# Patient Record
Sex: FEMALE | Race: WHITE | Employment: OTHER | URBAN - METROPOLITAN AREA
[De-identification: names, ages, dates, MRNs, and addresses within clinical notes are randomized per-mention and may not be internally consistent; named-entity substitution may affect disease eponyms.]

---

## 2017-12-27 PROBLEM — E11.9 TYPE 2 DIABETES MELLITUS WITHOUT COMPLICATION, WITHOUT LONG-TERM CURRENT USE OF INSULIN (HCC): Status: ACTIVE | Noted: 2017-12-27

## 2017-12-27 PROBLEM — E66.01 OBESITY, MORBID (HCC): Status: ACTIVE | Noted: 2017-12-27

## 2017-12-27 PROBLEM — G43.909 MIGRAINE WITHOUT STATUS MIGRAINOSUS, NOT INTRACTABLE: Status: ACTIVE | Noted: 2017-12-27

## 2017-12-27 PROBLEM — I10 ESSENTIAL HYPERTENSION: Status: ACTIVE | Noted: 2017-12-27

## 2017-12-27 PROBLEM — F41.9 ANXIETY: Status: ACTIVE | Noted: 2017-12-27

## 2017-12-27 PROBLEM — G25.81 RESTLESS LEGS SYNDROME: Status: ACTIVE | Noted: 2017-12-27

## 2017-12-27 PROBLEM — E03.9 ACQUIRED HYPOTHYROIDISM: Status: ACTIVE | Noted: 2017-12-27

## 2018-09-24 PROBLEM — E11.40 TYPE 2 DIABETES MELLITUS WITH DIABETIC NEUROPATHY (HCC): Status: ACTIVE | Noted: 2018-09-24

## 2018-10-11 PROBLEM — Z78.9 POOR TOLERANCE FOR AMBULATION: Status: ACTIVE | Noted: 2018-10-11

## 2018-12-12 PROBLEM — E66.01 MORBID OBESITY WITH BMI OF 70 AND OVER, ADULT (HCC): Status: ACTIVE | Noted: 2018-12-12

## 2019-09-24 ENCOUNTER — HOSPITAL ENCOUNTER (OUTPATIENT)
Dept: PHYSICAL THERAPY | Age: 59
Discharge: HOME | End: 2019-09-24
Attending: FAMILY MEDICINE
Payer: MEDICARE

## 2019-09-24 DIAGNOSIS — R29.898 WEAKNESS OF BOTH LEGS: ICD-10-CM

## 2019-09-24 PROCEDURE — 97165 OT EVAL LOW COMPLEX 30 MIN: CPT

## 2019-09-24 PROCEDURE — 97542 WHEELCHAIR MNGMENT TRAINING: CPT

## 2019-09-24 NOTE — THERAPY EVALUATION
Marcella Coy  : 1960  [place]  [place] [address] [facility] at [facility] [address], [address], [place], [address] [facility]  [phone]   [phone]           OCCUPATIONAL THERAPY WHEELCHAIR SEATING AND POSITIONING NOTE: Initial Assessment, Discharge and Treatment Day: 2019   ICD-10: Treatment Diagnosis:   Difficulty in walking, not elsewhere classified (R26.2)  History of falling (Z91.81)  Low back pain (M54.5)  Precautions/Allergies:   Codeine   MD Orders: OT evaluate and treat; Comments: Power wheelchair, patient is disabled, unable to ambulate easily, and is morbidly obese. MEDICAL/REFERRING DIAGNOSIS:   Weakness of both legs [R29.898]   DATE OF ONSET: Insidious over the last few years. REFERRING PHYSICIAN: Ryan Xie MD  RETURN PHYSICIAN APPOINTMENT: Pending  INTERDISCIPLINARY COLLABORATION: OT and ATP (with [employer]). INITIAL ASSESSMENT:  Ms. RIVAS presents with a history of morbid obesity, arthritis, asthma, diabetes, and chronic low back pain/hips. She reports increasing difficulty walking/repeated falls (about once a month) especially over the last 2 years and states she can no longer walk out to her car or stand for more than 2 minutes without severe pain in her back/legs. She has to scoot around her house in a computer chair to complete homemaking tasks. She would like a power wheelchair to be able to get around her home to complete her own cooking, cleaning, and other mobility related activities of daily living. Today she completed the timed up and go in 18 seconds utilizing a straight cane with contact guard assistance (The test measures, in seconds, the time taken by an individual to stand up from a standard arm chair (seat height 46 cm [18 in], arm height 65 cm [25.6 in]), walk a distance of 3 meters (118 in, approx 10 ft), turn, walk back to the chair and sit down.   If the individual takes longer than 14 seconds to complete TUG, this indicates risk for falls. ). Ms. Eulalio Cesar also completed the six minute walk test with ability to walk 120 feet in 75 seconds prior to requiring a sitting rest break (Normal range varies but is approximately 6160-1634 Feet) due to back/leg pain, decreased activity tolerance, and shortness of breath upon exertion with an oxygen saturation of 88% on room air following. No devices including canes, walkers, or rollators would improve her safety or ability to complete mobility related activities of daily living. Also, no manual wheelchair (i.e. Standard, lightweight, high strength lightweight, or ultra lightweight) would improve her independence with mobility related activities of daily living. Lastly, a power scooter would not improve her independence with mobility related activities of daily living due to difficulty transferring onto the platform and operating it in her home environment effectively. A heavy duty power wheelchair is recommended  for her to be able to effectively complete mobility related activities of daily living such as cooking and cleaning. PLAN OF CARE:   PROBLEM LIST:  1. Decreased Strength  2. Decreased ADL/Functional Activities  3. Decreased Balance  4. Increased Pain  5. Decreased Activity Tolerance  6. Decreased Pacing Skills  7. Increased Shortness of Breath  8. Decreased Flexibility/Joint Mobility  9. Edema/Girth  10. Difficulty Walking and Frequent falls INTERVENTIONS PLANNED:  1. Wheelchair management   TREATMENT PLAN:  Effective Dates: 9/24/2019 TO 9/24/2019. Frequency/Duration: Today's treatment/assessment. DISCHARGE GOALS: (Goals have been discussed and agreed upon with patient.): Time Frame: 1 visit  1.  Vamsi Song will trial a power wheelchair demonstrating good visual attention, visual-perception, command following, problem solving, reaction time, and activity tolerance as needed to operate a power wheelchair in an indoor and outdoor setting. Met. Rehabilitation Potential For Stated Goals: Goal met. EQUIPMENT RECOMMENDATIONS:   1. Heavy duty group 2 power wheelchair secondary to difficulty walking with any device significant and difficulty propelling any manual wheelchair to complete mobility related activities of daily living. She would not be able to utilize a power scooter difficulty transferring to chair/platform and need for increased support due to being overweight (365 lbs). She would benefit from a heavy duty group 2 power wheelchair to enable greater independence with mobility related activities of daily living. 2.  Retractable R joystick with hardware to enable safer functional transfers and to enable access to home environment such as the kitchen table. 3. Height adjustable swing away arm rests to enable safer functional transfers. 4. General use cushion. 5. General use back. 6. Batteries to power wheelchair  Regarding Ms. Carlton's therapy, I certify that the treatment plan above will be carried out by a therapist or under their direction. Thank you for this referral,  Krissy Craig OTR/L     Referring Physician Signature: Donny Rivera MD_________________________  Date _________            The information in this section was collected on 9/24/19 (except where otherwise noted). OCCUPATIONAL PROFILE & HISTORY:   History of Present Injury/Illness (Reason for Referral): Lord Ngo is a pleasant obese lady with a history of fibromyalgia, osteoarthritis, asthma (wear's a CPAP), sciatica and diabetes. Patient states she can't walk and hasn't been able to walk for a couple of years. States walking on unlevel ground is very difficult. States she bought a \"hurry cane\" and sates she fell with it. Typically falls about once a month. She states if she gets her foot hung on something she \"goes down\" and it is very difficult to stand up from the ground with her history of knee replacements. States she broke her great toe from a fall in 2009. Has to use a computer chair to mop the floor/do housework. States she has arthritis in her back and has significant pain. She states her R shoulder has significant pain in it. Had a nerve conduction test and was told it was coming from her neck. Patient states she uses a rolling walker because she gets better support from it. Typically uses it outside. States her friend loaned her a manual w/c that she has difficulty pushing. Goes over to her nephews about once a month. Patient states her doctor is trying to get a power w/c so she can get out and do things such as housework in the home (kitchen, den and living room), going grocery shopping, and get around outside in the yard. Past Medical History/Comorbidities:   Ms. Eulalio Cesar  has a past medical history of Acquired hypothyroidism (12/27/2017), Anxiety, Arthritis, Depression, Fibromyalgia, Hypertension (1977), Obesity, morbid ([address] Psychiatric disorder (2004), Psychiatric disorder (2004), Sleep apnea, Type 2 diabetes mellitus without complication, without long-term current use of insulin ([address] (12/27/2017), and Unspecified adverse effect of anesthesia. She also has no past medical history of Arrhythmia, Asthma, Autoimmune disease ([address] CAD (coronary artery disease), Cancer ([address] Chronic kidney disease, Chronic pain, Coagulation defects, COPD, Difficult intubation, GERD (gastroesophageal reflux disease), Heart failure ([address] Liver disease, Malignant hyperthermia due to anesthesia, Nausea & vomiting, Other ill-defined conditions(799.89), Pseudocholinesterase deficiency, PUD (peptic ulcer disease), Seizures ([address] or Thromboembolus ([address] Ms. Eulalio Cesar  has a past surgical history that includes pr uns oral surg proc by report (2000); hx appendectomy (1973); hx cholecystectomy (1990); hx gastric bypass (2002); hx orthopaedic (1975); hx orthopaedic (July 2009); hx dilation and curettage; and hx gyn.   Social History/Living Environment:     Lives with sister-in-law in a 2 story home (11 steps between the first and second floor - washer/dryer on the bottom floor). Enters into the house from second floor with 2 steps to enter. Drives a car ([employer]). Has hardwood gely except in her bedroom. DME: Rollator, rolling walker, and cane. 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 Became disabled after having knee replacement surgery. Caregiver: Sister-in-law and sister helps with washing clothes (difficulty getting to downstairs). Activity Level: States she is out of the bed about 5 hours a day sitting in the kitchen watching TV or doing housework. Typically sits in the bed. Has sleep problems. Dominant Side:         RIGHT  Previous Treatment Approaches:          Outpatient PT at [place].   Current Medications:    Current Outpatient Medications:     diclofenac (VOLTAREN) 1 % gel, Apply  to affected area four (4) times daily. , Disp: , Rfl:     clonazePAM (KLONOPIN) 1 mg tablet, TAKE 1 TABLET BY MOUTH TWICE A DAY, Disp: 60 Tab, Rfl: 2    budesonide-formoterol (SYMBICORT) 160-4.5 mcg/actuation HFAA, Take 2 Puffs by inhalation two (2) times a day. Recommended med, Disp: 1 Inhaler, Rfl: 5    omeprazole (PRILOSEC) 40 mg capsule, Take 1 Cap by mouth daily. , Disp: 90 Cap, Rfl: 1    rOPINIRole (REQUIP) 2 mg tablet, Take 1 Tab by mouth nightly., Disp: 90 Tab, Rfl: 1    MYRBETRIQ 50 mg ER tablet, Take 1 Tab by mouth daily. , Disp: 30 Tab, Rfl: 11    DULoxetine (CYMBALTA) 30 mg capsule, Take 1 Cap by mouth daily. Pt is to add 30mg dose to the 60mg dose =90 mg dose total (two scripts), Disp: 90 Cap, Rfl: 3    pioglitazone-metFORMIN (ACTOPLUS MET)  mg per tablet, Take 1 Tab by mouth two (2) times daily (with meals). , Disp: 180 Tab, Rfl: 1    DULoxetine (CYMBALTA) 60 mg capsule, Take 1 Cap by mouth daily. , Disp: 90 Cap, Rfl: 3   levothyroxine (SYNTHROID) 75 mcg tablet, Take 1 Tab by mouth Daily (before breakfast). Indications: hypothyroidism, Disp: 90 Tab, Rfl: 1    amLODIPine (NORVASC) 10 mg tablet, Take 1 Tab by mouth daily. , Disp: 90 Tab, Rfl: 1    hydroCHLOROthiazide (HYDRODIURIL) 25 mg tablet, Take 1 Tab by mouth daily. , Disp: 90 Tab, Rfl: 1    cloNIDine HCl (CATAPRES) 0.2 mg tablet, Take when systolic BP is > than 824. UP to 3 times in 24 hours, Disp: 30 Tab, Rfl: 3    tiZANidine (ZANAFLEX) 2 mg tablet, Take 1 Tab by mouth two (2) times a day., Disp: 180 Tab, Rfl: 1    meclizine (ANTIVERT) 25 mg tablet, Take 1 Tab by mouth three (3) times daily as needed (for dizziness). , Disp: 90 Tab, Rfl: 3    levothyroxine (SYNTHROID) 50 mcg tablet, Take 1 Tab by mouth Daily (before breakfast). , Disp: 90 Tab, Rfl: 1    miscellaneous medical supply misc, BP cuff, use for checking BP, pt has hypertension and should check at least once daily. , Disp: 1 Each, Rfl: 0    miscellaneous medical supply misc, Power Scooter (one) Poor ambulation. , Disp: 1 Each, Rfl: 0    sucralfate (CARAFATE) 100 mg/mL suspension, Take 10 mL by mouth four (4) times daily. , Disp: 1200 mL, Rfl: 2    miscellaneous medical supply misc, Handicap Placard - Use as directed for poor ambulation. , Disp: 1 Each, Rfl: 0    morphine CR (MS CONTIN) 15 mg CR tablet, , Disp: , Rfl:     oxyCODONE-acetaminophen (PERCOCET 10)  mg per tablet, , Disp: , Rfl:     fluticasone (FLONASE) 50 mcg/actuation nasal spray, 2 Sprays by Both Nostrils route daily. , Disp: 3 Bottle, Rfl: 3    raNITIdine (ZANTAC) 150 mg tablet, Take 1 Tab by mouth two (2) times a day., Disp: 180 Tab, Rfl: 5    budesonide-formoterol (SYMBICORT) 160-4.5 mcg/actuation HFAA, Take 2 Puffs by inhalation two (2) times a day., Disp: 1 Inhaler, Rfl: 5    naloxone (NARCAN) 4 mg/actuation nasal spray, Use 1 spray intranasally into 1 nostril.  Use a new Narcan nasal spray for subsequent doses and administer into alternating nostrils. May repeat every 2 to 3 minutes as needed. , Disp: 1 Each, Rfl: 0    tiZANidine (ZANAFLEX) 4 mg tablet, Take 4 mg by mouth three (3) times daily as needed. , Disp: , Rfl:     zolpidem (AMBIEN) 5 mg tablet, Take  by mouth nightly as needed for Sleep., Disp: , Rfl:     rizatriptan (MAXALT) 10 mg tablet, Take 10 mg by mouth once as needed for Migraine. May repeat in 2 hours if needed, Disp: , Rfl:     magnesium oxide 500 mg tab, Take  by mouth., Disp: , Rfl:     calcium-cholecalciferol, d3, (CALCIUM 600 + D) 600-125 mg-unit tab, Take  by mouth., Disp: , Rfl:     lisinopril (PRINIVIL, ZESTRIL) 10 mg tablet, Take 1 Tab by mouth daily. , Disp: 90 Tab, Rfl: 1    betamethasone dipropionate (DIPROSONE) 0.05 % cream, Apply  to affected area two (2) times a day. Apply to right lower leg , Disp: , Rfl:     gabapentin (NEURONTIN) 600 mg tablet, take 2 Tabs by mouth nightly. Medication not on pharmacy formulary - Patient to bring day of surgery. , Disp: , Rfl:     MULTIVITAMINS (MULTIVITAMIN PO), take  by mouth every evening., Disp: , Rfl:             Date Last Reviewed: 9/24/2019    Complexity Level : Brief history (0):  LOW COMPLEXITY   ASSESSMENT OF OCCUPATIONAL PERFORMANCE:   HEIGHT: 5'2\" Weight: 386 lbs (states she was at 389 lbs last year when she lost her ) - Weight addended per most recent MD note (on 9/6/19) and verified by patient. No other sections of this note have been addended. GROSS PRESENTATION/POSTURE:   Seated: Forward head/rounded shoulders. Standing: Wide base of support. MENTAL STATUS:   Neurologic State: Alert   Orientation Level: Oriented to person, place, and situation. Cognition: Follows 2-3 step commands. Perception: Appears grossly intact. Safety/Judgement: Intact. Understands/expresses information appropriately   Memory: Immediate Short Term Memory: Grossly Intact. Long term: Grossly Intact.   VISION: Identifies obstacles in visual field and appropriately navigates around objects in wheelchair. Corrective Lenses: Bifocals. BOWEL/BLADDER: States she has occasional bladder accidents. ADLs from General Assessment:       [address] [place] [address] AM-PACTM \"6 Clicks\"           Daily Activity Inpatient Short Form  How much help from another person does the patient currently need. .. Total A Lot A Little None   1. Putting on and taking off regular lower body clothing? [] 1   [] 2   [x] 3   [] 4   2. Bathing (including washing, rinsing, drying)? [] 1   [] 2   [x] 3   [] 4   3. Toileting, which includes using toilet, bedpan or urinal?   [] 1   [] 2   [x] 3   [] 4   4. Putting on and taking off regular upper body clothing? [] 1   [] 2   [x] 3   [] 4   5. Taking care of personal grooming such as brushing teeth? [] 1   [] 2   [] 3   [x] 4   6. Eating meals? [] 1   [] 2   [] 3   [x] 4   © 2007, Trustees of [address] [place] [address], under license to [employer]. All rights reserved     Score:  Initial: 20 Most Recent: X (Date: -- )   Interpretation of Tool:  Represents clinically-significant functional categories (i.e.Activities of daily living). MAT EVALUATION:   Measurements in sitting (in collaboration with  - see  note for details). Hip width: 25\"  Top of head: 30\"  Top of shoulder: 20\"  Seat to Elbow: 9\"  Upper leg length: 16\"  Lower leg length: 22\"  Sitting Posture:  Pelvis No overt deformities. Spine No overt deformities. Upper Extremities   Shoulder symmetry: Shoulders protracted/forward head. Lower Extremities No overt deformities. Head/neck: No overt deformities. SKIN INTEGRITY: Denies any history of wounds. Small wound on dorsum of R index finger (states she got cut and didn't realize it). EDEMA/GIRTH:  No pitting edema noted. SENSATION: Reports tingling in her feet and numbness in her R hand mainly (just starting in the L hand)   STRENGTH:   LUE Strength/ROM (General):    Shoulder Elevation: Yes  Shoulder IR/ER: Able to reach lumbar spine/back of head. Elbow Flex/Extension: WFL  : 4 RUE Strength/ROM (General): Shoulder Elevation: WFL AROM, but not against resistance. Shoulder IR/ER: Able to reach lumbar spine with difficulty. Elbow Flex/Extension:  WFL  : 4   LLE Strength/ROM (General): Hip Flexion against gravity:  Yes  Hip Abd/Adduction against resistance: Yes  Knee Flex/Extension against gravity: Yes  Ankle Dorsi/Plantar Flexion against gravity: Yes RLE Strength/ROM (General): Hip Flexion against gravity:  Yes  Hip Abd/Adduction against resistance: Yes  Knee Flex/Extension against gravity: Yes  Ankle Dorsi/Plantar Flexion against gravity: Yes   BALANCE:   Seated (Static): Good. Seated (Dynamic): Good. Standing (Static): Constant support. Standing (Dynamic): Requires constant support. FUNCTIONAL MOBILITY:   Tool Used: Timed Up and Go (TUG)  Score:  Initial: 18 seconds with straight cane. Most Recent: X seconds (Date: -- )   Tool Used: 6-MINUTE WALK TEST  Score:  Initial: 120 feet in 1 minute 15 seconds Most Recent: X feet (Date: -- )   Interpretation of Score: Normal range varies but is approximately 4920-4432 Feet      Distance walked: 120 feet               Baseline End of Test   Heart Rate 76 100   SpO2 92 88   Tool Used: Lower Extremity Functional Scale (LEFS)  Score:  Initial: 27/80 Most Recent: X/80 (Date: -- )   Interpretation of Score: 20 questions each scored on a 5 point scale with 0 representing \"extreme difficulty or unable to perform\" and 4 representing \"no difficulty\". The lower the score, the greater the functional disability. 80/80 represents no disability. Minimal detectable change is 9 points. Physical Skills Involved:  1. Range of Motion  2. Balance  3. Strength  4. Activity Tolerance  5. Sensation  6. Gross Motor Control  7. Pain (Chronic) Cognitive Skills Affected (resulting in the inability to perform in a timely and safe manner):  1.  None noted Psychosocial Skills Affected:  1. Habits/Routines  2. Environmental Adaptation  3. Social Interaction  4. Social Roles   Number of elements that affect the Plan of Care: 1-3:  LOW COMPLEXITY   CLINICAL DECISION MAKING:     Medical Necessity:   · Today's assessment/treatment only. Reason for Services/Other Comments:  · N/A. Assessment Modification and Need for Assistance:  1. No modification or assistance (Low)   Assessment process, impact of co-morbidities, assessment modification\need for assistance, and selection of interventions: Analytical Complexity:LOW COMPLEXITY   TREATMENT:   (In addition to Assessment/Re-Assessment sessions the following treatments were rendered)    Pre-treatment Symptoms/Complaints:    Pain: Initial:     5/10 RightBack, Shoulder  Post Session:    same/10   Assessment: 70 minutes  In addition to assessment the below treatment deemed medically necessary:  Wheelchair Management and Training: ( 8 minutes): Procedure(s) utilized to improve and/or restore functioning as related to power wheelchair mobility. Required minimal visual and verbal cueing to facilitate ability to navigate trial power and/or manual wheelchair through multiple environments as would be expected in a home or community environment.    Equipment Trial (use \".outdoor\" to document outdoor training):   INDOOR TRAINING:  [x] YES [] NO Cause and effect concepts while in the wheelchair (i.e. Activating a switch causes the wheelchair to move)   [x] YES [] NO Stop and go concepts while in the wheelchair (i.e. \"stop and go\" verbal instructions, or consistently stopping for objects)   [x] YES [] NO Directional concepts while in the wheelchair (i.e. moving the joystick in different directions to move the wheelchair in different directions)   [x] YES [] NO Ability to follow directions: (i.e. Following varying verbal commands in a safe and timely manner)   [x] YES [] NO Adequate visual functioning to safely drive indoors (i.e. Visual attention to environment and ability to avoid obstacles)   [x] YES [] NO Adequate problem solving ability (i.e. Maneuver power wheelchair to designated destination without verbal cues)   [x] YES [] NO Ability to use access method with adequate activation, sustained contact and release (i.e. Able to access switch, control contact, and release when ready to stop wheelchair)   [x] YES [] NO Small space maneuverability (room)   [x] YES [] NO Large space maneuverability (hallway)   [x] YES [] NO Accessing Doorways   COMMENTS:      Treatment/Session Assessment:  See assessment section at top of page. · Response to Treatment:  Tolerated well without complications. · Compliance with Program/Exercises: compliant today. .  · Recommendations/Intent for next treatment session: \"Next visit will focus on N/A\".   Total Treatment Duration:  OT Patient Time In/Time Out  Time In: 1440  Time Out: 2 SYLVIA Eubanks, OTR/L

## 2019-11-25 PROBLEM — R06.02 SHORTNESS OF BREATH ON EXERTION: Status: ACTIVE | Noted: 2019-11-25

## 2020-02-27 PROBLEM — E11.21 TYPE 2 DIABETES WITH NEPHROPATHY (HCC): Status: ACTIVE | Noted: 2020-02-27

## 2020-02-27 PROBLEM — F33.0 DEPRESSION, MAJOR, RECURRENT, MILD (HCC): Status: ACTIVE | Noted: 2020-02-27

## 2020-06-27 ENCOUNTER — HOSPITAL ENCOUNTER (INPATIENT)
Age: 60
LOS: 6 days | Discharge: HOME | DRG: 394 | End: 2020-07-03
Attending: INTERNAL MEDICINE | Admitting: INTERNAL MEDICINE
Payer: MEDICARE

## 2020-06-27 PROBLEM — K56.609 SMALL BOWEL OBSTRUCTION (HCC): Status: ACTIVE | Noted: 2020-06-27

## 2020-06-27 PROBLEM — G47.33 OSA (OBSTRUCTIVE SLEEP APNEA): Status: ACTIVE | Noted: 2020-06-27

## 2020-06-27 PROBLEM — M54.50 CHRONIC BILATERAL LOW BACK PAIN: Status: ACTIVE | Noted: 2020-06-27

## 2020-06-27 PROBLEM — G89.29 CHRONIC BILATERAL LOW BACK PAIN: Status: ACTIVE | Noted: 2020-06-27

## 2020-06-27 PROCEDURE — 65270000029 HC RM PRIVATE

## 2020-06-27 PROCEDURE — 0D9670Z DRAINAGE OF STOMACH WITH DRAINAGE DEVICE, VIA NATURAL OR ARTIFICIAL OPENING: ICD-10-PCS | Performed by: INTERNAL MEDICINE

## 2020-06-27 RX ORDER — ONDANSETRON 2 MG/ML
4 INJECTION INTRAMUSCULAR; INTRAVENOUS
Status: DISCONTINUED | OUTPATIENT
Start: 2020-06-27 | End: 2020-07-03 | Stop reason: HOSPADM

## 2020-06-27 RX ORDER — BUDESONIDE AND FORMOTEROL FUMARATE DIHYDRATE 160; 4.5 UG/1; UG/1
2 AEROSOL RESPIRATORY (INHALATION) 2 TIMES DAILY
Status: DISCONTINUED | OUTPATIENT
Start: 2020-06-28 | End: 2020-06-28

## 2020-06-27 RX ORDER — HEPARIN SODIUM 5000 [USP'U]/ML
5000 INJECTION, SOLUTION INTRAVENOUS; SUBCUTANEOUS EVERY 8 HOURS
Status: DISCONTINUED | OUTPATIENT
Start: 2020-06-28 | End: 2020-07-03 | Stop reason: HOSPADM

## 2020-06-27 RX ORDER — AMLODIPINE BESYLATE 10 MG/1
10 TABLET ORAL DAILY
COMMUNITY
End: 2020-08-31

## 2020-06-27 RX ORDER — MORPHINE SULFATE 10 MG/ML
5 INJECTION, SOLUTION INTRAMUSCULAR; INTRAVENOUS
Status: DISCONTINUED | OUTPATIENT
Start: 2020-06-27 | End: 2020-07-03 | Stop reason: HOSPADM

## 2020-06-27 RX ORDER — NALOXONE HYDROCHLORIDE 0.4 MG/ML
0.4 INJECTION, SOLUTION INTRAMUSCULAR; INTRAVENOUS; SUBCUTANEOUS AS NEEDED
Status: DISCONTINUED | OUTPATIENT
Start: 2020-06-27 | End: 2020-07-03 | Stop reason: HOSPADM

## 2020-06-27 RX ORDER — HYDRALAZINE HYDROCHLORIDE 20 MG/ML
20 INJECTION INTRAMUSCULAR; INTRAVENOUS
Status: DISCONTINUED | OUTPATIENT
Start: 2020-06-27 | End: 2020-07-03 | Stop reason: HOSPADM

## 2020-06-27 RX ORDER — FLUTICASONE PROPIONATE 50 MCG
2 SPRAY, SUSPENSION (ML) NASAL DAILY
Status: DISCONTINUED | OUTPATIENT
Start: 2020-06-28 | End: 2020-07-03 | Stop reason: HOSPADM

## 2020-06-27 RX ORDER — ADHESIVE BANDAGE
30 BANDAGE TOPICAL DAILY PRN
Status: DISCONTINUED | OUTPATIENT
Start: 2020-06-27 | End: 2020-07-03 | Stop reason: HOSPADM

## 2020-06-27 RX ORDER — SODIUM CHLORIDE 0.9 % (FLUSH) 0.9 %
5-40 SYRINGE (ML) INJECTION EVERY 8 HOURS
Status: DISCONTINUED | OUTPATIENT
Start: 2020-06-27 | End: 2020-07-03 | Stop reason: HOSPADM

## 2020-06-27 RX ORDER — SODIUM CHLORIDE 0.9 % (FLUSH) 0.9 %
5-40 SYRINGE (ML) INJECTION AS NEEDED
Status: DISCONTINUED | OUTPATIENT
Start: 2020-06-27 | End: 2020-07-03 | Stop reason: HOSPADM

## 2020-06-28 ENCOUNTER — APPOINTMENT (OUTPATIENT)
Dept: GENERAL RADIOLOGY | Age: 60
DRG: 394 | End: 2020-06-28
Attending: INTERNAL MEDICINE
Payer: MEDICARE

## 2020-06-28 PROBLEM — E87.6 HYPOKALEMIA: Status: ACTIVE | Noted: 2020-06-28

## 2020-06-28 LAB
ALBUMIN SERPL-MCNC: 3.2 G/DL (ref 3.2–4.6)
ALBUMIN/GLOB SERPL: 0.9 {RATIO} (ref 1.2–3.5)
ALP SERPL-CCNC: 119 U/L (ref 50–136)
ALT SERPL-CCNC: 24 U/L (ref 12–65)
ANION GAP SERPL CALC-SCNC: 8 MMOL/L (ref 7–16)
AST SERPL-CCNC: 17 U/L (ref 15–37)
BILIRUB SERPL-MCNC: 0.5 MG/DL (ref 0.2–1.1)
BUN SERPL-MCNC: 9 MG/DL (ref 8–23)
CALCIUM SERPL-MCNC: 8.7 MG/DL (ref 8.3–10.4)
CHLORIDE SERPL-SCNC: 98 MMOL/L (ref 98–107)
CO2 SERPL-SCNC: 31 MMOL/L (ref 21–32)
CREAT SERPL-MCNC: 0.73 MG/DL (ref 0.6–1)
ERYTHROCYTE [DISTWIDTH] IN BLOOD BY AUTOMATED COUNT: 12.8 % (ref 11.9–14.6)
GLOBULIN SER CALC-MCNC: 3.5 G/DL (ref 2.3–3.5)
GLUCOSE SERPL-MCNC: 103 MG/DL (ref 65–100)
HCT VFR BLD AUTO: 37.7 % (ref 35.8–46.3)
HGB BLD-MCNC: 12.6 G/DL (ref 11.7–15.4)
MCH RBC QN AUTO: 31.8 PG (ref 26.1–32.9)
MCHC RBC AUTO-ENTMCNC: 33.4 G/DL (ref 31.4–35)
MCV RBC AUTO: 95.2 FL (ref 79.6–97.8)
NRBC # BLD: 0 K/UL (ref 0–0.2)
PLATELET # BLD AUTO: 300 K/UL (ref 150–450)
PMV BLD AUTO: 10.4 FL (ref 9.4–12.3)
POTASSIUM SERPL-SCNC: 3.2 MMOL/L (ref 3.5–5.1)
PROT SERPL-MCNC: 6.7 G/DL (ref 6.3–8.2)
RBC # BLD AUTO: 3.96 M/UL (ref 4.05–5.2)
SODIUM SERPL-SCNC: 137 MMOL/L (ref 136–145)
WBC # BLD AUTO: 7.3 K/UL (ref 4.3–11.1)

## 2020-06-28 PROCEDURE — 94760 N-INVAS EAR/PLS OXIMETRY 1: CPT

## 2020-06-28 PROCEDURE — 77010033678 HC OXYGEN DAILY

## 2020-06-28 PROCEDURE — 65270000029 HC RM PRIVATE

## 2020-06-28 PROCEDURE — 94640 AIRWAY INHALATION TREATMENT: CPT

## 2020-06-28 PROCEDURE — 74011250637 HC RX REV CODE- 250/637: Performed by: INTERNAL MEDICINE

## 2020-06-28 PROCEDURE — 77030008771 HC TU NG SALEM SUMP -A

## 2020-06-28 PROCEDURE — 74011250636 HC RX REV CODE- 250/636: Performed by: INTERNAL MEDICINE

## 2020-06-28 PROCEDURE — 77030012341 HC CHMB SPCR OPTC MDI VYRM -A

## 2020-06-28 PROCEDURE — 74018 RADEX ABDOMEN 1 VIEW: CPT

## 2020-06-28 PROCEDURE — 36415 COLL VENOUS BLD VENIPUNCTURE: CPT

## 2020-06-28 PROCEDURE — 80053 COMPREHEN METABOLIC PANEL: CPT

## 2020-06-28 PROCEDURE — 85027 COMPLETE CBC AUTOMATED: CPT

## 2020-06-28 PROCEDURE — 77030038269 HC DRN EXT URIN PURWCK BARD -A

## 2020-06-28 RX ORDER — POTASSIUM CHLORIDE 14.9 MG/ML
20 INJECTION INTRAVENOUS
Status: COMPLETED | OUTPATIENT
Start: 2020-06-28 | End: 2020-06-28

## 2020-06-28 RX ORDER — BUDESONIDE AND FORMOTEROL FUMARATE DIHYDRATE 160; 4.5 UG/1; UG/1
2 AEROSOL RESPIRATORY (INHALATION)
Status: DISCONTINUED | OUTPATIENT
Start: 2020-06-28 | End: 2020-07-03 | Stop reason: HOSPADM

## 2020-06-28 RX ADMIN — HEPARIN SODIUM 5000 UNITS: 5000 INJECTION INTRAVENOUS; SUBCUTANEOUS at 05:26

## 2020-06-28 RX ADMIN — POTASSIUM CHLORIDE 20 MEQ: 200 INJECTION, SOLUTION INTRAVENOUS at 13:51

## 2020-06-28 RX ADMIN — HEPARIN SODIUM 5000 UNITS: 5000 INJECTION INTRAVENOUS; SUBCUTANEOUS at 21:14

## 2020-06-28 RX ADMIN — Medication 10 ML: at 21:15

## 2020-06-28 RX ADMIN — Medication 10 ML: at 00:26

## 2020-06-28 RX ADMIN — Medication 10 ML: at 13:51

## 2020-06-28 RX ADMIN — MORPHINE SULFATE 5 MG: 10 INJECTION, SOLUTION INTRAMUSCULAR; INTRAVENOUS at 08:19

## 2020-06-28 RX ADMIN — BUDESONIDE AND FORMOTEROL FUMARATE DIHYDRATE 2 PUFF: 160; 4.5 AEROSOL RESPIRATORY (INHALATION) at 21:24

## 2020-06-28 RX ADMIN — Medication 10 ML: at 05:26

## 2020-06-28 RX ADMIN — ONDANSETRON 4 MG: 2 INJECTION INTRAMUSCULAR; INTRAVENOUS at 01:18

## 2020-06-28 RX ADMIN — MORPHINE SULFATE 5 MG: 10 INJECTION, SOLUTION INTRAMUSCULAR; INTRAVENOUS at 14:12

## 2020-06-28 RX ADMIN — MORPHINE SULFATE 5 MG: 10 INJECTION, SOLUTION INTRAMUSCULAR; INTRAVENOUS at 21:16

## 2020-06-28 RX ADMIN — HEPARIN SODIUM 5000 UNITS: 5000 INJECTION INTRAVENOUS; SUBCUTANEOUS at 13:51

## 2020-06-28 RX ADMIN — MORPHINE SULFATE 5 MG: 10 INJECTION, SOLUTION INTRAMUSCULAR; INTRAVENOUS at 01:22

## 2020-06-28 RX ADMIN — POTASSIUM CHLORIDE 20 MEQ: 200 INJECTION, SOLUTION INTRAVENOUS at 15:17

## 2020-06-28 RX ADMIN — BUDESONIDE AND FORMOTEROL FUMARATE DIHYDRATE 2 PUFF: 160; 4.5 AEROSOL RESPIRATORY (INHALATION) at 09:03

## 2020-06-28 NOTE — PROGRESS NOTES
TRANSFER - IN REPORT:    Verbal report received from MATEO Red(name) on Gorge Oyster  being received from [facility](unit) for routine progression of care      Report consisted of patients Situation, Background, Assessment and   Recommendations(SBAR). Information from the following report(s) SBAR was reviewed with the receiving nurse. Opportunity for questions and clarification was provided. Assessment completed upon patients arrival to unit and care assumed.

## 2020-06-28 NOTE — CONSULTS
H&P/Consult Note/Progress Note/Office Note:   Grace Alarcon  MRN: [id number]  :1960  Age:59 y.o.    HPI: Grace Alarcon is a 62 y.o. female who is seen for SBO. She developed nausea/vomiting with abdominal pain symptoms 12 days ago, she has been in and out of [facility], was just discharged two days ago, but her symptoms returned today with nausea, vomiting. Her last BM and flatus was about 2 days ago. Her outside CT reports SBO with possible incarcerated hernia. She is extremely obese with BMI 63. She had numerous abdominal surgeries including open gastric bypass, open rashid. Her other medical issues are reviewed as below. Past Medical History:   Diagnosis Date    Acquired hypothyroidism 2017    Anxiety     Arthritis     Depression      & ANXIETY    Fibromyalgia     Hypertension     Obesity, morbid ([address]     Psychiatric disorder     Anxiety disorder    Psychiatric disorder     depression    Sleep apnea     uses c-pap, non compliant    Type 2 diabetes mellitus without complication, without long-term current use of insulin ([address] 2017    Unspecified adverse effect of anesthesia     after gastric bypass, woke up on vent     Past Surgical History:   Procedure Laterality Date    HX APPENDECTOMY  1973    HX CHOLECYSTECTOMY      HX DILATION AND CURETTAGE      X 2    HX GASTRIC BYPASS      Dr. Elayne Duff    rt knee has screw in knee tendons streched    HX ORTHOPAEDIC  2009    Right knee replacement    GA UNS ORAL SURG PROC BY REPORT       No current facility-administered medications for this encounter.       Codeine and Sulfa (sulfonamide antibiotics)  Social History     Socioeconomic History    Marital status:      Spouse name: Not on file    Number of children: Not on file    Years of education: Not on file    Highest education level: Not on file   Tobacco Use    Smoking status: Never Smoker    Smokeless tobacco: Never Used   Substance and Sexual Activity    Alcohol use: No    Drug use: No     Social History     Tobacco Use   Smoking Status Never Smoker   Smokeless Tobacco Never Used     Family History   Problem Relation Age of Onset    Post-op Nausea/Vomiting Mother    [facility] COPD Mother     Heart Disease Mother     Heart Attack Mother    [facility] Arthritis-rheumatoid Mother     Diabetes Mother     Hypertension Mother     Heart Disease Father     Cancer Father     Heart Attack Father     Hypertension Father     Elevated Lipids Father     Hypertension Sister     Heart Attack Brother     Diabetes Maternal Grandfather     Ovarian Cancer Paternal Grandmother      ROS: The patient has no difficulty with chest pain or shortness of breath. No fever or chills. Comprehensive review of systems was otherwise unremarkable except as noted above. Physical Exam:   Visit Vitals  BP (!) 168/92 (BP 1 Location: Left arm, BP Patient Position: At rest) Comment: rn NOTIFIED   Pulse 70   Temp 98.1 °F (36.7 °C)   Resp 18   SpO2 93%     Vitals:    06/27/20 2100   BP: (!) 168/92   Pulse: 70   Resp: 18   Temp: 98.1 °F (36.7 °C)   SpO2: 93%     No intake/output data recorded. No intake/output data recorded. Constitutional: Alert, oriented, cooperative patient in no acute distress; appears stated age    Eyes:Sclera are clear. EOMs intact  ENMT: no external lesions gross hearing normal; no obvious neck masses, no ear or lip lesions, nares normal  CV: RRR. Normal perfusion  Resp: No JVD. Breathing is  non-labored; no audible wheezing. GI: soft, obese. A large left abdominal wall hernia, which is softer per her. Mild tenderness at low abdomen. No peritoneal signs. Musculoskeletal: unremarkable with normal function. No embolic signs or cyanosis.    Neuro:  Oriented; moves all 4; no focal deficits  Psychiatric: normal affect and mood, no memory impairment    Recent vitals (if inpt):  Patient Vitals for the past 24 hrs:   BP Temp Pulse Resp SpO2   06/27/20 2100 (!) 168/92 98.1 °F (36.7 °C) 70 18 93 %       Labs:  No results for input(s): WBC, HGB, PLT, NA, K, CL, CO2, BUN, CREA, GLU, PTP, INR, APTT, TBIL, TBILI, CBIL, ALT, AP, AML, AML, LPSE, LCAD, NH4, TROPT, TROIQ, PCO2, PO2, HCO3, HGBEXT, PLTEXT, INREXT in the last 72 hours. No lab exists for component: SGOT, GPT,  PH    Lab Results   Component Value Date/Time    WBC 12.6 (H) 02/19/2020 08:27 AM    HGB 14.8 02/19/2020 08:27 AM    PLATELET 757 08/82/6359 08:27 AM    Sodium 140 02/19/2020 08:27 AM    Potassium 3.6 02/19/2020 08:27 AM    Chloride 94 (L) 02/19/2020 08:27 AM    CO2 28 02/19/2020 08:27 AM    BUN 12 02/19/2020 08:27 AM    Creatinine 0.82 02/19/2020 08:27 AM    Glucose 144 (H) 02/19/2020 08:27 AM    INR 1.2 10/29/2009 05:05 AM    aPTT 26.4 10/21/2009 10:00 AM    Bilirubin, total 0.2 02/19/2020 08:27 AM    ALT (SGPT) 26 02/19/2020 08:27 AM    Alk. phosphatase 103 02/19/2020 08:27 AM       CT Results  (Last 48 hours)    None        chest X-ray      I reviewed recent labs, recent radiologic studies, and pertinent records including other doctor notes if needed. I independently reviewed radiology images for studies I described above or studies I have ordered.    Admission date (for inpatients): 6/27/2020   * No surgery found *  * No surgery found *    ASSESSMENT/PLAN:  Problem List  Date Reviewed: 2/27/2020          Codes Class Noted    Type 2 diabetes with nephropathy [facility]) ICD-10-CM: E11.21  ICD-9-CM: 250.40, 583.81  2/27/2020        Depression, major, recurrent, mild ([facility] ICD-10-CM: F33.0  ICD-9-CM: 296.31  2/27/2020        Shortness of breath on exertion ICD-10-CM: R06.02  ICD-9-CM: 786.05  11/25/2019        Morbid obesity with BMI of 70 and over, adult [facility]) ICD-10-CM: E66.01, Z68.45  ICD-9-CM: 278.01, V85.45  12/12/2018        Poor tolerance for ambulation ICD-10-CM: Z78.9  ICD-9-CM: V49.89  10/11/2018        Type 2 diabetes mellitus with diabetic neuropathy ([facility] ICD-10-CM: E11.40  ICD-9-CM: 250.60, 357.2  9/24/2018        Obesity, morbid ([facility] ICD-10-CM: E66.01  ICD-9-CM: 278.01  12/27/2017        Restless legs syndrome ICD-10-CM: G25.81  ICD-9-CM: 333.94  12/27/2017        Anxiety ICD-10-CM: F41.9  ICD-9-CM: 300.00  12/27/2017        Essential hypertension ICD-10-CM: I10  ICD-9-CM: 401.9  12/27/2017        Migraine without status migrainosus, not intractable ICD-10-CM: M48.561  ICD-9-CM: 346.90  12/27/2017        Type 2 diabetes mellitus without complication, without long-term current use of insulin ([facility] ICD-10-CM: E11.9  ICD-9-CM: 250.00  12/27/2017        Acquired hypothyroidism ICD-10-CM: E03.9  ICD-9-CM: 244.9  12/27/2017        Osteoarthritis of left knee ICD-10-CM: M17.12  ICD-9-CM: 715.96  10/26/2009        S/P total knee replacement using cement ICD-10-CM: Z96.659  ICD-9-CM: V43.65  10/26/2009        Acute blood loss anemia ICD-10-CM: D62  ICD-9-CM: 285.1  7/28/2009        Osteoarthritis of right knee ICD-10-CM: M17.11  ICD-9-CM: 715.96  7/27/2009        S/P total knee replacement using cement ICD-10-CM: Z96.659  ICD-9-CM: V43.65  7/27/2009            Active Problems:    * No active hospital problems. *     Partial SBO, probably from incarcerated hernia. Continue conservative management. However, given her timeframe and failed previous attempt, she might need surgical intervention, which would be very high risks due to he extreme body habitus. Will follow. I have personally performed a face-to-face diagnostic evaluation and management  service on this patient. I have independently seen the patient. I have independently obtained the above history from the patient/family. I have independently examined the patient with above findings. I have independently reviewed data/labs for this patient and developed the above plan of care (MDM).   Signed: Cece Garcia MD, FACS

## 2020-06-28 NOTE — PROGRESS NOTES
Name: Otf Lopez MRN: [id number]  : 1960  Age:59 y.o.  female  Admit Date:  2020 LOS: 1      Hospitalist Progress Note    Otf Lopez is a 62 y.o. female with medical history significant for morbid obesity, hypertension, anxiety, sleep apnea, hypothyroidism presents as a direct transfer from urgent care center after being found with a small bowel obstruction. Patient reports symptoms for started 12 days ago. Imaging done at Urgent care on  showed small bowel obstruction. Patient was admitted to another facility overnight for management of small bowel obstruction. She did not have NG tube placed at that time and she had a large bowel movement in the morning and was able to tolerate liquid diet. She was discharged following day and went home. However, after discharge she noted that her stomach was becoming more uncomfortable. Repeat imaging at Urgent care on  showed showed a small bowel obstruction and she was then transferred to [facility]. Surgery was consulted and recommended conservative management. Subjective (20) :  Patient is seen and examined at bedside. No acute events reported overnight by nursing staff. Having some flatus but no BM. Reports improvement in abdominal discomfort. No longer having any nausea. Patient denies fever, chills, chest pains, shortness of breath. ROS: 10 point review of systems is otherwise negative with the exception of the elements mentioned above.     Objective:    Patient Vitals for the past 24 hrs:   Temp Pulse Resp BP SpO2   20 1133 97.9 °F (36.6 °C) 70 18 144/78 95 %   20 0904     92 %   20 0903     (!) 82 %   20 0825 98 °F (36.7 °C) 65 18 167/77 98 %   20 0516 98.1 °F (36.7 °C) 80 18 145/81 90 %   20 2350 98.1 °F (36.7 °C) 69 18 177/80 96 %   20 2100 98.1 °F (36.7 °C) 70 18 (!) 168/92 93 %     Oxygen Therapy  O2 Sat (%): 95 % (20 1133)  Pulse via Oximetry: 82 beats per minute (06/28/20 0904)  O2 Device: Nasal cannula (06/28/20 0904)  O2 Flow Rate (L/min): 2 l/min (06/28/20 0904)    Estimated body mass index is 9,823.31 kg/m² as calculated from the following:    Height as of this encounter: 5.2\" (0.132 m). Weight as of this encounter: 171.4 kg (377 lb 12.8 oz). Intake/Output Summary (Last 24 hours) at 6/28/2020 1243  Last data filed at 6/27/2020 2100  Gross per 24 hour   Intake    Output 550 ml   Net -550 ml       *Note that automatically entered I/Os may not be accurate; dependent on patient compliance with collection and accurate  by techs. Physical Exam:   General:     alert, awake, no acute distress. Well nourished. Obese  Head:   normocephalic, atraumatic  Eyes, Ears, nose: PERRL, EOMI. Normal conjunctiva  Neck:    supple, non-tender. Trachea midline. Lungs:   CTAB, no wheezing, rhonchi, rales  Cardiac:   RRR, Normal S1 and S2. Abdomen:   Soft, non distended, nontender, +BS  Extremities:   Warm, dry. No edema   Skin:   No rashes, no jaundice  Neuro:  AAOx3. No gross focal neurological deficit  Psychiatric:  No anxiety, calm, cooperative    Data Review:  I have reviewed all labs, meds, and studies from the last 24 hours:    Recent Results (from the past 24 hour(s))   METABOLIC PANEL, COMPREHENSIVE    Collection Time: 06/28/20  5:35 AM   Result Value Ref Range    Sodium 137 136 - 145 mmol/L    Potassium 3.2 (L) 3.5 - 5.1 mmol/L    Chloride 98 98 - 107 mmol/L    CO2 31 21 - 32 mmol/L    Anion gap 8 7 - 16 mmol/L    Glucose 103 (H) 65 - 100 mg/dL    BUN 9 8 - 23 MG/DL    Creatinine 0.73 0.6 - 1.0 MG/DL    GFR est AA >60 >60 ml/min/1.73m2    GFR est non-AA >60 >60 ml/min/1.73m2    Calcium 8.7 8.3 - 10.4 MG/DL    Bilirubin, total 0.5 0.2 - 1.1 MG/DL    ALT (SGPT) 24 12 - 65 U/L    AST (SGOT) 17 15 - 37 U/L    Alk.  phosphatase 119 50 - 136 U/L    Protein, total 6.7 6.3 - 8.2 g/dL    Albumin 3.2 3.2 - 4.6 g/dL    Globulin 3.5 2.3 - 3.5 g/dL    A-G Ratio 0.9 (L) 1.2 - 3.5     CBC W/O DIFF    Collection Time: 06/28/20  5:35 AM   Result Value Ref Range    WBC 7.3 4.3 - 11.1 K/uL    RBC 3.96 (L) 4.05 - 5.2 M/uL    HGB 12.6 11.7 - 15.4 g/dL    HCT 37.7 35.8 - 46.3 %    MCV 95.2 79.6 - 97.8 FL    MCH 31.8 26.1 - 32.9 PG    MCHC 33.4 31.4 - 35.0 g/dL    RDW 12.8 11.9 - 14.6 %    PLATELET 499 145 - 835 K/uL    MPV 10.4 9.4 - 12.3 FL    ABSOLUTE NRBC 0.00 0.0 - 0.2 K/uL        All Micro Results     None          Current Meds:  Current Facility-Administered Medications   Medication Dose Route Frequency    budesonide-formoteroL (SYMBICORT) 160-4.5 mcg/actuation HFA inhaler 2 Puff  2 Puff Inhalation BID RT    potassium chloride 20 mEq in 100 ml IVPB  20 mEq IntraVENous Q2H    fluticasone propionate (FLONASE) 50 mcg/actuation nasal spray 2 Spray  2 Spray Both Nostrils DAILY    sodium chloride (NS) flush 5-40 mL  5-40 mL IntraVENous Q8H    sodium chloride (NS) flush 5-40 mL  5-40 mL IntraVENous PRN    naloxone (NARCAN) injection 0.4 mg  0.4 mg IntraVENous PRN    morphine 10 mg/ml injection 5 mg  5 mg IntraVENous Q4H PRN    ondansetron (ZOFRAN) injection 4 mg  4 mg IntraVENous Q4H PRN    magnesium hydroxide (MILK OF MAGNESIA) 400 mg/5 mL oral suspension 30 mL  30 mL Oral DAILY PRN    heparin (porcine) injection 5,000 Units  5,000 Units SubCUTAneous Q8H    hydrALAZINE (APRESOLINE) 20 mg/mL injection 20 mg  20 mg IntraVENous Q6H PRN    [START ON 7/5/2020] levothyroxine (SYNTHROID) injection 56 mcg  56 mcg IntraVENous DAILY         Other Studies:  Xr Abd (kub)    Result Date: 6/28/2020  Clinical history: NG tube placement TECHNIQUE: AP supine upper abdominal x-ray. Findings/impression: Enteric tube courses below the diaphragm with the tip in the expected region of the mid stomach. Assessment and Plan:     Active Hospital Problems    Diagnosis Date Noted    Hypokalemia 06/28/2020    Small bowel obstruction ([address] 06/27/2020    FORREST (obstructive sleep apnea) 06/27/2020    Chronic bilateral low back pain 06/27/2020    Type 2 diabetes mellitus with diabetic neuropathy ([facility] 09/24/2018    Obesity, morbid ([facility] 12/27/2017    Essential hypertension 12/27/2017     Plan:    Small bowel obstruction likely due to adhesions and large ventral hernia  Imaging confirms small bowel obstruction. Surgery recommending conservative management.  NG tube to LIS   Keep n.p.o. for now   Follow-up surgical recommendations   If symptoms worsen, stat imaging and serial abdominal exams     FORREST  Continue with bilevel at bedtime as per home recommendations     Type 2 diabetes with neuropathy  Hold oral hypoglycemics  - ISS for now    Essential hypertension  Given n.p.o. status, will hold off on p.o. meds. - Hydralazine IV push PRN     Hypothyroidism  - Dosing switch to IV while n.p.o.     Chronic pain syndrome  Fibromyalgia  Chronic back pain  Patient on heavy narcotics as per home meds. Will start with morphine as needed for pain control IV    Hypokalemia: replete and monitor    Diet:  DIET NPO  DVT PPx: heparin sq  Code: Full Code  Dispo: pending clinical course and PT/OT Eval  Estimated Discharge: TBD based on clinical course    Labs/Imaging Reviewed. Patient is high risk due to current condition and comorbid conditions as well as requiring frequent monitoring. Plan discussed with staff, patient/family and are in agreement. Time Spent: Greater than 45 minutes was spent in reviewing charts, physical exam, discussion with patient, and answered any questions.     Signed By: Nikko Aguilar MD     June 28, 2020

## 2020-06-28 NOTE — PROGRESS NOTES
Chart screened by  for potential discharge needs or concerns. None identified at this time. Please notify/consult  if any discharge needs arise.     Care Management Interventions  PCP Verified by CM: Yes(VIRTUAL VISIT)  Last Visit to PCP: 05/27/20  Discharge Location  Discharge Placement: Unable to determine at this time

## 2020-06-28 NOTE — PROGRESS NOTES
Problem: Falls - Risk of  Goal: *Absence of Falls  Description: Document Keven Luis Fall Risk and appropriate interventions in the flowsheet.   Outcome: Progressing Towards Goal  Note: Fall Risk Interventions:  Mobility Interventions: Bed/chair exit alarm, Patient to call before getting OOB    Mentation Interventions: Bed/chair exit alarm    Medication Interventions: Bed/chair exit alarm, Evaluate medications/consider consulting pharmacy                   Problem: Patient Education: Go to Patient Education Activity  Goal: Patient/Family Education  Outcome: Progressing Towards Goal     Problem: General Medical Care Plan  Goal: *Vital signs within specified parameters  Outcome: Progressing Towards Goal  Goal: *Labs within defined limits  Outcome: Progressing Towards Goal  Goal: *Absence of infection signs and symptoms  Outcome: Progressing Towards Goal  Goal: *Optimal pain control at patient's stated goal  Outcome: Progressing Towards Goal  Goal: *Skin integrity maintained  Outcome: Progressing Towards Goal  Goal: *Fluid volume balance  Outcome: Progressing Towards Goal  Goal: *Optimize nutritional status  Outcome: Progressing Towards Goal  Goal: *Anxiety reduced or absent  Outcome: Progressing Towards Goal  Goal: *Progressive mobility and function (eg: ADL's)  Outcome: Progressing Towards Goal     Problem: Patient Education: Go to Patient Education Activity  Goal: Patient/Family Education  Outcome: Progressing Towards Goal

## 2020-06-28 NOTE — PROGRESS NOTES
06/27/20 2100   Dual Skin Pressure Injury Assessment   Dual Skin Pressure Injury Assessment WDL   Second Care Provider (Based on [address] [place] [address]) Hossein Rahman RN   Skin Integumentary   Skin Integumentary (WDL) X    Pressure  Injury Documentation No Pressure Injury Noted-Pressure Ulcer Prevention Initiated   Skin Color Appropriate for ethnicity   Skin Condition/Temp Dry;Warm;Other (comment)  (dog marks on skin, old scar on right knee)   Turgor Non-tenting   Hair Growth Present   Varicosities Absent   Wound Prevention and Protection Methods   Orientation of Wound Prevention Posterior   Location of Wound Prevention Sacrum/Coccyx   Dressing Present  No   Wound Offloading (Prevention Methods) Bed, pressure redistribution/air;Bed, pressure reduction mattress;Pillows;Repositioning

## 2020-06-28 NOTE — PROGRESS NOTES
Pass a little flatus, no abdominal pain this morning  AFVSS  AAOx3  Lung clear  abd obese, soft    Encourage activity. Continue conservative management.

## 2020-06-29 LAB
ALBUMIN SERPL-MCNC: 3 G/DL (ref 3.2–4.6)
ALBUMIN/GLOB SERPL: 0.9 {RATIO} (ref 1.2–3.5)
ALP SERPL-CCNC: 114 U/L (ref 50–136)
ALT SERPL-CCNC: 24 U/L (ref 12–65)
ANION GAP SERPL CALC-SCNC: 8 MMOL/L (ref 7–16)
AST SERPL-CCNC: 17 U/L (ref 15–37)
BILIRUB SERPL-MCNC: 0.5 MG/DL (ref 0.2–1.1)
BUN SERPL-MCNC: 11 MG/DL (ref 8–23)
CALCIUM SERPL-MCNC: 8.4 MG/DL (ref 8.3–10.4)
CHLORIDE SERPL-SCNC: 99 MMOL/L (ref 98–107)
CO2 SERPL-SCNC: 32 MMOL/L (ref 21–32)
CREAT SERPL-MCNC: 0.58 MG/DL (ref 0.6–1)
ERYTHROCYTE [DISTWIDTH] IN BLOOD BY AUTOMATED COUNT: 12.8 % (ref 11.9–14.6)
GLOBULIN SER CALC-MCNC: 3.5 G/DL (ref 2.3–3.5)
GLUCOSE SERPL-MCNC: 95 MG/DL (ref 65–100)
HCT VFR BLD AUTO: 38.6 % (ref 35.8–46.3)
HGB BLD-MCNC: 12.7 G/DL (ref 11.7–15.4)
MCH RBC QN AUTO: 31.9 PG (ref 26.1–32.9)
MCHC RBC AUTO-ENTMCNC: 32.9 G/DL (ref 31.4–35)
MCV RBC AUTO: 97 FL (ref 79.6–97.8)
NRBC # BLD: 0 K/UL (ref 0–0.2)
PLATELET # BLD AUTO: 285 K/UL (ref 150–450)
PMV BLD AUTO: 10.3 FL (ref 9.4–12.3)
POTASSIUM SERPL-SCNC: 3.6 MMOL/L (ref 3.5–5.1)
PROT SERPL-MCNC: 6.5 G/DL (ref 6.3–8.2)
RBC # BLD AUTO: 3.98 M/UL (ref 4.05–5.2)
SODIUM SERPL-SCNC: 139 MMOL/L (ref 136–145)
WBC # BLD AUTO: 7.5 K/UL (ref 4.3–11.1)

## 2020-06-29 PROCEDURE — 97161 PT EVAL LOW COMPLEX 20 MIN: CPT

## 2020-06-29 PROCEDURE — 74011250637 HC RX REV CODE- 250/637: Performed by: HOSPITALIST

## 2020-06-29 PROCEDURE — 94760 N-INVAS EAR/PLS OXIMETRY 1: CPT

## 2020-06-29 PROCEDURE — 65270000029 HC RM PRIVATE

## 2020-06-29 PROCEDURE — 74011250636 HC RX REV CODE- 250/636: Performed by: INTERNAL MEDICINE

## 2020-06-29 PROCEDURE — 76937 US GUIDE VASCULAR ACCESS: CPT

## 2020-06-29 PROCEDURE — 97530 THERAPEUTIC ACTIVITIES: CPT

## 2020-06-29 PROCEDURE — 94640 AIRWAY INHALATION TREATMENT: CPT

## 2020-06-29 PROCEDURE — 74011250637 HC RX REV CODE- 250/637: Performed by: INTERNAL MEDICINE

## 2020-06-29 PROCEDURE — 77030037759

## 2020-06-29 PROCEDURE — 80053 COMPREHEN METABOLIC PANEL: CPT

## 2020-06-29 PROCEDURE — 85027 COMPLETE CBC AUTOMATED: CPT

## 2020-06-29 PROCEDURE — 77010033678 HC OXYGEN DAILY

## 2020-06-29 PROCEDURE — 36415 COLL VENOUS BLD VENIPUNCTURE: CPT

## 2020-06-29 RX ADMIN — MORPHINE SULFATE 5 MG: 10 INJECTION, SOLUTION INTRAMUSCULAR; INTRAVENOUS at 02:44

## 2020-06-29 RX ADMIN — BUDESONIDE AND FORMOTEROL FUMARATE DIHYDRATE 2 PUFF: 160; 4.5 AEROSOL RESPIRATORY (INHALATION) at 08:10

## 2020-06-29 RX ADMIN — BUDESONIDE AND FORMOTEROL FUMARATE DIHYDRATE 2 PUFF: 160; 4.5 AEROSOL RESPIRATORY (INHALATION) at 20:02

## 2020-06-29 RX ADMIN — MORPHINE SULFATE 5 MG: 10 INJECTION, SOLUTION INTRAMUSCULAR; INTRAVENOUS at 08:46

## 2020-06-29 RX ADMIN — Medication 5 ML: at 05:23

## 2020-06-29 RX ADMIN — Medication 1 SPRAY: at 12:17

## 2020-06-29 RX ADMIN — MORPHINE SULFATE 5 MG: 10 INJECTION, SOLUTION INTRAMUSCULAR; INTRAVENOUS at 20:47

## 2020-06-29 RX ADMIN — Medication 5 ML: at 16:22

## 2020-06-29 RX ADMIN — HEPARIN SODIUM 5000 UNITS: 5000 INJECTION INTRAVENOUS; SUBCUTANEOUS at 05:23

## 2020-06-29 RX ADMIN — HEPARIN SODIUM 5000 UNITS: 5000 INJECTION INTRAVENOUS; SUBCUTANEOUS at 16:22

## 2020-06-29 RX ADMIN — MORPHINE SULFATE 5 MG: 10 INJECTION, SOLUTION INTRAMUSCULAR; INTRAVENOUS at 13:04

## 2020-06-29 RX ADMIN — HEPARIN SODIUM 5000 UNITS: 5000 INJECTION INTRAVENOUS; SUBCUTANEOUS at 20:48

## 2020-06-29 RX ADMIN — Medication 10 ML: at 20:48

## 2020-06-29 NOTE — PROGRESS NOTES
Name: Mimi Faust MRN: [id number]  : 1960  Age:59 y.o.  female  Admit Date:  2020 LOS: 2      Hospitalist Progress Note    Mimi Faust is a 62 y.o. female with medical history significant for morbid obesity, hypertension, anxiety, sleep apnea, hypothyroidism presents as a direct transfer from urgent care center after being found with a small bowel obstruction. Patient reports symptoms for started 12 days ago. Imaging done at Urgent care on  showed small bowel obstruction. Patient was admitted to another facility overnight for management of small bowel obstruction. She did not have NG tube placed at that time and she had a large bowel movement in the morning and was able to tolerate liquid diet. She was discharged following day and went home. However, after discharge she noted that her stomach was becoming more uncomfortable. Repeat imaging at Urgent care on  showed showed a small bowel obstruction and she was then transferred to [address] [place] [address]. Surgery was consulted and recommended conservative management. Subjective (20) :  Patient is seen and examined at bedside. No acute events reported overnight by nursing staff. No BM so far but having flatus. No abdominal pain or nausea. Patient is still on NGT to [facility]. Patient denies fever, chills, chest pains, shortness of breath, n/v, abdominal pain. ROS: 10 point review of systems is otherwise negative with the exception of the elements mentioned above.     Objective:    Patient Vitals for the past 24 hrs:   Temp Pulse Resp BP SpO2   20 0815 97.9 °F (36.6 °C) 64 20 178/84 93 %   20 0811     96 %   20 0400 97.7 °F (36.5 °C) 71 18 165/75 94 %   20 0000 98.2 °F (36.8 °C) 71 18 145/80 96 %   20 2127     96 %   20 2000 98.2 °F (36.8 °C) 76 18 134/74 98 %   20 1534 97.9 °F (36.6 °C) 66 18 161/73 93 %   20 1133 97.9 °F (36.6 °C) 70 18 144/78 95 %     Oxygen Therapy  O2 Sat (%): 93 % (06/29/20 0815)  Pulse via Oximetry: 74 beats per minute (06/29/20 0811)  O2 Device: Nasal cannula (06/29/20 0811)  O2 Flow Rate (L/min): 2 l/min (06/29/20 0811)    Estimated body mass index is 9,823.31 kg/m² as calculated from the following:    Height as of this encounter: 5.2\" (0.132 m). Weight as of this encounter: 171.4 kg (377 lb 12.8 oz). No intake or output data in the 24 hours ending 06/29/20 1048    *Note that automatically entered I/Os may not be accurate; dependent on patient compliance with collection and accurate  by techs. Physical Exam:   General:     alert, awake, no acute distress. Well nourished. Obese  Head:   normocephalic, atraumatic  Eyes, Ears, nose: PERRL, EOMI. Normal conjunctiva  Neck:    supple, non-tender. Trachea midline. Lungs:   CTAB, no wheezing, rhonchi, rales  Cardiac:   RRR, Normal S1 and S2. Abdomen:   Soft, non distended, nontender, +BS  Extremities:   Warm, dry. No edema   Skin:   No rashes, no jaundice  Neuro:  AAOx3. No gross focal neurological deficit  Psychiatric:  No anxiety, calm, cooperative    Data Review:  I have reviewed all labs, meds, and studies from the last 24 hours:    Recent Results (from the past 24 hour(s))   METABOLIC PANEL, COMPREHENSIVE    Collection Time: 06/29/20  5:43 AM   Result Value Ref Range    Sodium 139 136 - 145 mmol/L    Potassium 3.6 3.5 - 5.1 mmol/L    Chloride 99 98 - 107 mmol/L    CO2 32 21 - 32 mmol/L    Anion gap 8 7 - 16 mmol/L    Glucose 95 65 - 100 mg/dL    BUN 11 8 - 23 MG/DL    Creatinine 0.58 (L) 0.6 - 1.0 MG/DL    GFR est AA >60 >60 ml/min/1.73m2    GFR est non-AA >60 >60 ml/min/1.73m2    Calcium 8.4 8.3 - 10.4 MG/DL    Bilirubin, total 0.5 0.2 - 1.1 MG/DL    ALT (SGPT) 24 12 - 65 U/L    AST (SGOT) 17 15 - 37 U/L    Alk.  phosphatase 114 50 - 136 U/L    Protein, total 6.5 6.3 - 8.2 g/dL    Albumin 3.0 (L) 3.2 - 4.6 g/dL    Globulin 3.5 2.3 - 3.5 g/dL    A-G Ratio 0.9 (L) 1.2 - 3.5 CBC W/O DIFF    Collection Time: 06/29/20  5:43 AM   Result Value Ref Range    WBC 7.5 4.3 - 11.1 K/uL    RBC 3.98 (L) 4.05 - 5.2 M/uL    HGB 12.7 11.7 - 15.4 g/dL    HCT 38.6 35.8 - 46.3 %    MCV 97.0 79.6 - 97.8 FL    MCH 31.9 26.1 - 32.9 PG    MCHC 32.9 31.4 - 35.0 g/dL    RDW 12.8 11.9 - 14.6 %    PLATELET 247 394 - 196 K/uL    MPV 10.3 9.4 - 12.3 FL    ABSOLUTE NRBC 0.00 0.0 - 0.2 K/uL        All Micro Results     None          Current Meds:  Current Facility-Administered Medications   Medication Dose Route Frequency    phenol throat spray (CHLORASEPTIC) 1 Spray  1 [place]ezi Oral PRN    budesonide-formoteroL (SYMBICORT) 160-4.5 mcg/actuation HFA inhaler 2 Puff  2 Puff Inhalation BID RT    fluticasone propionate (FLONASE) 50 mcg/actuation nasal spray 2 Spray  2 Spray Both Nostrils DAILY    sodium chloride (NS) flush 5-40 mL  5-40 mL IntraVENous Q8H    sodium chloride (NS) flush 5-40 mL  5-40 mL IntraVENous PRN    naloxone (NARCAN) injection 0.4 mg  0.4 mg IntraVENous PRN    morphine 10 mg/ml injection 5 mg  5 mg IntraVENous Q4H PRN    ondansetron (ZOFRAN) injection 4 mg  4 mg IntraVENous Q4H PRN    magnesium hydroxide (MILK OF MAGNESIA) 400 mg/5 mL oral suspension 30 mL  30 mL Oral DAILY PRN    heparin (porcine) injection 5,000 Units  5,000 Units SubCUTAneous Q8H    hydrALAZINE (APRESOLINE) 20 mg/mL injection 20 mg  20 mg IntraVENous Q6H PRN    [START ON 7/5/2020] levothyroxine (SYNTHROID) injection 56 mcg  56 mcg IntraVENous DAILY         Other Studies:  Xr Abd (kub)    Result Date: 6/28/2020  Clinical history: NG tube placement TECHNIQUE: AP supine upper abdominal x-ray. Findings/impression: Enteric tube courses below the diaphragm with the tip in the expected region of the mid stomach. Assessment and Plan:     Active Hospital Problems    Diagnosis Date Noted    Hypokalemia 06/28/2020    Small bowel obstruction ([address] 06/27/2020    FORREST (obstructive sleep apnea) 06/27/2020    Chronic bilateral low back pain 06/27/2020    Type 2 diabetes mellitus with diabetic neuropathy ([facility] 09/24/2018    Obesity, morbid ([facility] 12/27/2017    Essential hypertension 12/27/2017     Plan:    Small bowel obstruction likely due to adhesions and large ventral hernia  Imaging confirms small bowel obstruction. Surgery recommending conservative management.  NG tube to LIS   Keep n.p.o. for now   Follow-up surgical recommendations   If symptoms worsen, stat imaging and serial abdominal exams     FORREST  Continue with bilevel at bedtime as per home recommendations     Type 2 diabetes with neuropathy  Hold oral hypoglycemics  - ISS for now    Essential hypertension  Given n.p.o. status, will hold off on p.o. meds. - Hydralazine IV push PRN     Hypothyroidism  - Dosing switch to IV while n.p.o.     Chronic pain syndrome  Fibromyalgia  Chronic back pain  Patient on heavy narcotics as per home meds. - IV pain meds PRN     Hypokalemia: replete and monitor    Diet:  DIET NPO  DVT PPx: heparin sq  Code: Full Code  Dispo: pending clinical course and PT/OT Eval  Estimated Discharge: TBD based on clinical course    Labs/Imaging Reviewed. Patient is moderate risk due to current condition and comorbid conditions as well as requiring frequent monitoring. Plan discussed with staff, patient/family and are in agreement. Time Spent: Greater than 45 minutes was spent in reviewing charts, physical exam, discussion with patient, and answered any questions.     Signed By: Juma Ward MD     June 29, 2020

## 2020-06-29 NOTE — PROGRESS NOTES
Chart review completed, pt remains with NG to LIS at this time, CM will remain available for dc needs.

## 2020-06-29 NOTE — PROGRESS NOTES
Problem: Mobility Impaired (Adult and Pediatric)  Goal: *Acute Goals and Plan of Care (Insert Text)  Description: STG:  (1.)Ms. Luis F Palomares will move from supine to sit and sit to supine , scoot up and down and roll side to side with MODIFIED INDEPENDENCE within 3 treatment day(s). (2.)Ms. Luis F Palomares will transfer from bed to chair and chair to bed with SUPERVISION using the least restrictive device within 3 treatment day(s). (3.)Ms. Luis F Palomares will ambulate with STAND BY ASSIST for 75 feet with the least restrictive device within 3 treatment day(s). (4.)Ms. Luis F Palomares will perform standing static and dynamic balance activities x 15 minutes with STAND BY ASSIST to improve safety within 3 day(s). (5.)Ms. Luis F Palomares will maintain stable vital signs throughout all functional mobility within 3 days. LTG:  (1.)Ms. Luis F Palomares will move from supine to sit and sit to supine , scoot up and down and roll side to side in bed with INDEPENDENT within 7 treatment day(s). (2.)Ms. Luis F Palomares will transfer from bed to chair and chair to bed with MODIFIED INDEPENDENCE using the least restrictive device within 7 treatment day(s). (3.)Ms. Luis F Palomares will ambulate with MODIFIED INDEPENDENCE for 125 feet with the least restrictive device within 7 treatment day(s). (4.)Ms. Luis F Palomares will perform standing static and dynamic balance activities x 15 minutes with MODIFIED INDEPENDENCE to improve safety within 7 day(s).   ________________________________________________________________________________________________     Outcome: Progressing Towards Goal     PHYSICAL THERAPY: Initial Assessment, Daily Note, and PM 6/29/2020  INPATIENT: PT Visit Days : 1  [place] / [place] / Product Type: Medicare /       NAME/AGE/GENDER: Johnathan Perez is a 62 y.o. female   PRIMARY DIAGNOSIS: Small bowel obstruction ([facility] [K56.609] Small bowel obstruction ([facility] Small bowel obstruction ([facility]        ICD-10: Treatment Diagnosis: Generalized Muscle Weakness (M62.81)  Difficulty in walking, Not elsewhere classified (R26.2)  Repeated Falls (R29.6)   Precaution/Allergies:  Codeine and Sulfa (sulfonamide antibiotics)      ASSESSMENT:     Ms. Galina Allen is a 62 y.o. female admitted for small bowel obstruction. She lives with sister in law in a 2 story home with all needs on the main level. She typically ambulates with a straight cane, admits to 3 falls in the past 6 months. She reports lately she has been scooting around in an office chair and that she is working towards getting an electric wheelchair, which she would significantly benefit from due to poor activity tolerance. She is supine on contact, on 2 L/min O2 with sister at bedside and agreeable to physical therapy evaluation and treatment. RN ok'ed patient off NG suction for therapy evaluation/treatment. O2 removed for mobility as patient is not typically on O2 at home and SpO2 98% at rest. She transferred to sitting with supervision, stood with CGA and ambulated 7' to bedside commode with bariatric rolling walker. Intact sitting balance appreciated. Patient able to perform sergio care without assistance. SpO2 92% on room air post toileting. Treatment initiated to include sit to stand transfers, standing balance activities, ambulation 50' with rolling walker and CGA/SBA with cues for safety and pacing. SpO2 post ambulation was 89%, educated on pursed lip breathing which patient performed with visual and verbal cues, improved SpO2 to 98% on room air. Replaced 2 L/min O2 and patient returned to supine with supervision. Jason Montemayor is currently functioning below her baseline and would benefit from skilled PT during acute care stay to maximize safety and independence with functional mobility.     This section established at most recent assessment   PROBLEM LIST (Impairments causing functional limitations):  Decreased Strength  Decreased ADL/Functional Activities  Decreased Transfer Abilities  Decreased Ambulation Ability/Technique  Decreased Balance  Decreased Activity Tolerance  Decreased Pacing Skills  Increased Shortness of Breath  Decreased Knowledge of Precautions  Decreased [place] with Home Exercise Program   INTERVENTIONS PLANNED: (Benefits and precautions of physical therapy have been discussed with the patient.)  Balance Exercise  Bed Mobility  Family Education  Gait Training  Home Exercise Program (HEP)  Neuromuscular Re-education/Strengthening  Therapeutic Activites  Therapeutic Exercise/Strengthening  Transfer Training     TREATMENT PLAN: Frequency/Duration: 3 times a week for duration of hospital stay  Rehabilitation Potential For Stated Goals: Good     REHAB RECOMMENDATIONS (at time of discharge pending progress):    Placement: It is my opinion, based on this patient's performance to date, that Ms. Debi Snyder may benefit from OUTPATIENT THERAPY after discharge due to the functional deficits listed above that are likely to improve with skilled rehabilitation because because he/she will benefit from the individualized approach tailored to his/her deficits. Equipment:   None at this time              HISTORY:   History of Present Injury/Illness (Reason for Referral): Maria Elena Nava is a 62 y.o. female with past medical history significant for morbid obesity, hypertension, anxiety, sleep apnea, hypothyroidism presents as a direct transfer from urgent care center after being found with a small bowel obstruction. Patient reports symptoms for started 12 days ago. At that time, patient states symptoms are mild. He did not improve so she went to an urgent care center on Tuesday. Imaging done there showed a small bowel obstruction. Patient was admitted to another facility overnight for management of small bowel obstruction. She did not have NG tube placed at that time and she had a large bowel movement in the morning and was able to tolerate liquid diet.   She was discharged following day and went home. However, after discharge she noted that her stomach was becoming more uncomfortable. Symptoms continue to worsen and she decided to go to urgent care this morning. She had reimaging done by CT scan which showed a small bowel obstruction. Patient was then transferred to [facility] for further evaluation. Patient denies any fevers, chills, shortness of breath, chest pain. She has had 10 episodes of vomiting since this morning. She reports no worsening leg swelling or bloody bowel movements. 10 systems reviewed and negative except as noted in HPI. Past Medical History/Comorbidities:   Ms. Kwaku Meza  has a past medical history of Acquired hypothyroidism (12/27/2017), Anxiety, Arthritis, Depression, Fibromyalgia, Hypertension (1977), Obesity, morbid ([address] Psychiatric disorder (2004), Psychiatric disorder (2004), Sleep apnea, Type 2 diabetes mellitus without complication, without long-term current use of insulin ([address] (12/27/2017), and Unspecified adverse effect of anesthesia. She also has no past medical history of Arrhythmia, Asthma, Autoimmune disease ([address] CAD (coronary artery disease), Cancer ([address] Chronic kidney disease, Chronic pain, Coagulation defects, COPD, Difficult intubation, GERD (gastroesophageal reflux disease), Heart failure ([address] Liver disease, Malignant hyperthermia due to anesthesia, Nausea & vomiting, Other ill-defined conditions(799.89), Pseudocholinesterase deficiency, PUD (peptic ulcer disease), Seizures ([address] or Thromboembolus ([address] Ms. Kwaku Meza  has a past surgical history that includes pr uns oral surg proc by report (2000); hx appendectomy (1973); hx cholecystectomy (1990); hx gastric bypass (2002); hx orthopaedic (1975); hx orthopaedic (July 2009); hx dilation and curettage; and hx gyn.   Social History/Living Environment:   Home Environment: Private residence  Wheelchair Ramp: Yes  One/Two Story Residence: Two story, live on 1st floor  Living Alone: No  Support Systems: Family member(s)  Patient Expects to be Discharged to[de-identified] Private residence  Current DME Used/Available at Home: Qing Cohen, rollator, Cane, straight, Shower chair  Tub or Shower Type: Shower  Prior Level of Function/Work/Activity:  She lives with sister in law in a 2 story home with all needs on the main level. She typically ambulates with a straight cane, admits to 3 falls in the past 6 months. She reports lately she has been scooting around in an office chair and that she is working towards getting an electric wheelchair, which she would significantly benefit from due to poor activity tolerance. Number of Personal Factors/Comorbidities that affect the Plan of Care: 3+: HIGH COMPLEXITY   EXAMINATION:   Most Recent Physical Functioning:   Gross Assessment:  AROM: Within functional limits  PROM: Within functional limits  Strength: Generally decreased, functional  Coordination: Within functional limits               Posture:  Posture (WDL): Exceptions to WDL  Posture Assessment: Forward head  Balance:  Sitting: Intact  Standing: Impaired  Standing - Static: Good  Standing - Dynamic : Fair Bed Mobility:  Rolling: Supervision  Supine to Sit: Supervision  Sit to Supine: Supervision  Scooting: Supervision  Wheelchair Mobility:     Transfers:  Sit to Stand: Contact guard assistance  Stand to Sit: Contact guard assistance  Gait:     Base of Support: Center of gravity altered; Widened  Speed/Bree: Shuffled; Slow  Step Length: Right shortened;Left shortened  Gait Abnormalities: Decreased step clearance; Path deviations; Shuffling gait;Trunk sway increased  Distance (ft): 50 Feet (ft)  Assistive Device: Walker, rolling  Ambulation - Level of Assistance: Contact guard assistance;Stand-by assistance  Interventions: Safety awareness training;Verbal cues; Visual/Demos      Body Structures Involved:  Nerves  Heart  Lungs  Digestive Structures  Muscles Body Functions Affected:  Sensory/Pain  Cardio  Neuromusculoskeletal  Movement Related  Digestive Activities and Participation Affected: Mobility  Self Care  Domestic Life  Interpersonal Interactions and Relationships  [facility], [facility] Christiana Camacho    Number of elements that affect the Plan of Care: 4+: HIGH COMPLEXITY   CLINICAL PRESENTATION:   Presentation: Stable and uncomplicated: LOW COMPLEXITY   CLINICAL DECISION MAKIN [facility] Mobility Inpatient Short Form  How much difficulty does the patient currently have. .. Unable A Lot A Little None   1. Turning over in bed (including adjusting bedclothes, sheets and blankets)? [] 1   [] 2   [] 3   [x] 4   2. Sitting down on and standing up from a chair with arms ( e.g., wheelchair, bedside commode, etc.)   [] 1   [] 2   [x] 3   [] 4   3. Moving from lying on back to sitting on the side of the bed? [] 1   [] 2   [] 3   [x] 4   How much help from another person does the patient currently need. .. Total A Lot A Little None   4. Moving to and from a bed to a chair (including a wheelchair)? [] 1   [] 2   [x] 3   [] 4   5. Need to walk in hospital room? [] 1   [] 2   [x] 3   [] 4   6. Climbing 3-5 steps with a railing? [] 1   [x] 2   [] 3   [] 4   © , Trustees of [facility] [employer] [employer], under license to [employer]. All rights reserved      Score:  Initial: 19 Most Recent: X (Date: -- )    Interpretation of Tool:  Represents activities that are increasingly more difficult (i.e. Bed mobility, Transfers, Gait). Medical Necessity:     Patient demonstrates   good   rehab potential due to higher previous functional level. Reason for Services/Other Comments:  Patient   continues to require modification of therapeutic interventions to increase complexity of exercises  .    Use of outcome tool(s) and clinical judgement create a POC that gives a: Clear prediction of patient's progress: LOW COMPLEXITY            TREATMENT:   (In addition to Assessment/Re-Assessment sessions the following treatments were rendered)   Pre-treatment Symptoms/Complaints:  none  Pain: Initial:   Pain Intensity 1: 0  Post Session:  0     Therapeutic Activity: (    23 minutes): Therapeutic activities including Chair transfers and Ambulation on level ground to improve mobility, strength, and balance. Required minimal Safety awareness training;Verbal cues; Visual/Demos to promote static and dynamic balance in standing. Braces/Orthotics/Lines/Etc:   nasogastric tube  O2 Device: Nasal cannula  Treatment/Session Assessment:    Response to Treatment:  Patient dropped O2 to 89% on room air, improved with pursed lip breathing. Interdisciplinary Collaboration:   Physical Therapist  Registered Nurse  After treatment position/precautions:   Supine in bed  Bed/Chair-wheels locked  Bed in low position  Call light within reach  RN notified  Family at bedside   Compliance with Program/Exercises: Will assess as treatment progresses  Recommendations/Intent for next treatment session: \"Next visit will focus on advancements to more challenging activities and reduction in assistance provided\".   Total Treatment Duration:  PT Patient Time In/Time Out  Time In: 1313  Time Out: Millicent 73, PT, DPT

## 2020-06-29 NOTE — PROGRESS NOTES
Problem: Falls - Risk of  Goal: *Absence of Falls  Description: Document Maria Guadalupe Stauffer Fall Risk and appropriate interventions in the flowsheet.   Outcome: Progressing Towards Goal  Note: Fall Risk Interventions:  Mobility Interventions: Bed/chair exit alarm, Communicate number of staff needed for ambulation/transfer, Patient to call before getting OOB    Mentation Interventions: Bed/chair exit alarm, Door open when patient unattended, Increase mobility    Medication Interventions: Bed/chair exit alarm, Evaluate medications/consider consulting pharmacy, Patient to call before getting OOB, Teach patient to arise slowly    Elimination Interventions: Bed/chair exit alarm, Call light in reach, Stay With Me (per policy), Patient to call for help with toileting needs, Toilet paper/wipes in reach, Toileting schedule/hourly rounds              Problem: Patient Education: Go to Patient Education Activity  Goal: Patient/Family Education  Outcome: Progressing Towards Goal     Problem: General Medical Care Plan  Goal: *Vital signs within specified parameters  Outcome: Progressing Towards Goal  Goal: *Labs within defined limits  Outcome: Progressing Towards Goal  Goal: *Absence of infection signs and symptoms  Outcome: Progressing Towards Goal  Goal: *Optimal pain control at patient's stated goal  Outcome: Progressing Towards Goal  Goal: *Skin integrity maintained  Outcome: Progressing Towards Goal  Goal: *Fluid volume balance  Outcome: Progressing Towards Goal  Goal: *Optimize nutritional status  Outcome: Progressing Towards Goal  Goal: *Anxiety reduced or absent  Outcome: Progressing Towards Goal  Goal: *Progressive mobility and function (eg: ADL's)  Outcome: Progressing Towards Goal     Problem: Patient Education: Go to Patient Education Activity  Goal: Patient/Family Education  Outcome: Progressing Towards Goal     Problem: Gas Exchange - Impaired  Goal: *Absence of hypoxia  Outcome: Progressing Towards Goal     Problem: Pressure Injury - Risk of  Goal: *Prevention of pressure injury  Description: Document Randal Scale and appropriate interventions in the flowsheet.   Outcome: Progressing Towards Goal  Note: Pressure Injury Interventions:  Sensory Interventions: Assess changes in LOC, Avoid rigorous massage over bony prominences    Moisture Interventions: Absorbent underpads, Check for incontinence Q2 hours and as needed    Activity Interventions: Increase time out of bed, Pressure redistribution bed/mattress(bed type), PT/OT evaluation    Mobility Interventions: HOB 30 degrees or less, Pressure redistribution bed/mattress (bed type), PT/OT evaluation    Nutrition Interventions: Document food/fluid/supplement intake, Offer support with meals,snacks and hydration    Friction and Shear Interventions: HOB 30 degrees or less                Problem: Patient Education: Go to Patient Education Activity  Goal: Patient/Family Education  Outcome: Progressing Towards Goal

## 2020-06-29 NOTE — PROGRESS NOTES
#*20g 1in PIV inserted into R shoulder using aseptic technique. Pt tolerated well.      Unsuccessful iv attempt x2, with accucath lot [id number] under US guidance      Kenneth Agustin RN VAT

## 2020-06-29 NOTE — PROGRESS NOTES
H&P/Consult Note/Progress Note/Office Note:   Johnathan Perez  MRN: [id number]  :1960  Age:59 y.o.    HPI: Johnathan Perez is a 62 y.o. female who is seen for SBO. She developed nausea/vomiting with abdominal pain symptoms 12 days ago, she has been in and out of [facility] and [facility], was just discharged two days ago, but her symptoms returned today with nausea, vomiting. Her last BM and flatus was about 2 days ago. Her outside CT reports SBO with possible incarcerated hernia. She is extremely obese with BMI 63. She had numerous abdominal surgeries including open gastric bypass, open rashid. Her other medical issues are reviewed as below. 2020: Awake in bed, no complaints. Abd obese but soft. AF, VSS. +flatus. No BM. NG output clear with brown tinge--550mL out last 24h.        Past Medical History:   Diagnosis Date    Acquired hypothyroidism 2017    Anxiety     Arthritis     Depression      & ANXIETY    Fibromyalgia     Hypertension     Obesity, morbid ([address]     Psychiatric disorder     Anxiety disorder    Psychiatric disorder     depression    Sleep apnea     uses c-pap, non compliant    Type 2 diabetes mellitus without complication, without long-term current use of insulin ([facility] 2017    Unspecified adverse effect of anesthesia     after gastric bypass, woke up on vent     Past Surgical History:   Procedure Laterality Date    HX APPENDECTOMY      HX CHOLECYSTECTOMY      HX DILATION AND CURETTAGE      X 2    HX GASTRIC BYPASS      Dr. Monet Bennett    rt knee has screw in knee tendons streched    HX ORTHOPAEDIC  2009    Right knee replacement    [place] [facility] ORAL SURG PROC BY REPORT       Current Facility-Administered Medications   Medication Dose Route Frequency    phenol throat spray (CHLORASEPTIC) 1 Spray  1 [place] Oral PRN    budesonide-formoteroL (SYMBICORT) 160-4.5 mcg/actuation HFA inhaler 2 Puff  2 Puff Inhalation BID RT    fluticasone propionate (FLONASE) 50 mcg/actuation nasal spray 2 Spray  2 Spray Both Nostrils DAILY    sodium chloride (NS) flush 5-40 mL  5-40 mL IntraVENous Q8H    sodium chloride (NS) flush 5-40 mL  5-40 mL IntraVENous PRN    naloxone (NARCAN) injection 0.4 mg  0.4 mg IntraVENous PRN    morphine 10 mg/ml injection 5 mg  5 mg IntraVENous Q4H PRN    ondansetron (ZOFRAN) injection 4 mg  4 mg IntraVENous Q4H PRN    magnesium hydroxide (MILK OF MAGNESIA) 400 mg/5 mL oral suspension 30 mL  30 mL Oral DAILY PRN    heparin (porcine) injection 5,000 Units  5,000 Units SubCUTAneous Q8H    hydrALAZINE (APRESOLINE) 20 mg/mL injection 20 mg  20 mg IntraVENous Q6H PRN    [START ON 7/5/2020] levothyroxine (SYNTHROID) injection 56 mcg  56 mcg IntraVENous DAILY     Codeine and Sulfa (sulfonamide antibiotics)  Social History     Socioeconomic History    Marital status:      Spouse name: Not on file    Number of children: Not on file    Years of education: Not on file    Highest education level: Not on file   Tobacco Use    Smoking status: Never Smoker    Smokeless tobacco: Never Used   Substance and Sexual Activity    Alcohol use: No    Drug use: No     Social History     Tobacco Use   Smoking Status Never Smoker   Smokeless Tobacco Never Used     Family History   Problem Relation Age of Onset    Post-op Nausea/Vomiting Mother     COPD Mother     Heart Disease Mother     Heart Attack Mother     Arthritis-rheumatoid Mother     Diabetes Mother     Hypertension Mother     Heart Disease Father     Cancer Father     Heart Attack Father     Hypertension Father     Elevated Lipids Father     Hypertension Sister     Heart Attack Brother     Diabetes Maternal Grandfather     Ovarian Cancer Paternal Grandmother      ROS: The patient has no difficulty with chest pain or shortness of breath. No fever or chills.   Comprehensive review of systems was otherwise unremarkable except as noted above. Physical Exam:   Visit Vitals  /84 (BP 1 Location: Left arm, BP Patient Position: At rest)   Pulse 64   Temp 97.9 °F (36.6 °C)   Resp 20   Ht (!) 5.2\" (0.132 m)   Wt (!) 377 lb 12.8 oz (171.4 kg)   SpO2 93%   BMI 9823.31 kg/m²     Vitals:    06/29/20 0000 06/29/20 0400 06/29/20 0811 06/29/20 0815   BP: 145/80 165/75  178/84   Pulse: 71 71  64   Resp: 18 18  20   Temp: 98.2 °F (36.8 °C) 97.7 °F (36.5 °C)  97.9 °F (36.6 °C)   SpO2: 96% 94% 96% 93%   Weight:       Height:         No intake/output data recorded. 06/27 1901 - 06/29 0700  In: -   Out: 550     Constitutional: Alert, oriented, cooperative patient in no acute distress; appears stated age    Eyes:Sclera are clear. EOMs intact  ENMT: no external lesions gross hearing normal; no obvious neck masses, no ear or lip lesions, nares normal  CV: RRR. Normal perfusion  Resp: No JVD. Breathing is  non-labored; no audible wheezing. GI: soft, obese. A large left abdominal wall hernia, which is softer per her. Mild tenderness at low abdomen. No peritoneal signs. Musculoskeletal: unremarkable with normal function. No embolic signs or cyanosis.    Neuro:  Oriented; moves all 4; no focal deficits  Psychiatric: normal affect and mood, no memory impairment    Recent vitals (if inpt):  Patient Vitals for the past 24 hrs:   BP Temp Pulse Resp SpO2   06/29/20 0815 178/84 97.9 °F (36.6 °C) 64 20 93 %   06/29/20 0811     96 %   06/29/20 0400 165/75 97.7 °F (36.5 °C) 71 18 94 %   06/29/20 0000 145/80 98.2 °F (36.8 °C) 71 18 96 %   06/28/20 2127     96 %   06/28/20 2000 134/74 98.2 °F (36.8 °C) 76 18 98 %   06/28/20 1534 161/73 97.9 °F (36.6 °C) 66 18 93 %   06/28/20 1133 144/78 97.9 °F (36.6 °C) 70 18 95 %       Labs:  Recent Labs     06/29/20  0543   WBC 7.5   HGB 12.7         K 3.6   CL 99   CO2 32   BUN 11   CREA 0.58*   GLU 95   TBILI 0.5   ALT 24          Lab Results   Component Value Date/Time    WBC 7.5 06/29/2020 05:43 AM    HGB 12.7 06/29/2020 05:43 AM    PLATELET 838 75/18/7571 05:43 AM    Sodium 139 06/29/2020 05:43 AM    Potassium 3.6 06/29/2020 05:43 AM    Chloride 99 06/29/2020 05:43 AM    CO2 32 06/29/2020 05:43 AM    BUN 11 06/29/2020 05:43 AM    Creatinine 0.58 (L) 06/29/2020 05:43 AM    Glucose 95 06/29/2020 05:43 AM    INR 1.2 10/29/2009 05:05 AM    aPTT 26.4 10/21/2009 10:00 AM    Bilirubin, total 0.5 06/29/2020 05:43 AM    ALT (SGPT) 24 06/29/2020 05:43 AM    Alk. phosphatase 114 06/29/2020 05:43 AM       CT Results  (Last 48 hours)    None        chest X-ray      I reviewed recent labs, recent radiologic studies, and pertinent records including other doctor notes if needed. I independently reviewed radiology images for studies I described above or studies I have ordered.    Admission date (for inpatients): 6/27/2020   * No surgery found *  * No surgery found *    ASSESSMENT/PLAN:  Problem List  Date Reviewed: 2/27/2020          Codes Class Noted    Hypokalemia ICD-10-CM: E87.6  ICD-9-CM: 276.8  6/28/2020        * (Principal) Small bowel obstruction ([facility] ICD-10-CM: K56.609  ICD-9-CM: 560.9  6/27/2020        FORREST (obstructive sleep apnea) ICD-10-CM: G47.33  ICD-9-CM: 327.23  6/27/2020        Chronic bilateral low back pain ICD-10-CM: M54.5, G89.29  ICD-9-CM: 724.2, 338.29  6/27/2020        Type 2 diabetes with nephropathy ([facility] ICD-10-CM: E11.21  ICD-9-CM: 250.40, 583.81  2/27/2020        Depression, major, recurrent, mild (HCC) ICD-10-CM: F33.0  ICD-9-CM: 296.31  2/27/2020        Shortness of breath on exertion ICD-10-CM: R06.02  ICD-9-CM: 786.05  11/25/2019        Morbid obesity with BMI of 70 and over, adult [facility]) ICD-10-CM: E66.01, Z68.45  ICD-9-CM: 278.01, V85.45  12/12/2018        Poor tolerance for ambulation ICD-10-CM: Z78.9  ICD-9-CM: V49.89  10/11/2018        Type 2 diabetes mellitus with diabetic neuropathy (HCC) ICD-10-CM: E11.40  ICD-9-CM: 250.60, 357.2 9/24/2018        Obesity, morbid ([facility] ICD-10-CM: E66.01  ICD-9-CM: 278.01  12/27/2017        Restless legs syndrome ICD-10-CM: G25.81  ICD-9-CM: 333.94  12/27/2017        Anxiety ICD-10-CM: F41.9  ICD-9-CM: 300.00  12/27/2017        Essential hypertension ICD-10-CM: I10  ICD-9-CM: 401.9  12/27/2017        Migraine without status migrainosus, not intractable ICD-10-CM: H36.054  ICD-9-CM: 346.90  12/27/2017        Type 2 diabetes mellitus without complication, without long-term current use of insulin ([facility] ICD-10-CM: E11.9  ICD-9-CM: 250.00  12/27/2017        Acquired hypothyroidism ICD-10-CM: E03.9  ICD-9-CM: 244.9  12/27/2017        Osteoarthritis of left knee ICD-10-CM: M17.12  ICD-9-CM: 715.96  10/26/2009        S/P total knee replacement using cement ICD-10-CM: Z96.659  ICD-9-CM: V43.65  10/26/2009        Acute blood loss anemia ICD-10-CM: D62  ICD-9-CM: 285.1  7/28/2009        Osteoarthritis of right knee ICD-10-CM: M17.11  ICD-9-CM: 715.96  7/27/2009        S/P total knee replacement using cement ICD-10-CM: Z96.659  ICD-9-CM: V43.65  7/27/2009            Principal Problem:    Small bowel obstruction ([facility] (6/27/2020)    Active Problems:    Obesity, morbid ([facility] (12/27/2017)      Essential hypertension (12/27/2017)      Type 2 diabetes mellitus with diabetic neuropathy ([facility] (9/24/2018)      FORREST (obstructive sleep apnea) (6/27/2020)      Chronic bilateral low back pain (6/27/2020)      Hypokalemia (6/28/2020)       Care management per primary team  NPO  IVFs  NGT to LIWS  Monitor labs, replace lytes PRN  Serial exams  SBFT in AM  Hopeful for conservative management  Further input per attending surgeon  Will continue to monitor     Nadir Murray NP

## 2020-06-29 NOTE — PROGRESS NOTES
Problem: Falls - Risk of  Goal: *Absence of Falls  Description: Document Addie Sherwood Fall Risk and appropriate interventions in the flowsheet. Outcome: Progressing Towards Goal  Note: Fall Risk Interventions:  Mobility Interventions: Bed/chair exit alarm, Communicate number of staff needed for ambulation/transfer    Mentation Interventions: Bed/chair exit alarm    Medication Interventions: Bed/chair exit alarm    Elimination Interventions: Call light in reach, Bed/chair exit alarm              Problem: Patient Education: Go to Patient Education Activity  Goal: Patient/Family Education  Outcome: Progressing Towards Goal     Problem: General Medical Care Plan  Goal: *Vital signs within specified parameters  Outcome: Progressing Towards Goal  Goal: *Labs within defined limits  Outcome: Progressing Towards Goal  Goal: *Absence of infection signs and symptoms  Outcome: Progressing Towards Goal  Goal: *Optimal pain control at patient's stated goal  Outcome: Progressing Towards Goal  Goal: *Skin integrity maintained  Outcome: Progressing Towards Goal  Goal: *Fluid volume balance  Outcome: Progressing Towards Goal  Goal: *Optimize nutritional status  Outcome: Progressing Towards Goal  Goal: *Anxiety reduced or absent  Outcome: Progressing Towards Goal  Goal: *Progressive mobility and function (eg: ADL's)  Outcome: Progressing Towards Goal     Problem: Patient Education: Go to Patient Education Activity  Goal: Patient/Family Education  Outcome: Progressing Towards Goal     Problem: Gas Exchange - Impaired  Goal: *Absence of hypoxia  Outcome: Progressing Towards Goal     Problem: Pressure Injury - Risk of  Goal: *Prevention of pressure injury  Description: Document Randal Scale and appropriate interventions in the flowsheet.   Outcome: Progressing Towards Goal  Note: Pressure Injury Interventions:  Sensory Interventions: Assess changes in LOC, Avoid rigorous massage over bony prominences    Moisture Interventions: Absorbent underpads    Activity Interventions: Pressure redistribution bed/mattress(bed type)    Mobility Interventions: Pressure redistribution bed/mattress (bed type)    Nutrition Interventions: Document food/fluid/supplement intake    Friction and Shear Interventions: HOB 30 degrees or less                Problem: Patient Education: Go to Patient Education Activity  Goal: Patient/Family Education  Outcome: Progressing Towards Goal

## 2020-06-30 ENCOUNTER — APPOINTMENT (OUTPATIENT)
Dept: GENERAL RADIOLOGY | Age: 60
DRG: 394 | End: 2020-06-30
Attending: NURSE PRACTITIONER
Payer: MEDICARE

## 2020-06-30 LAB
ALBUMIN SERPL-MCNC: 3.1 G/DL (ref 3.2–4.6)
ALBUMIN/GLOB SERPL: 0.8 {RATIO} (ref 1.2–3.5)
ALP SERPL-CCNC: 120 U/L (ref 50–136)
ALT SERPL-CCNC: 22 U/L (ref 12–65)
ANION GAP SERPL CALC-SCNC: 12 MMOL/L (ref 7–16)
ANION GAP SERPL CALC-SCNC: 15 MMOL/L (ref 7–16)
AST SERPL-CCNC: 15 U/L (ref 15–37)
BASOPHILS # BLD: 0 K/UL (ref 0–0.2)
BASOPHILS NFR BLD: 0 % (ref 0–2)
BILIRUB SERPL-MCNC: 0.6 MG/DL (ref 0.2–1.1)
BUN SERPL-MCNC: 12 MG/DL (ref 8–23)
BUN SERPL-MCNC: 12 MG/DL (ref 8–23)
CALCIUM SERPL-MCNC: 8.9 MG/DL (ref 8.3–10.4)
CALCIUM SERPL-MCNC: 9.5 MG/DL (ref 8.3–10.4)
CHLORIDE SERPL-SCNC: 100 MMOL/L (ref 98–107)
CHLORIDE SERPL-SCNC: 101 MMOL/L (ref 98–107)
CO2 SERPL-SCNC: 24 MMOL/L (ref 21–32)
CO2 SERPL-SCNC: 28 MMOL/L (ref 21–32)
CREAT SERPL-MCNC: 0.66 MG/DL (ref 0.6–1)
CREAT SERPL-MCNC: 0.69 MG/DL (ref 0.6–1)
DIFFERENTIAL METHOD BLD: ABNORMAL
EOSINOPHIL # BLD: 0 K/UL (ref 0–0.8)
EOSINOPHIL NFR BLD: 0 % (ref 0.5–7.8)
ERYTHROCYTE [DISTWIDTH] IN BLOOD BY AUTOMATED COUNT: 12.8 % (ref 11.9–14.6)
ERYTHROCYTE [DISTWIDTH] IN BLOOD BY AUTOMATED COUNT: 12.9 % (ref 11.9–14.6)
GLOBULIN SER CALC-MCNC: 4 G/DL (ref 2.3–3.5)
GLUCOSE SERPL-MCNC: 126 MG/DL (ref 65–100)
GLUCOSE SERPL-MCNC: 92 MG/DL (ref 65–100)
HCT VFR BLD AUTO: 41.5 % (ref 35.8–46.3)
HCT VFR BLD AUTO: 46.3 % (ref 35.8–46.3)
HGB BLD-MCNC: 13.7 G/DL (ref 11.7–15.4)
HGB BLD-MCNC: 15.2 G/DL (ref 11.7–15.4)
IMM GRANULOCYTES # BLD AUTO: 0.1 K/UL (ref 0–0.5)
IMM GRANULOCYTES NFR BLD AUTO: 1 % (ref 0–5)
LYMPHOCYTES # BLD: 1.1 K/UL (ref 0.5–4.6)
LYMPHOCYTES NFR BLD: 9 % (ref 13–44)
MCH RBC QN AUTO: 31.6 PG (ref 26.1–32.9)
MCH RBC QN AUTO: 31.7 PG (ref 26.1–32.9)
MCHC RBC AUTO-ENTMCNC: 32.8 G/DL (ref 31.4–35)
MCHC RBC AUTO-ENTMCNC: 33 G/DL (ref 31.4–35)
MCV RBC AUTO: 95.6 FL (ref 79.6–97.8)
MCV RBC AUTO: 96.7 FL (ref 79.6–97.8)
MONOCYTES # BLD: 1 K/UL (ref 0.1–1.3)
MONOCYTES NFR BLD: 8 % (ref 4–12)
NEUTS SEG # BLD: 10.7 K/UL (ref 1.7–8.2)
NEUTS SEG NFR BLD: 83 % (ref 43–78)
NRBC # BLD: 0 K/UL (ref 0–0.2)
NRBC # BLD: 0 K/UL (ref 0–0.2)
PLATELET # BLD AUTO: 311 K/UL (ref 150–450)
PLATELET # BLD AUTO: 314 K/UL (ref 150–450)
PMV BLD AUTO: 10.3 FL (ref 9.4–12.3)
PMV BLD AUTO: 10.4 FL (ref 9.4–12.3)
POTASSIUM SERPL-SCNC: 3.6 MMOL/L (ref 3.5–5.1)
POTASSIUM SERPL-SCNC: 3.9 MMOL/L (ref 3.5–5.1)
PROT SERPL-MCNC: 7.1 G/DL (ref 6.3–8.2)
RBC # BLD AUTO: 4.34 M/UL (ref 4.05–5.2)
RBC # BLD AUTO: 4.79 M/UL (ref 4.05–5.2)
SODIUM SERPL-SCNC: 140 MMOL/L (ref 136–145)
SODIUM SERPL-SCNC: 140 MMOL/L (ref 136–145)
WBC # BLD AUTO: 12.9 K/UL (ref 4.3–11.1)
WBC # BLD AUTO: 8.3 K/UL (ref 4.3–11.1)

## 2020-06-30 PROCEDURE — 74011250636 HC RX REV CODE- 250/636: Performed by: INTERNAL MEDICINE

## 2020-06-30 PROCEDURE — 74250 X-RAY XM SM INT 1CNTRST STD: CPT

## 2020-06-30 PROCEDURE — 74011250637 HC RX REV CODE- 250/637: Performed by: INTERNAL MEDICINE

## 2020-06-30 PROCEDURE — 65270000029 HC RM PRIVATE

## 2020-06-30 PROCEDURE — 74011636320 HC RX REV CODE- 636/320: Performed by: INTERNAL MEDICINE

## 2020-06-30 PROCEDURE — 94640 AIRWAY INHALATION TREATMENT: CPT

## 2020-06-30 PROCEDURE — 36415 COLL VENOUS BLD VENIPUNCTURE: CPT

## 2020-06-30 PROCEDURE — 85025 COMPLETE CBC W/AUTO DIFF WBC: CPT

## 2020-06-30 PROCEDURE — 85027 COMPLETE CBC AUTOMATED: CPT

## 2020-06-30 PROCEDURE — 80053 COMPREHEN METABOLIC PANEL: CPT

## 2020-06-30 RX ORDER — HYDROMORPHONE HYDROCHLORIDE 1 MG/ML
0.5 INJECTION, SOLUTION INTRAMUSCULAR; INTRAVENOUS; SUBCUTANEOUS ONCE
Status: COMPLETED | OUTPATIENT
Start: 2020-06-30 | End: 2020-06-30

## 2020-06-30 RX ADMIN — Medication 10 ML: at 05:08

## 2020-06-30 RX ADMIN — HYDRALAZINE HYDROCHLORIDE 20 MG: 20 INJECTION, SOLUTION INTRAMUSCULAR; INTRAVENOUS at 21:47

## 2020-06-30 RX ADMIN — MORPHINE SULFATE 5 MG: 10 INJECTION, SOLUTION INTRAMUSCULAR; INTRAVENOUS at 18:09

## 2020-06-30 RX ADMIN — DIATRIZOATE MEGLUMINE AND DIATRIZOATE SODIUM 120 ML: 660; 100 LIQUID ORAL; RECTAL at 15:59

## 2020-06-30 RX ADMIN — Medication 5 ML: at 21:48

## 2020-06-30 RX ADMIN — MORPHINE SULFATE 5 MG: 10 INJECTION, SOLUTION INTRAMUSCULAR; INTRAVENOUS at 01:29

## 2020-06-30 RX ADMIN — HEPARIN SODIUM 5000 UNITS: 5000 INJECTION INTRAVENOUS; SUBCUTANEOUS at 05:08

## 2020-06-30 RX ADMIN — HYDROMORPHONE HYDROCHLORIDE 0.5 MG: 1 INJECTION, SOLUTION INTRAMUSCULAR; INTRAVENOUS; SUBCUTANEOUS at 10:41

## 2020-06-30 RX ADMIN — BUDESONIDE AND FORMOTEROL FUMARATE DIHYDRATE 2 PUFF: 160; 4.5 AEROSOL RESPIRATORY (INHALATION) at 07:41

## 2020-06-30 RX ADMIN — MORPHINE SULFATE 5 MG: 10 INJECTION, SOLUTION INTRAMUSCULAR; INTRAVENOUS at 22:00

## 2020-06-30 RX ADMIN — HEPARIN SODIUM 5000 UNITS: 5000 INJECTION INTRAVENOUS; SUBCUTANEOUS at 21:47

## 2020-06-30 RX ADMIN — BUDESONIDE AND FORMOTEROL FUMARATE DIHYDRATE 2 PUFF: 160; 4.5 AEROSOL RESPIRATORY (INHALATION) at 19:35

## 2020-06-30 RX ADMIN — MORPHINE SULFATE 5 MG: 10 INJECTION, SOLUTION INTRAMUSCULAR; INTRAVENOUS at 05:32

## 2020-06-30 NOTE — PROGRESS NOTES
Progress Note    Patient: Pelon Jernigan MRN: [id number]  SSN: [id number]    YOB: 1960  Age: 62 y.o. Sex: female      Admit Date: 6/27/2020    LOS: 3 days     Subjective:     Seen and examined, no BM yet, scheduled for gastrografin studies, as per surgery    Objective:     Vitals:    06/30/20 0342 06/30/20 0734 06/30/20 0741 06/30/20 1716   BP: 155/76 175/84  (!) 173/96   Pulse: 71 67  99   Resp: 19 20  15   Temp: 98.1 °F (36.7 °C) 97.8 °F (36.6 °C)  97.4 °F (36.3 °C)   SpO2: 94% 95% 94% 93%   Weight:       Height:            Intake and Output:  Current Shift: No intake/output data recorded. Last three shifts: 06/28 1901 - 06/30 0700  In: -   Out: 1000     Physical Exam:   GENERAL: alert, cooperative, mild distress, appears stated age  LUNG: clear to auscultation bilaterally  HEART: regular rate and rhythm, S1, S2 normal, no murmur, click, rub or gallop  ABDOMEN: distended, mild tenderness  EXTREMITIES:  extremities normal, atraumatic, no cyanosis or edema    Lab/Data Review: All lab results for the last 24 hours reviewed.        Assessment:     Principal Problem:    Small bowel obstruction ([address] (6/27/2020)    Active Problems:    Obesity, morbid ([address] (12/27/2017)      Essential hypertension (12/27/2017)      Type 2 diabetes mellitus with diabetic neuropathy ([address] (9/24/2018)      FORREST (obstructive sleep apnea) (6/27/2020)      Chronic bilateral low back pain (6/27/2020)      Hypokalemia (6/28/2020)        Plan:     SBO  Partial  Continue NPO  Pain medication, surgery following  Repeat KUB in AM    Diabetes mellitus type  Continue sliding scale    Obstructive sleep apnea  Resume CPAP as per home    Hypokalemia  Resolved continue to monitor BMP      Signed By: Neville Olvera MD     June 30, 2020

## 2020-06-30 NOTE — PROGRESS NOTES
Problem: Falls - Risk of  Goal: *Absence of Falls  Description: Document Harish Vinicio Fall Risk and appropriate interventions in the flowsheet.   Outcome: Progressing Towards Goal  Note: Fall Risk Interventions:  Mobility Interventions: Bed/chair exit alarm, Communicate number of staff needed for ambulation/transfer, Patient to call before getting OOB    Mentation Interventions: Bed/chair exit alarm, Door open when patient unattended, Increase mobility    Medication Interventions: Bed/chair exit alarm, Patient to call before getting OOB, Teach patient to arise slowly    Elimination Interventions: Bed/chair exit alarm, Call light in reach, Patient to call for help with toileting needs, Toilet paper/wipes in reach, Toileting schedule/hourly rounds              Problem: Patient Education: Go to Patient Education Activity  Goal: Patient/Family Education  Outcome: Progressing Towards Goal     Problem: General Medical Care Plan  Goal: *Vital signs within specified parameters  Outcome: Progressing Towards Goal  Goal: *Labs within defined limits  Outcome: Progressing Towards Goal  Goal: *Absence of infection signs and symptoms  Outcome: Progressing Towards Goal  Goal: *Optimal pain control at patient's stated goal  Outcome: Progressing Towards Goal  Goal: *Skin integrity maintained  Outcome: Progressing Towards Goal  Goal: *Fluid volume balance  Outcome: Progressing Towards Goal  Goal: *Optimize nutritional status  Outcome: Progressing Towards Goal  Goal: *Anxiety reduced or absent  Outcome: Progressing Towards Goal  Goal: *Progressive mobility and function (eg: ADL's)  Outcome: Progressing Towards Goal     Problem: Patient Education: Go to Patient Education Activity  Goal: Patient/Family Education  Outcome: Progressing Towards Goal     Problem: Gas Exchange - Impaired  Goal: *Absence of hypoxia  Outcome: Progressing Towards Goal     Problem: Pressure Injury - Risk of  Goal: *Prevention of pressure injury  Description: Document Randal Scale and appropriate interventions in the flowsheet. Outcome: Progressing Towards Goal  Note: Pressure Injury Interventions:  Sensory Interventions: Assess changes in LOC, Avoid rigorous massage over bony prominences    Moisture Interventions: Absorbent underpads, Apply protective barrier, creams and emollients, Check for incontinence Q2 hours and as needed, Limit adult briefs, Maintain skin hydration (lotion/cream), Minimize layers, Moisture barrier, Offer toileting Q_hr    Activity Interventions: Increase time out of bed, Pressure redistribution bed/mattress(bed type), PT/OT evaluation    Mobility Interventions: HOB 30 degrees or less, Pressure redistribution bed/mattress (bed type), PT/OT evaluation, Turn and reposition approx.  every two hours(pillow and wedges)    Nutrition Interventions: Document food/fluid/supplement intake    Friction and Shear Interventions: Apply protective barrier, creams and emollients, Foam dressings/transparent film/skin sealants, Lift sheet, Minimize layers                Problem: Patient Education: Go to Patient Education Activity  Goal: Patient/Family Education  Outcome: Progressing Towards Goal

## 2020-06-30 NOTE — PROGRESS NOTES
Called and asked to come to XRAY to look at a patient. When I arrived, was told that patient had been A&OX4, but was now not responding. Pt appeared obtunded. As I was attempting to assess/ arouse patient, a Rapid Response was called. Pt was turned/tilted to her right side and appeared apneic due to her size. It took about 20 seconds of aggressive sternal rubbing, but pt awoke with a confused startle. Pt was able to tell me her, name,  and where she was. Pt stated that she did not sleep at all last night and was sleeping soundly. A set of VS obtained: PE=611/87, RR=18, P=94, and O2 sat=95% on RA. Pt now awake and interacting with everyone. Rapid Response canceled.

## 2020-06-30 NOTE — PROGRESS NOTES
Problem: Falls - Risk of  Goal: *Absence of Falls  Description: Document Lillian Clarke Fall Risk and appropriate interventions in the flowsheet.   Outcome: Progressing Towards Goal  Note: Fall Risk Interventions:  Mobility Interventions: Bed/chair exit alarm, Communicate number of staff needed for ambulation/transfer, Patient to call before getting OOB    Mentation Interventions: Bed/chair exit alarm, Door open when patient unattended, Increase mobility    Medication Interventions: Bed/chair exit alarm, Patient to call before getting OOB, Teach patient to arise slowly    Elimination Interventions: Bed/chair exit alarm, Call light in reach, Patient to call for help with toileting needs, Stay With Me (per policy), Toilet paper/wipes in reach, Toileting schedule/hourly rounds              Problem: Patient Education: Go to Patient Education Activity  Goal: Patient/Family Education  Outcome: Progressing Towards Goal     Problem: General Medical Care Plan  Goal: *Vital signs within specified parameters  Outcome: Progressing Towards Goal  Goal: *Labs within defined limits  Outcome: Progressing Towards Goal  Goal: *Absence of infection signs and symptoms  Outcome: Progressing Towards Goal  Goal: *Optimal pain control at patient's stated goal  Outcome: Progressing Towards Goal  Goal: *Skin integrity maintained  Outcome: Progressing Towards Goal  Goal: *Fluid volume balance  Outcome: Progressing Towards Goal  Goal: *Optimize nutritional status  Outcome: Progressing Towards Goal  Goal: *Anxiety reduced or absent  Outcome: Progressing Towards Goal  Goal: *Progressive mobility and function (eg: ADL's)  Outcome: Progressing Towards Goal     Problem: Patient Education: Go to Patient Education Activity  Goal: Patient/Family Education  Outcome: Progressing Towards Goal     Problem: Gas Exchange - Impaired  Goal: *Absence of hypoxia  Outcome: Progressing Towards Goal     Problem: Pressure Injury - Risk of  Goal: *Prevention of pressure injury  Description: Document Randal Scale and appropriate interventions in the flowsheet.   Outcome: Progressing Towards Goal  Note: Pressure Injury Interventions:  Sensory Interventions: Assess changes in LOC, Avoid rigorous massage over bony prominences    Moisture Interventions: Absorbent underpads, Check for incontinence Q2 hours and as needed    Activity Interventions: Increase time out of bed, Pressure redistribution bed/mattress(bed type), PT/OT evaluation    Mobility Interventions: HOB 30 degrees or less, Pressure redistribution bed/mattress (bed type), PT/OT evaluation    Nutrition Interventions: Offer support with meals,snacks and hydration, Document food/fluid/supplement intake    Friction and Shear Interventions: HOB 30 degrees or less                Problem: Patient Education: Go to Patient Education Activity  Goal: Patient/Family Education  Outcome: Progressing Towards Goal     Problem: Patient Education: Go to Patient Education Activity  Goal: Patient/Family Education  Outcome: Progressing Towards Goal

## 2020-07-01 ENCOUNTER — APPOINTMENT (OUTPATIENT)
Dept: GENERAL RADIOLOGY | Age: 60
DRG: 394 | End: 2020-07-01
Attending: INTERNAL MEDICINE
Payer: MEDICARE

## 2020-07-01 LAB
ANION GAP SERPL CALC-SCNC: 15 MMOL/L (ref 7–16)
BASOPHILS # BLD: 0.1 K/UL (ref 0–0.2)
BASOPHILS NFR BLD: 0 % (ref 0–2)
BUN SERPL-MCNC: 14 MG/DL (ref 8–23)
CALCIUM SERPL-MCNC: 9.4 MG/DL (ref 8.3–10.4)
CHLORIDE SERPL-SCNC: 103 MMOL/L (ref 98–107)
CO2 SERPL-SCNC: 21 MMOL/L (ref 21–32)
CREAT SERPL-MCNC: 0.69 MG/DL (ref 0.6–1)
DIFFERENTIAL METHOD BLD: ABNORMAL
EOSINOPHIL # BLD: 0 K/UL (ref 0–0.8)
EOSINOPHIL NFR BLD: 0 % (ref 0.5–7.8)
ERYTHROCYTE [DISTWIDTH] IN BLOOD BY AUTOMATED COUNT: 13 % (ref 11.9–14.6)
GLUCOSE SERPL-MCNC: 105 MG/DL (ref 65–100)
HCT VFR BLD AUTO: 48.4 % (ref 35.8–46.3)
HGB BLD-MCNC: 14.8 G/DL (ref 11.7–15.4)
IMM GRANULOCYTES # BLD AUTO: 0.1 K/UL (ref 0–0.5)
IMM GRANULOCYTES NFR BLD AUTO: 1 % (ref 0–5)
LYMPHOCYTES # BLD: 1.8 K/UL (ref 0.5–4.6)
LYMPHOCYTES NFR BLD: 16 % (ref 13–44)
MCH RBC QN AUTO: 30.7 PG (ref 26.1–32.9)
MCHC RBC AUTO-ENTMCNC: 30.6 G/DL (ref 31.4–35)
MCV RBC AUTO: 100.4 FL (ref 79.6–97.8)
MONOCYTES # BLD: 1.1 K/UL (ref 0.1–1.3)
MONOCYTES NFR BLD: 10 % (ref 4–12)
NEUTS SEG # BLD: 8.3 K/UL (ref 1.7–8.2)
NEUTS SEG NFR BLD: 73 % (ref 43–78)
NRBC # BLD: 0 K/UL (ref 0–0.2)
PLATELET # BLD AUTO: 254 K/UL (ref 150–450)
PMV BLD AUTO: 11.1 FL (ref 9.4–12.3)
POTASSIUM SERPL-SCNC: 4.5 MMOL/L (ref 3.5–5.1)
RBC # BLD AUTO: 4.82 M/UL (ref 4.05–5.2)
SODIUM SERPL-SCNC: 139 MMOL/L (ref 136–145)
WBC # BLD AUTO: 11.3 K/UL (ref 4.3–11.1)

## 2020-07-01 PROCEDURE — 74018 RADEX ABDOMEN 1 VIEW: CPT

## 2020-07-01 PROCEDURE — 94640 AIRWAY INHALATION TREATMENT: CPT

## 2020-07-01 PROCEDURE — 65270000029 HC RM PRIVATE

## 2020-07-01 PROCEDURE — 85025 COMPLETE CBC W/AUTO DIFF WBC: CPT

## 2020-07-01 PROCEDURE — 97530 THERAPEUTIC ACTIVITIES: CPT

## 2020-07-01 PROCEDURE — 36415 COLL VENOUS BLD VENIPUNCTURE: CPT

## 2020-07-01 PROCEDURE — 80048 BASIC METABOLIC PNL TOTAL CA: CPT

## 2020-07-01 PROCEDURE — 74011250636 HC RX REV CODE- 250/636: Performed by: INTERNAL MEDICINE

## 2020-07-01 PROCEDURE — 94760 N-INVAS EAR/PLS OXIMETRY 1: CPT

## 2020-07-01 PROCEDURE — 74011250637 HC RX REV CODE- 250/637: Performed by: INTERNAL MEDICINE

## 2020-07-01 RX ADMIN — Medication 5 ML: at 05:43

## 2020-07-01 RX ADMIN — MORPHINE SULFATE 5 MG: 10 INJECTION, SOLUTION INTRAMUSCULAR; INTRAVENOUS at 23:59

## 2020-07-01 RX ADMIN — MORPHINE SULFATE 5 MG: 10 INJECTION, SOLUTION INTRAMUSCULAR; INTRAVENOUS at 06:32

## 2020-07-01 RX ADMIN — HEPARIN SODIUM 5000 UNITS: 5000 INJECTION INTRAVENOUS; SUBCUTANEOUS at 21:26

## 2020-07-01 RX ADMIN — BUDESONIDE AND FORMOTEROL FUMARATE DIHYDRATE 2 PUFF: 160; 4.5 AEROSOL RESPIRATORY (INHALATION) at 07:37

## 2020-07-01 RX ADMIN — MORPHINE SULFATE 5 MG: 10 INJECTION, SOLUTION INTRAMUSCULAR; INTRAVENOUS at 10:55

## 2020-07-01 RX ADMIN — HEPARIN SODIUM 5000 UNITS: 5000 INJECTION INTRAVENOUS; SUBCUTANEOUS at 05:43

## 2020-07-01 RX ADMIN — MORPHINE SULFATE 5 MG: 10 INJECTION, SOLUTION INTRAMUSCULAR; INTRAVENOUS at 02:24

## 2020-07-01 RX ADMIN — Medication 10 ML: at 21:27

## 2020-07-01 RX ADMIN — HEPARIN SODIUM 5000 UNITS: 5000 INJECTION INTRAVENOUS; SUBCUTANEOUS at 14:00

## 2020-07-01 RX ADMIN — BUDESONIDE AND FORMOTEROL FUMARATE DIHYDRATE 2 PUFF: 160; 4.5 AEROSOL RESPIRATORY (INHALATION) at 19:43

## 2020-07-01 RX ADMIN — Medication 5 ML: at 13:59

## 2020-07-01 RX ADMIN — MORPHINE SULFATE 5 MG: 10 INJECTION, SOLUTION INTRAMUSCULAR; INTRAVENOUS at 17:54

## 2020-07-01 NOTE — PROGRESS NOTES
Problem: Mobility Impaired (Adult and Pediatric)  Goal: *Acute Goals and Plan of Care (Insert Text)  Description: STG:  (1.)Ms. Dixie Mooney will move from supine to sit and sit to supine , scoot up and down and roll side to side with MODIFIED INDEPENDENCE within 3 treatment day(s). (2.)Ms. Dixie Mooney will transfer from bed to chair and chair to bed with SUPERVISION using the least restrictive device within 3 treatment day(s). (3.)Ms. Dixie Mooney will ambulate with STAND BY ASSIST for 75 feet with the least restrictive device within 3 treatment day(s). (4.)Ms. Dixie Mooney will perform standing static and dynamic balance activities x 15 minutes with STAND BY ASSIST to improve safety within 3 day(s). (5.)Ms. Dixie Mooney will maintain stable vital signs throughout all functional mobility within 3 days. LTG:  (1.)Ms. Dixie Mooney will move from supine to sit and sit to supine , scoot up and down and roll side to side in bed with INDEPENDENT within 7 treatment day(s). (2.)Ms. Dixie Mooney will transfer from bed to chair and chair to bed with MODIFIED INDEPENDENCE using the least restrictive device within 7 treatment day(s). (3.)Ms. Dixie Mooney will ambulate with MODIFIED INDEPENDENCE for 125 feet with the least restrictive device within 7 treatment day(s). (4.)Ms. Dixie Mooney will perform standing static and dynamic balance activities x 15 minutes with MODIFIED INDEPENDENCE to improve safety within 7 day(s).   ________________________________________________________________________________________________     Outcome: Progressing Towards Goal     PHYSICAL THERAPY: Daily Note and PM 7/1/2020  INPATIENT: PT Visit Days : 2  [place] / [place] B / Product Type: Medicare /       NAME/AGE/GENDER: La Mcleod is a 62 y.o. female   PRIMARY DIAGNOSIS: Small bowel obstruction ([address] [K56.609] Small bowel obstruction ([facility] [address] Small bowel obstruction ([facility] [address]       ICD-10: Treatment Diagnosis:    · Generalized Muscle Weakness (M62.81)  · Difficulty in walking, Not elsewhere classified (R26.2)  · Repeated Falls (R29.6)   Precaution/Allergies:  Codeine and Sulfa (sulfonamide antibiotics)      ASSESSMENT:     Ms. Keith Villalta is a 62 y.o. female admitted for small bowel obstruction. She lives with sister in law in a 2 story home with all needs on the main level. She typically ambulates with a straight cane, admits to 3 falls in the past 6 months. She reports lately she has been scooting around in an office chair and that she is working towards getting an electric wheelchair, which she would significantly benefit from due to poor activity tolerance. Patient was supine upon contact and agreeable to PT. Patient performs supine to sit with mod I and transfers to standing with SBA-supervision. Once standing patient ambulates 10' x 2 (to and from bathroom) without use of assistive device. Patient performs toilet transfer with supervision and demonstrates good static standing balance during standing ADL activity. Patient needed a seated rest break in between activity. Patient then ambulates an additional 61' with rolling walker, CGA, and cues for sequencing with rolling walker. Patient was quite fatigued post ambulation needing a seated rest break. Overall steady progress towards physical therapy goals. Patient's goals listed above are still appropriate. Will continue skilled PT to address remaining deficits. This section established at most recent assessment   PROBLEM LIST (Impairments causing functional limitations):  1. Decreased Strength  2. Decreased ADL/Functional Activities  3. Decreased Transfer Abilities  4. Decreased Ambulation Ability/Technique  5. Decreased Balance  6. Decreased Activity Tolerance  7. Decreased Pacing Skills  8. Increased Shortness of Breath  9. Decreased Knowledge of Precautions  10.  Decreased [place] with Home Exercise Program   INTERVENTIONS PLANNED: (Benefits and precautions of physical therapy have been discussed with the patient.)  1. Balance Exercise  2. Bed Mobility  3. Family Education  4. Gait Training  5. Home Exercise Program (HEP)  6. Neuromuscular Re-education/Strengthening  7. Therapeutic Activites  8. Therapeutic Exercise/Strengthening  9. Transfer Training     TREATMENT PLAN: Frequency/Duration: 3 times a week for duration of hospital stay  Rehabilitation Potential For Stated Goals: Good     REHAB RECOMMENDATIONS (at time of discharge pending progress):    Placement: It is my opinion, based on this patient's performance to date, that Ms. Kwaku Meza may benefit from OUTPATIENT THERAPY after discharge due to the functional deficits listed above that are likely to improve with skilled rehabilitation because because he/she will benefit from the individualized approach tailored to his/her deficits. Equipment:    None at this time              HISTORY:   History of Present Injury/Illness (Reason for Referral): Crow Monroy is a 62 y.o. female with past medical history significant for morbid obesity, hypertension, anxiety, sleep apnea, hypothyroidism presents as a direct transfer from urgent care center after being found with a small bowel obstruction. Patient reports symptoms for started 12 days ago. At that time, patient states symptoms are mild. He did not improve so she went to an urgent care center on Tuesday. Imaging done there showed a small bowel obstruction. Patient was admitted to another facility overnight for management of small bowel obstruction. She did not have NG tube placed at that time and she had a large bowel movement in the morning and was able to tolerate liquid diet. She was discharged following day and went home. However, after discharge she noted that her stomach was becoming more uncomfortable. Symptoms continue to worsen and she decided to go to urgent care this morning. She had reimaging done by CT scan which showed a small bowel obstruction.   Patient was then transferred to [facility] for further evaluation. Patient denies any fevers, chills, shortness of breath, chest pain. She has had 10 episodes of vomiting since this morning. She reports no worsening leg swelling or bloody bowel movements. 10 systems reviewed and negative except as noted in HPI. Past Medical History/Comorbidities:   Ms. Justus Ruggiero  has a past medical history of Acquired hypothyroidism (12/27/2017), Anxiety, Arthritis, Depression, Fibromyalgia, Hypertension (1977), Obesity, morbid ([address] Psychiatric disorder (2004), Psychiatric disorder (2004), Sleep apnea, Type 2 diabetes mellitus without complication, without long-term current use of insulin ([address] (12/27/2017), and Unspecified adverse effect of anesthesia. She also has no past medical history of Arrhythmia, Asthma, Autoimmune disease ([address] CAD (coronary artery disease), Cancer ([address] Chronic kidney disease, Chronic pain, Coagulation defects, COPD, Difficult intubation, GERD (gastroesophageal reflux disease), Heart failure ([address] Liver disease, Malignant hyperthermia due to anesthesia, Nausea & vomiting, Other ill-defined conditions(799.89), Pseudocholinesterase deficiency, PUD (peptic ulcer disease), Seizures ([address] or Thromboembolus ([address] Ms. Justus Ruggiero  has a past surgical history that includes pr uns oral surg proc by report (2000); hx appendectomy (1973); hx cholecystectomy (1990); hx gastric bypass (2002); hx orthopaedic (1975); hx orthopaedic (July 2009); hx dilation and curettage; and hx gyn.   Social History/Living Environment:   Home Environment: Private residence  Wheelchair Ramp: Yes  One/Two Story Residence: Two story, live on 1st floor  Living Alone: No  Support Systems: Family member(s)  Patient Expects to be Discharged to[de-identified] Private residence  Current DME Used/Available at Home: [employer], [address] [employer] chair, Cane, straight  Tub or Shower Type: Shower  Prior Level of Function/Work/Activity:  She lives with sister in law in a 2 story home with all needs on the main level. She typically ambulates with a straight cane, admits to 3 falls in the past 6 months. She reports lately she has been scooting around in an office chair and that she is working towards getting an electric wheelchair, which she would significantly benefit from due to poor activity tolerance. Number of Personal Factors/Comorbidities that affect the Plan of Care: 3+: HIGH COMPLEXITY   EXAMINATION:   Most Recent Physical Functioning:   Gross Assessment:                  Posture:     Balance:  Sitting: Intact  Standing: Impaired  Standing - Static: Good  Standing - Dynamic : Fair Bed Mobility:  Supine to Sit: Supervision;Modified independent  Wheelchair Mobility:     Transfers:  Sit to Stand: Supervision;Stand-by assistance  Stand to Sit: Supervision;Stand-by assistance  Gait:     Base of Support: Center of gravity altered; Widened  Speed/Bree: Slow;Shuffled  Step Length: Left shortened;Right shortened  Gait Abnormalities: Decreased step clearance;Trunk sway increased; Shuffling gait; Path deviations  Distance (ft): (10' x 2, 60')  Assistive Device: Walker, rolling  Ambulation - Level of Assistance: Contact guard assistance;Stand-by assistance  Interventions: Safety awareness training; Tactile cues; Verbal cues      Body Structures Involved:  1. Nerves  2. Heart  3. Lungs  4. Digestive Structures  5. Muscles Body Functions Affected:  1. Sensory/Pain  2. Cardio  3. Neuromusculoskeletal  4. Movement Related  5. Digestive Activities and Participation Affected:  1. Mobility  2. Self Care  3. Domestic Life  4. Interpersonal Interactions and Relationships  5.  Community, Social and [place]   Number of elements that affect the Plan of Care: 4+: HIGH COMPLEXITY   CLINICAL PRESENTATION:   Presentation: Stable and uncomplicated: LOW COMPLEXITY   CLINICAL DECISION MAKIN [facility] Inpatient Short Form  How much difficulty does the patient currently have. .. Unable A Lot A Little None   1. Turning over in bed (including adjusting bedclothes, sheets and blankets)? [] 1   [] 2   [] 3   [x] 4   2. Sitting down on and standing up from a chair with arms ( e.g., wheelchair, bedside commode, etc.)   [] 1   [] 2   [x] 3   [] 4   3. Moving from lying on back to sitting on the side of the bed? [] 1   [] 2   [] 3   [x] 4   How much help from another person does the patient currently need. .. Total A Lot A Little None   4. Moving to and from a bed to a chair (including a wheelchair)? [] 1   [] 2   [x] 3   [] 4   5. Need to walk in hospital room? [] 1   [] 2   [x] 3   [] 4   6. Climbing 3-5 steps with a railing? [] 1   [x] 2   [] 3   [] 4   © 2007, Trustees of [address] [place] [address] [address] [address], under license to [employer]. All rights reserved      Score:  Initial: 19 Most Recent: X (Date: -- )    Interpretation of Tool:  Represents activities that are increasingly more difficult (i.e. Bed mobility, Transfers, Gait). Medical Necessity:     · Patient demonstrates   · good  ·  rehab potential due to higher previous functional level. Reason for Services/Other Comments:  · Patient   · continues to require modification of therapeutic interventions to increase complexity of exercises  · . Use of outcome tool(s) and clinical judgement create a POC that gives a: Clear prediction of patient's progress: LOW COMPLEXITY            TREATMENT:   (In addition to Assessment/Re-Assessment sessions the following treatments were rendered)   Pre-treatment Symptoms/Complaints:  none  Pain: Initial:   Pain Intensity 1: 0  Post Session:  0     Therapeutic Activity: (    23 minutes): Therapeutic activities including bed mobility training, transfer training from various surface heights, ambulation on level ground, static/dynamic standing balance, posture training, instruction in sequencing with rolling walker, and patient education to improve mobility, strength, and balance. Required minimal Safety awareness training; Tactile cues; Verbal cues to promote static and dynamic balance in standing and promote coordination of bilateral, lower extremity(s). Braces/Orthotics/Lines/Etc:   · nasogastric tube  Treatment/Session Assessment:    · Response to Treatment:  See above  · Interdisciplinary Collaboration:   o Physical Therapy Assistant  o Registered Nurse  · After treatment position/precautions:   o Up in chair  o Bed/Chair-wheels locked  o Call light within reach  o RN notified   · Compliance with Program/Exercises: Will assess as treatment progresses  · Recommendations/Intent for next treatment session: \"Next visit will focus on advancements to more challenging activities and reduction in assistance provided\".   Total Treatment Duration:  PT Patient Time In/Time Out  Time In: 1332  Time Out: [address], PTA

## 2020-07-01 NOTE — PROGRESS NOTES
Problem: Falls - Risk of  Goal: *Absence of Falls  Description: Document Esther Epps Fall Risk and appropriate interventions in the flowsheet.   7/1/2020 0502 by Lc Tucker RN  Outcome: Progressing Towards Goal  Note: Fall Risk Interventions:  Mobility Interventions: Bed/chair exit alarm, Communicate number of staff needed for ambulation/transfer, OT consult for ADLs, Patient to call before getting OOB, PT Consult for mobility concerns    Mentation Interventions: Bed/chair exit alarm, Door open when patient unattended, Increase mobility    Medication Interventions: Bed/chair exit alarm, Patient to call before getting OOB, Teach patient to arise slowly    Elimination Interventions: Call light in reach, Patient to call for help with toileting needs, Stay With Me (per policy), Toilet paper/wipes in reach, Toileting schedule/hourly rounds           7/1/2020 0500 by Lc Tucker RN  Outcome: Progressing Towards Goal  Note: Fall Risk Interventions:  Mobility Interventions: Bed/chair exit alarm, Communicate number of staff needed for ambulation/transfer, OT consult for ADLs, Patient to call before getting OOB, PT Consult for mobility concerns    Mentation Interventions: Bed/chair exit alarm, Door open when patient unattended, Increase mobility    Medication Interventions: Bed/chair exit alarm, Patient to call before getting OOB, Teach patient to arise slowly    Elimination Interventions: Call light in reach, Patient to call for help with toileting needs, Stay With Me (per policy), Toilet paper/wipes in reach, Toileting schedule/hourly rounds              Problem: Patient Education: Go to Patient Education Activity  Goal: Patient/Family Education  7/1/2020 0502 by Lc Tucker RN  Outcome: Progressing Towards Goal  7/1/2020 0500 by Lc Tucker RN  Outcome: Progressing Towards Goal     Problem: General Medical Care Plan  Goal: *Vital signs within specified parameters  7/1/2020 0502 by Ester Gill Riri Pal, RN  Outcome: Progressing Towards Goal  7/1/2020 0500 by Penny Fast, RN  Outcome: Progressing Towards Goal  Goal: *Labs within defined limits  7/1/2020 0502 by Penny Fast, RN  Outcome: Progressing Towards Goal  7/1/2020 0500 by Penny Fast, RN  Outcome: Progressing Towards Goal  Goal: *Absence of infection signs and symptoms  7/1/2020 0502 by Penny Fast, RN  Outcome: Progressing Towards Goal  7/1/2020 0500 by Penny Fast, RN  Outcome: Progressing Towards Goal  Goal: *Optimal pain control at patient's stated goal  7/1/2020 0502 by Penyn Fast, RN  Outcome: Progressing Towards Goal  7/1/2020 0500 by Penny Fast, RN  Outcome: Progressing Towards Goal  Goal: *Skin integrity maintained  7/1/2020 0502 by Penny Fast, RN  Outcome: Progressing Towards Goal  7/1/2020 0500 by Penny Fast, RN  Outcome: Progressing Towards Goal  Goal: *Fluid volume balance  7/1/2020 0502 by Penny Fast, RN  Outcome: Progressing Towards Goal  7/1/2020 0500 by Penny Fast, RN  Outcome: Progressing Towards Goal  Goal: *Optimize nutritional status  7/1/2020 0502 by Penny Fast, RN  Outcome: Progressing Towards Goal  7/1/2020 0500 by Penny Fast, RN  Outcome: Progressing Towards Goal  Goal: *Anxiety reduced or absent  7/1/2020 0502 by Penny Fast, RN  Outcome: Progressing Towards Goal  7/1/2020 0500 by Penny Fast, RN  Outcome: Progressing Towards Goal  Goal: *Progressive mobility and function (eg: ADL's)  7/1/2020 0502 by Penny Fast, RN  Outcome: Progressing Towards Goal  7/1/2020 0500 by Penny Fast, RN  Outcome: Progressing Towards Goal     Problem: Patient Education: Go to Patient Education Activity  Goal: Patient/Family Education  7/1/2020 0502 by Penny Fast, RN  Outcome: Progressing Towards Goal  7/1/2020 0500 by Penny Fast, RN  Outcome: Progressing Towards Goal     Problem: Gas Exchange - Impaired  Goal: *Absence of hypoxia  7/1/2020 0502 by Harriet Bacon RN  Outcome: Progressing Towards Goal  7/1/2020 0500 by Harriet Bacon RN  Outcome: Progressing Towards Goal     Problem: Pressure Injury - Risk of  Goal: *Prevention of pressure injury  Description: Document Randal Scale and appropriate interventions in the flowsheet.   7/1/2020 0502 by Harriet Baocn RN  Outcome: Progressing Towards Goal  Note: Pressure Injury Interventions:  Sensory Interventions: Assess changes in LOC, Avoid rigorous massage over bony prominences    Moisture Interventions: Absorbent underpads, Limit adult briefs, Minimize layers    Activity Interventions: Increase time out of bed, Pressure redistribution bed/mattress(bed type), PT/OT evaluation    Mobility Interventions: HOB 30 degrees or less, Pressure redistribution bed/mattress (bed type), PT/OT evaluation    Nutrition Interventions: Document food/fluid/supplement intake, Offer support with meals,snacks and hydration    Friction and Shear Interventions: Apply protective barrier, creams and emollients, Foam dressings/transparent film/skin sealants, HOB 30 degrees or less, Lift sheet, Minimize layers             7/1/2020 0500 by Harriet Bacon RN  Outcome: Progressing Towards Goal  Note: Pressure Injury Interventions:  Sensory Interventions: Assess changes in LOC, Avoid rigorous massage over bony prominences    Moisture Interventions: Absorbent underpads, Limit adult briefs, Minimize layers    Activity Interventions: Increase time out of bed, Pressure redistribution bed/mattress(bed type), PT/OT evaluation    Mobility Interventions: HOB 30 degrees or less, Pressure redistribution bed/mattress (bed type), PT/OT evaluation    Nutrition Interventions: Document food/fluid/supplement intake, Offer support with meals,snacks and hydration    Friction and Shear Interventions: Apply protective barrier, creams and emollients, Foam dressings/transparent film/skin sealants, HOB 30 degrees or less, Lift sheet, Minimize layers                Problem: Patient Education: Go to Patient Education Activity  Goal: Patient/Family Education  7/1/2020 0502 by Aleks Gutierrez RN  Outcome: Progressing Towards Goal  7/1/2020 0500 by Aleks Gutierrez RN  Outcome: Progressing Towards Goal     Problem: Patient Education: Go to Patient Education Activity  Goal: Patient/Family Education  7/1/2020 0502 by Aleks Gutierrez RN  Outcome: Progressing Towards Goal  7/1/2020 0500 by Aleks Gutierrez RN  Outcome: Progressing Towards Goal     Problem: Patient Education: Go to Patient Education Activity  Goal: Patient/Family Education  Outcome: Progressing Towards Goal     Problem: Small Bowel Obstruction: Day 3  Goal: Off Pathway (Use only if patient is Off Pathway)  Outcome: Progressing Towards Goal  Goal: Activity/Safety  Outcome: Progressing Towards Goal  Goal: Consults, if ordered  Outcome: Progressing Towards Goal  Goal: Diagnostic Test/Procedures  Outcome: Progressing Towards Goal  Goal: Nutrition/Diet  Outcome: Progressing Towards Goal  Goal: Discharge Planning  Outcome: Progressing Towards Goal  Goal: Medications  Outcome: Progressing Towards Goal  Goal: Respiratory  Outcome: Progressing Towards Goal  Goal: Treatments/Interventions/Procedures  Outcome: Progressing Towards Goal  Goal: Psychosocial  Outcome: Progressing Towards Goal  Goal: *Optimal pain control at patient's stated goal  Outcome: Progressing Towards Goal  Goal: *Adequate urinary output (equal to or greater than 30 milliliters/hour)  Outcome: Progressing Towards Goal  Goal: *Hemodynamically stable  Outcome: Progressing Towards Goal  Goal: *Adequate nutrition  Outcome: Progressing Towards Goal  Goal: *Demonstrates progressive activity  Outcome: Progressing Towards Goal  Goal: *Participates in discharge planning  Outcome: Progressing Towards Goal

## 2020-07-01 NOTE — PROGRESS NOTES
Problem: Falls - Risk of  Goal: *Absence of Falls  Description: Document Leonel Meza Fall Risk and appropriate interventions in the flowsheet.   Outcome: Progressing Towards Goal  Note: Fall Risk Interventions:  Mobility Interventions: Bed/chair exit alarm, Communicate number of staff needed for ambulation/transfer, OT consult for ADLs, Patient to call before getting OOB, PT Consult for mobility concerns    Mentation Interventions: Bed/chair exit alarm, Door open when patient unattended, Increase mobility    Medication Interventions: Bed/chair exit alarm, Patient to call before getting OOB, Teach patient to arise slowly    Elimination Interventions: Call light in reach, Patient to call for help with toileting needs, Stay With Me (per policy), Toilet paper/wipes in reach, Toileting schedule/hourly rounds              Problem: Patient Education: Go to Patient Education Activity  Goal: Patient/Family Education  Outcome: Progressing Towards Goal     Problem: General Medical Care Plan  Goal: *Vital signs within specified parameters  Outcome: Progressing Towards Goal  Goal: *Labs within defined limits  Outcome: Progressing Towards Goal  Goal: *Absence of infection signs and symptoms  Outcome: Progressing Towards Goal  Goal: *Optimal pain control at patient's stated goal  Outcome: Progressing Towards Goal  Goal: *Skin integrity maintained  Outcome: Progressing Towards Goal  Goal: *Fluid volume balance  Outcome: Progressing Towards Goal  Goal: *Optimize nutritional status  Outcome: Progressing Towards Goal  Goal: *Anxiety reduced or absent  Outcome: Progressing Towards Goal  Goal: *Progressive mobility and function (eg: ADL's)  Outcome: Progressing Towards Goal     Problem: Patient Education: Go to Patient Education Activity  Goal: Patient/Family Education  Outcome: Progressing Towards Goal     Problem: Gas Exchange - Impaired  Goal: *Absence of hypoxia  Outcome: Progressing Towards Goal     Problem: Pressure Injury - Risk of  Goal: *Prevention of pressure injury  Description: Document Randal Scale and appropriate interventions in the flowsheet.   Outcome: Progressing Towards Goal  Note: Pressure Injury Interventions:  Sensory Interventions: Assess changes in LOC, Avoid rigorous massage over bony prominences    Moisture Interventions: Absorbent underpads, Limit adult briefs, Minimize layers    Activity Interventions: Increase time out of bed, Pressure redistribution bed/mattress(bed type), PT/OT evaluation    Mobility Interventions: HOB 30 degrees or less, Pressure redistribution bed/mattress (bed type), PT/OT evaluation    Nutrition Interventions: Document food/fluid/supplement intake, Offer support with meals,snacks and hydration    Friction and Shear Interventions: Apply protective barrier, creams and emollients, Foam dressings/transparent film/skin sealants, HOB 30 degrees or less, Lift sheet, Minimize layers                Problem: Patient Education: Go to Patient Education Activity  Goal: Patient/Family Education  Outcome: Progressing Towards Goal     Problem: Patient Education: Go to Patient Education Activity  Goal: Patient/Family Education  Outcome: Progressing Towards Goal

## 2020-07-01 NOTE — PROGRESS NOTES
H&P/Consult Note/Progress Note/Office Note:   Latrice Stroud  MRN: [id number]  :1960  Age:59 y.o.    HPI: Latrice Stroud is a 62 y.o. female who is seen for SBO. She developed nausea/vomiting with abdominal pain symptoms 12 days ago, she has been in and out of [facility], was just discharged two days ago, but her symptoms returned today with nausea, vomiting. Her last BM and flatus was about 2 days ago. Her outside CT reports SBO with possible incarcerated hernia. She is extremely obese with BMI 63. She had numerous abdominal surgeries including open gastric bypass, open rashid. Her other medical issues are reviewed as below. 2020: Awake in bed, no complaints. Abd obese but soft. AF, VSS. +flatus. No BM. NG output clear with brown tinge--550mL out last 24h.   2020 Awake, no complaints. Abd soft. AF, VSS. Awaiting SBFT. 1L NGT output/24h  2020 Awake in bed, no complaints. Denies n/v. +flatus and large BM after SBFT. SBFT with contrast in colon.      Past Medical History:   Diagnosis Date    Acquired hypothyroidism 2017    Anxiety     Arthritis     Depression      & ANXIETY    Fibromyalgia     Hypertension 1977    Obesity, morbid ([address]     Psychiatric disorder 2004    Anxiety disorder    Psychiatric disorder     depression    Sleep apnea     uses c-pap, non compliant    Type 2 diabetes mellitus without complication, without long-term current use of insulin ([facility] 2017    Unspecified adverse effect of anesthesia     after gastric bypass, woke up on vent     Past Surgical History:   Procedure Laterality Date    HX APPENDECTOMY      HX CHOLECYSTECTOMY      HX DILATION AND CURETTAGE      X 2    HX GASTRIC BYPASS      Dr. Sg Hernandez    rt knee has screw in knee tendons streched    HX ORTHOPAEDIC  2009    Right knee replacement    [place] UNS ORAL SURG PROC BY REPORT   Current Facility-Administered Medications   Medication Dose Route Frequency    phenol throat spray (CHLORASEPTIC) 1 Spray  1 [place] Oral PRN    budesonide-formoteroL (SYMBICORT) 160-4.5 mcg/actuation HFA inhaler 2 Puff  2 Puff Inhalation BID RT    fluticasone propionate (FLONASE) 50 mcg/actuation nasal spray 2 Spray  2 Spray Both Nostrils DAILY    sodium chloride (NS) flush 5-40 mL  5-40 mL IntraVENous Q8H    sodium chloride (NS) flush 5-40 mL  5-40 mL IntraVENous PRN    naloxone (NARCAN) injection 0.4 mg  0.4 mg IntraVENous PRN    morphine 10 mg/ml injection 5 mg  5 mg IntraVENous Q4H PRN    ondansetron (ZOFRAN) injection 4 mg  4 mg IntraVENous Q4H PRN    magnesium hydroxide (MILK OF MAGNESIA) 400 mg/5 mL oral suspension 30 mL  30 mL Oral DAILY PRN    heparin (porcine) injection 5,000 Units  5,000 Units SubCUTAneous Q8H    hydrALAZINE (APRESOLINE) 20 mg/mL injection 20 mg  20 mg IntraVENous Q6H PRN    [START ON 7/5/2020] levothyroxine (SYNTHROID) injection 56 mcg  56 mcg IntraVENous DAILY     Codeine and Sulfa (sulfonamide antibiotics)  Social History     Socioeconomic History    Marital status:      Spouse name: Not on file    Number of children: Not on file    Years of education: Not on file    Highest education level: Not on file   Tobacco Use    Smoking status: Never Smoker    Smokeless tobacco: Never Used   Substance and Sexual Activity    Alcohol use: No    Drug use: No     Social History     Tobacco Use   Smoking Status Never Smoker   Smokeless Tobacco Never Used     Family History   Problem Relation Age of Onset    Post-op Nausea/Vomiting Mother     COPD Mother     Heart Disease Mother     Heart Attack Mother    [place] Arthritis-rheumatoid Mother     Diabetes Mother     Hypertension Mother     Heart Disease Father     Cancer Father     Heart Attack Father     Hypertension Father     Elevated Lipids Father     Hypertension Sister     Heart Attack Brother     Diabetes Maternal Grandfather     Ovarian Cancer Paternal Grandmother      ROS: The patient has no difficulty with chest pain or shortness of breath. No fever or chills. Comprehensive review of systems was otherwise unremarkable except as noted above. Physical Exam:   Visit Vitals  /84 (BP 1 Location: Right arm, BP Patient Position: At rest)   Pulse 88   Temp 98 °F (36.7 °C)   Resp 16   Ht 5' 2\" (1.575 m)   Wt (!) 377 lb 12.8 oz (171.4 kg)   SpO2 95%   BMI 69.10 kg/m²     Vitals:    06/30/20 2333 07/01/20 0322 07/01/20 0738 07/01/20 0802   BP: 148/72 123/68  177/84   Pulse: 100 99  88   Resp: 15 15  16   Temp: 98.4 °F (36.9 °C) 97.8 °F (36.6 °C)  98 °F (36.7 °C)   SpO2: 95% 90% 94% 95%   Weight:       Height:         No intake/output data recorded. 06/29 1901 - 07/01 0700  In: -   Out: 2100     Constitutional: Alert, oriented, cooperative patient in no acute distress; appears stated age    Eyes:Sclera are clear. EOMs intact  ENMT: no external lesions gross hearing normal; no obvious neck masses, no ear or lip lesions, nares normal  CV: RRR. Normal perfusion  Resp: No JVD. Breathing is  non-labored; no audible wheezing. GI: soft, obese. A large left abdominal wall hernia, which is softer per her. Mild tenderness at low abdomen. No peritoneal signs. Musculoskeletal: unremarkable with normal function. No embolic signs or cyanosis.    Neuro:  Oriented; moves all 4; no focal deficits  Psychiatric: normal affect and mood, no memory impairment    Recent vitals (if inpt):  Patient Vitals for the past 24 hrs:   BP Temp Pulse Resp SpO2   07/01/20 0802 177/84 98 °F (36.7 °C) 88 16 95 %   07/01/20 0738     94 %   07/01/20 0322 123/68 97.8 °F (36.6 °C) 99 15 90 %   06/30/20 2333 148/72 98.4 °F (36.9 °C) 100 15 95 %   06/30/20 1954 (!) 181/98 98.2 °F (36.8 °C) 85 15 92 %   06/30/20 1935     94 %   06/30/20 1716 (!) 173/96 97.4 °F (36.3 °C) 99 15 93 %       Labs:  Recent Labs     07/01/20  0431 06/30/20  1794 06/30/20  0515   WBC  --  12.9* 8.3   HGB  --  15.2 13.7   PLT  --  314 311    140 140   K 4.5 3.9 3.6    101 100   CO2 21 24 28   BUN 14 12 12   CREA 0.69 0.69 0.66   * 126* 92   TBILI  --   --  0.6   ALT  --   --  22   AP  --   --  120       Lab Results   Component Value Date/Time    WBC 12.9 (H) 06/30/2020 06:59 PM    HGB 15.2 06/30/2020 06:59 PM    PLATELET 134 45/27/0823 06:59 PM    Sodium 139 07/01/2020 04:31 AM    Potassium 4.5 07/01/2020 04:31 AM    Chloride 103 07/01/2020 04:31 AM    CO2 21 07/01/2020 04:31 AM    BUN 14 07/01/2020 04:31 AM    Creatinine 0.69 07/01/2020 04:31 AM    Glucose 105 (H) 07/01/2020 04:31 AM    INR 1.2 10/29/2009 05:05 AM    aPTT 26.4 10/21/2009 10:00 AM    Bilirubin, total 0.6 06/30/2020 05:15 AM    ALT (SGPT) 22 06/30/2020 05:15 AM    Alk. phosphatase 120 06/30/2020 05:15 AM       CT Results  (Last 48 hours)    None        chest X-ray  XR Results (most recent):  Results from [place] encounter on 06/27/20   XR GASTROGRAFFIN SMALL BOWEL    Narrative SMALL BOWEL FOLLOW-THROUGH. HISTORY: Partial small bowel obstruction. COMPARISON: None. TECHNIQUE: 200 cc full strength Gastrografin was instilled through an is a  stable NG tube. FINDINGS:  film demonstrates contrast in the colon from recent CT scan. Proximal small bowel loops remain dilated. At the end of the study at 4-1/2  hours I believe there is more contrast in the right colon although the terminal  ileum was hard to identify. Fluoroscopy time: Less than 2 min. Static images#: 22      Impression IMPRESSION: Terminal ileum is difficult to evaluate due to body habitus, however  I believe there is more contrast in the colon than at the beginning of the  study. Some dilated small bowel loops persist, probably ileus versus partial  small bowel obstruction. Repeat KUB in the morning would be helpful.              I reviewed recent labs, recent radiologic studies, and pertinent records including other doctor notes if needed. I independently reviewed radiology images for studies I described above or studies I have ordered.    Admission date (for inpatients): 6/27/2020   * No surgery found *  * No surgery found *    ASSESSMENT/PLAN:  Problem List  Date Reviewed: 2/27/2020          Codes Class Noted    Hypokalemia ICD-10-CM: E87.6  ICD-9-CM: 276.8  6/28/2020        * (Principal) Small bowel obstruction ([facility] ICD-10-CM: K56.609  ICD-9-CM: 560.9  6/27/2020        FORREST (obstructive sleep apnea) ICD-10-CM: G47.33  ICD-9-CM: 327.23  6/27/2020        Chronic bilateral low back pain ICD-10-CM: M54.5, G89.29  ICD-9-CM: 724.2, 338.29  6/27/2020        Type 2 diabetes with nephropathy ([facility] ICD-10-CM: E11.21  ICD-9-CM: 250.40, 583.81  2/27/2020        Depression, major, recurrent, mild (HCC) ICD-10-CM: F33.0  ICD-9-CM: 296.31  2/27/2020        Shortness of breath on exertion ICD-10-CM: R06.02  ICD-9-CM: 786.05  11/25/2019        Morbid obesity with BMI of 70 and over, adult [facility]) ICD-10-CM: E66.01, Z68.45  ICD-9-CM: 278.01, V85.45  12/12/2018        Poor tolerance for ambulation ICD-10-CM: Z78.9  ICD-9-CM: V49.89  10/11/2018        Type 2 diabetes mellitus with diabetic neuropathy ([facility] ICD-10-CM: E11.40  ICD-9-CM: 250.60, 357.2  9/24/2018        Obesity, morbid ([facility] ICD-10-CM: E66.01  ICD-9-CM: 278.01  12/27/2017        Restless legs syndrome ICD-10-CM: G25.81  ICD-9-CM: 333.94  12/27/2017        Anxiety ICD-10-CM: F41.9  ICD-9-CM: 300.00  12/27/2017        Essential hypertension ICD-10-CM: I10  ICD-9-CM: 401.9  12/27/2017        Migraine without status migrainosus, not intractable ICD-10-CM: A41.585  ICD-9-CM: 346.90  12/27/2017        Type 2 diabetes mellitus without complication, without long-term current use of insulin (HCC) ICD-10-CM: E11.9  ICD-9-CM: 250.00  12/27/2017        Acquired hypothyroidism ICD-10-CM: E03.9  ICD-9-CM: 244.9  12/27/2017        Osteoarthritis of left knee ICD-10-CM: M17.12  ICD-9-CM: 715.96  10/26/2009        S/P total knee replacement using cement ICD-10-CM: Z96.659  ICD-9-CM: V43.65  10/26/2009        Acute blood loss anemia ICD-10-CM: D62  ICD-9-CM: 285.1  7/28/2009        Osteoarthritis of right knee ICD-10-CM: M17.11  ICD-9-CM: 715.96  7/27/2009        S/P total knee replacement using cement ICD-10-CM: Z96.659  ICD-9-CM: V43.65  7/27/2009            Principal Problem:    Small bowel obstruction ([facility] (6/27/2020)    Active Problems:    Obesity, morbid ([facility] (12/27/2017)      Essential hypertension (12/27/2017)      Type 2 diabetes mellitus with diabetic neuropathy ([facility] (9/24/2018)      FORREST (obstructive sleep apnea) (6/27/2020)      Chronic bilateral low back pain (6/27/2020)      Hypokalemia (6/28/2020)       Care management per primary team  IVF  Clamp NGT-make npo and return to suction if starts to vomit  Clear liquids  DC NGT this afternoon if tolerates clear liquids     Lillima Giron NP

## 2020-07-01 NOTE — PROGRESS NOTES
Progress Note    Patient: Cecilio Viera MRN: [id number]  SSN: [id number]    YOB: 1960  Age: 62 y.o. Sex: female      Admit Date: 6/27/2020    LOS: 4 days     Subjective:     Seen and examined, had BMs, improving. Objective:     Vitals:    07/01/20 0738 07/01/20 0802 07/01/20 1204 07/01/20 1617   BP:  177/84 186/74 168/53   Pulse:  88 76 79   Resp:  16 15 16   Temp:  98 °F (36.7 °C) 97.8 °F (36.6 °C) 97.8 °F (36.6 °C)   SpO2: 94% 95% 94% 95%   Weight:       Height:            Intake and Output:  Current Shift: No intake/output data recorded. Last three shifts: 06/29 1901 - 07/01 0700  In: -   Out: 2100     Physical Exam:   GENERAL: alert, cooperative, no distress, appears stated age  LUNG: clear to auscultation bilaterally  HEART: regular rate and rhythm, S1, S2 normal, no murmur, click, rub or gallop  ABDOMEN: large ventral hernia, but not painful  EXTREMITIES:  extremities normal, atraumatic, no cyanosis or edema    Lab/Data Review: All lab results for the last 24 hours reviewed.        Assessment:     Principal Problem:    Small bowel obstruction ([address] (6/27/2020)    Active Problems:    Obesity, morbid ([address] (12/27/2017)      Essential hypertension (12/27/2017)      Type 2 diabetes mellitus with diabetic neuropathy ([address] (9/24/2018)      FORREST (obstructive sleep apnea) (6/27/2020)      Chronic bilateral low back pain (6/27/2020)      Hypokalemia (6/28/2020)        Plan:     SBO  Resolving  NG to be clamped, surgery following  Partial  Continue NPO  Pain medication, surgery following  Repeat KUB in AM     Diabetes mellitus type  Continue sliding scale     Obstructive sleep apnea  continue CPAP as per home     Hypokalemia  Resolved continue to monitor BMP      Signed By: Naheed Chiu MD     July 1, 2020

## 2020-07-02 ENCOUNTER — APPOINTMENT (OUTPATIENT)
Dept: GENERAL RADIOLOGY | Age: 60
DRG: 394 | End: 2020-07-02
Attending: INTERNAL MEDICINE
Payer: MEDICARE

## 2020-07-02 LAB
ANION GAP SERPL CALC-SCNC: 19 MMOL/L (ref 7–16)
BASOPHILS # BLD: 0.1 K/UL (ref 0–0.2)
BASOPHILS NFR BLD: 1 % (ref 0–2)
BUN SERPL-MCNC: 14 MG/DL (ref 8–23)
CALCIUM SERPL-MCNC: 9.5 MG/DL (ref 8.3–10.4)
CHLORIDE SERPL-SCNC: 101 MMOL/L (ref 98–107)
CO2 SERPL-SCNC: 17 MMOL/L (ref 21–32)
CREAT SERPL-MCNC: 0.68 MG/DL (ref 0.6–1)
DIFFERENTIAL METHOD BLD: ABNORMAL
EOSINOPHIL # BLD: 0.1 K/UL (ref 0–0.8)
EOSINOPHIL NFR BLD: 1 % (ref 0.5–7.8)
ERYTHROCYTE [DISTWIDTH] IN BLOOD BY AUTOMATED COUNT: 13 % (ref 11.9–14.6)
GLUCOSE SERPL-MCNC: 116 MG/DL (ref 65–100)
HCT VFR BLD AUTO: 48.6 % (ref 35.8–46.3)
HGB BLD-MCNC: 14.5 G/DL (ref 11.7–15.4)
IMM GRANULOCYTES # BLD AUTO: 0.1 K/UL (ref 0–0.5)
IMM GRANULOCYTES NFR BLD AUTO: 1 % (ref 0–5)
LYMPHOCYTES # BLD: 1.8 K/UL (ref 0.5–4.6)
LYMPHOCYTES NFR BLD: 20 % (ref 13–44)
MCH RBC QN AUTO: 30.4 PG (ref 26.1–32.9)
MCHC RBC AUTO-ENTMCNC: 29.8 G/DL (ref 31.4–35)
MCV RBC AUTO: 101.9 FL (ref 79.6–97.8)
MONOCYTES # BLD: 0.8 K/UL (ref 0.1–1.3)
MONOCYTES NFR BLD: 9 % (ref 4–12)
NEUTS SEG # BLD: 6.5 K/UL (ref 1.7–8.2)
NEUTS SEG NFR BLD: 70 % (ref 43–78)
NRBC # BLD: 0 K/UL (ref 0–0.2)
PLATELET # BLD AUTO: 263 K/UL (ref 150–450)
PMV BLD AUTO: 10.7 FL (ref 9.4–12.3)
POTASSIUM SERPL-SCNC: 3.9 MMOL/L (ref 3.5–5.1)
RBC # BLD AUTO: 4.77 M/UL (ref 4.05–5.2)
SODIUM SERPL-SCNC: 137 MMOL/L (ref 136–145)
WBC # BLD AUTO: 9.3 K/UL (ref 4.3–11.1)

## 2020-07-02 PROCEDURE — 74011250637 HC RX REV CODE- 250/637: Performed by: INTERNAL MEDICINE

## 2020-07-02 PROCEDURE — 97530 THERAPEUTIC ACTIVITIES: CPT

## 2020-07-02 PROCEDURE — 65270000029 HC RM PRIVATE

## 2020-07-02 PROCEDURE — 74011250636 HC RX REV CODE- 250/636: Performed by: INTERNAL MEDICINE

## 2020-07-02 PROCEDURE — 94760 N-INVAS EAR/PLS OXIMETRY 1: CPT

## 2020-07-02 PROCEDURE — 94640 AIRWAY INHALATION TREATMENT: CPT

## 2020-07-02 PROCEDURE — 74018 RADEX ABDOMEN 1 VIEW: CPT

## 2020-07-02 PROCEDURE — 36415 COLL VENOUS BLD VENIPUNCTURE: CPT

## 2020-07-02 PROCEDURE — 85025 COMPLETE CBC W/AUTO DIFF WBC: CPT

## 2020-07-02 PROCEDURE — 80048 BASIC METABOLIC PNL TOTAL CA: CPT

## 2020-07-02 RX ORDER — GABAPENTIN 400 MG/1
400 CAPSULE ORAL 3 TIMES DAILY
Status: DISCONTINUED | OUTPATIENT
Start: 2020-07-02 | End: 2020-07-03 | Stop reason: HOSPADM

## 2020-07-02 RX ADMIN — MORPHINE SULFATE 5 MG: 10 INJECTION, SOLUTION INTRAMUSCULAR; INTRAVENOUS at 09:08

## 2020-07-02 RX ADMIN — Medication 5 ML: at 14:26

## 2020-07-02 RX ADMIN — ONDANSETRON 4 MG: 2 INJECTION INTRAMUSCULAR; INTRAVENOUS at 17:25

## 2020-07-02 RX ADMIN — MORPHINE SULFATE 5 MG: 10 INJECTION, SOLUTION INTRAMUSCULAR; INTRAVENOUS at 14:26

## 2020-07-02 RX ADMIN — Medication 10 ML: at 21:33

## 2020-07-02 RX ADMIN — GABAPENTIN 400 MG: 400 CAPSULE ORAL at 18:13

## 2020-07-02 RX ADMIN — HYDRALAZINE HYDROCHLORIDE 20 MG: 20 INJECTION, SOLUTION INTRAMUSCULAR; INTRAVENOUS at 06:21

## 2020-07-02 RX ADMIN — HEPARIN SODIUM 5000 UNITS: 5000 INJECTION INTRAVENOUS; SUBCUTANEOUS at 05:08

## 2020-07-02 RX ADMIN — Medication 5 ML: at 05:08

## 2020-07-02 RX ADMIN — FLUTICASONE PROPIONATE 2 SPRAY: 50 SPRAY, METERED NASAL at 09:00

## 2020-07-02 RX ADMIN — BUDESONIDE AND FORMOTEROL FUMARATE DIHYDRATE 2 PUFF: 160; 4.5 AEROSOL RESPIRATORY (INHALATION) at 20:14

## 2020-07-02 RX ADMIN — HEPARIN SODIUM 5000 UNITS: 5000 INJECTION INTRAVENOUS; SUBCUTANEOUS at 14:26

## 2020-07-02 RX ADMIN — MORPHINE SULFATE 5 MG: 10 INJECTION, SOLUTION INTRAMUSCULAR; INTRAVENOUS at 21:33

## 2020-07-02 RX ADMIN — HEPARIN SODIUM 5000 UNITS: 5000 INJECTION INTRAVENOUS; SUBCUTANEOUS at 21:32

## 2020-07-02 RX ADMIN — MORPHINE SULFATE 5 MG: 10 INJECTION, SOLUTION INTRAMUSCULAR; INTRAVENOUS at 05:08

## 2020-07-02 RX ADMIN — GABAPENTIN 400 MG: 400 CAPSULE ORAL at 21:33

## 2020-07-02 NOTE — PROGRESS NOTES
Patient does not feel comfortable going home today. Patient began to feel nauseated once diet was upgraded. Discharge placed on hold.

## 2020-07-02 NOTE — PROGRESS NOTES
Pt is medically cleared for discharge to home today. No dc needs identified or reported.       Care Management Interventions  PCP Verified by CM: Yes(VIRTUAL VISIT)  Last Visit to PCP: 05/27/20  Discharge Location  Discharge Placement: Home

## 2020-07-02 NOTE — PROGRESS NOTES
Problem: Falls - Risk of  Goal: *Absence of Falls  Description: Document Maylin Hannah Fall Risk and appropriate interventions in the flowsheet.   Outcome: Progressing Towards Goal  Note: Fall Risk Interventions:  Mobility Interventions: Communicate number of staff needed for ambulation/transfer, OT consult for ADLs, Patient to call before getting OOB, PT Consult for mobility concerns    Mentation Interventions: Bed/chair exit alarm, Door open when patient unattended, Increase mobility    Medication Interventions: Patient to call before getting OOB, Teach patient to arise slowly    Elimination Interventions: Call light in reach, Patient to call for help with toileting needs, Stay With Me (per policy), Toilet paper/wipes in reach, Toileting schedule/hourly rounds              Problem: Patient Education: Go to Patient Education Activity  Goal: Patient/Family Education  Outcome: Progressing Towards Goal     Problem: General Medical Care Plan  Goal: *Vital signs within specified parameters  Outcome: Progressing Towards Goal  Goal: *Labs within defined limits  Outcome: Progressing Towards Goal  Goal: *Absence of infection signs and symptoms  Outcome: Progressing Towards Goal  Goal: *Optimal pain control at patient's stated goal  Outcome: Progressing Towards Goal  Goal: *Skin integrity maintained  Outcome: Progressing Towards Goal  Goal: *Fluid volume balance  Outcome: Progressing Towards Goal  Goal: *Optimize nutritional status  Outcome: Progressing Towards Goal  Goal: *Anxiety reduced or absent  Outcome: Progressing Towards Goal  Goal: *Progressive mobility and function (eg: ADL's)  Outcome: Progressing Towards Goal     Problem: Patient Education: Go to Patient Education Activity  Goal: Patient/Family Education  Outcome: Progressing Towards Goal     Problem: Gas Exchange - Impaired  Goal: *Absence of hypoxia  Outcome: Progressing Towards Goal     Problem: Pressure Injury - Risk of  Goal: *Prevention of pressure injury  Description: Document Randal Scale and appropriate interventions in the flowsheet.   Outcome: Progressing Towards Goal  Note: Pressure Injury Interventions:  Sensory Interventions: Assess changes in LOC, Avoid rigorous massage over bony prominences    Moisture Interventions: Absorbent underpads, Apply protective barrier, creams and emollients, Check for incontinence Q2 hours and as needed, Limit adult briefs, Maintain skin hydration (lotion/cream), Minimize layers, Moisture barrier, Offer toileting Q_hr    Activity Interventions: Increase time out of bed, Pressure redistribution bed/mattress(bed type), PT/OT evaluation    Mobility Interventions: HOB 30 degrees or less, Pressure redistribution bed/mattress (bed type), PT/OT evaluation    Nutrition Interventions: Document food/fluid/supplement intake    Friction and Shear Interventions: Apply protective barrier, creams and emollients, Foam dressings/transparent film/skin sealants, HOB 30 degrees or less, Lift sheet, Minimize layers                Problem: Patient Education: Go to Patient Education Activity  Goal: Patient/Family Education  Outcome: Progressing Towards Goal     Problem: Patient Education: Go to Patient Education Activity  Goal: Patient/Family Education  Outcome: Progressing Towards Goal     Problem: Patient Education: Go to Patient Education Activity  Goal: Patient/Family Education  Outcome: Progressing Towards Goal     Problem: Small Bowel Obstruction: Day 3  Goal: Off Pathway (Use only if patient is Off Pathway)  Outcome: Progressing Towards Goal  Goal: Activity/Safety  Outcome: Progressing Towards Goal  Goal: Consults, if ordered  Outcome: Progressing Towards Goal  Goal: Diagnostic Test/Procedures  Outcome: Progressing Towards Goal  Goal: Nutrition/Diet  Outcome: Progressing Towards Goal  Goal: Discharge Planning  Outcome: Progressing Towards Goal  Goal: Medications  Outcome: Progressing Towards Goal  Goal: Respiratory  Outcome: Progressing Towards Goal  Goal: Treatments/Interventions/Procedures  Outcome: Progressing Towards Goal  Goal: Psychosocial  Outcome: Progressing Towards Goal  Goal: *Optimal pain control at patient's stated goal  Outcome: Progressing Towards Goal  Goal: *Adequate urinary output (equal to or greater than 30 milliliters/hour)  Outcome: Progressing Towards Goal  Goal: *Hemodynamically stable  Outcome: Progressing Towards Goal  Goal: *Adequate nutrition  Outcome: Progressing Towards Goal  Goal: *Demonstrates progressive activity  Outcome: Progressing Towards Goal  Goal: *Participates in discharge planning  Outcome: Progressing Towards Goal

## 2020-07-02 NOTE — PROGRESS NOTES
Problem: Mobility Impaired (Adult and Pediatric)  Goal: *Acute Goals and Plan of Care (Insert Text)  Description: STG:  (1.)Ms. Kirill Chacon will move from supine to sit and sit to supine , scoot up and down and roll side to side with MODIFIED INDEPENDENCE within 3 treatment day(s). GOAL MET 7/2/2020  (2.)Ms. Kirill Chacon will transfer from bed to chair and chair to bed with SUPERVISION using the least restrictive device within 3 treatment day(s). (3.)Ms. Kirill Chacon will ambulate with STAND BY ASSIST for 75 feet with the least restrictive device within 3 treatment day(s). (4.)Ms. Kirill Chacon will perform standing static and dynamic balance activities x 15 minutes with STAND BY ASSIST to improve safety within 3 day(s). (5.)Ms. Kirill Chacon will maintain stable vital signs throughout all functional mobility within 3 days. LTG:  (1.)Ms. Kirill Chacon will move from supine to sit and sit to supine , scoot up and down and roll side to side in bed with INDEPENDENT within 7 treatment day(s). (2.)Ms. Kirill Chacon will transfer from bed to chair and chair to bed with MODIFIED INDEPENDENCE using the least restrictive device within 7 treatment day(s). (3.)Ms. Kirill Chacon will ambulate with MODIFIED INDEPENDENCE for 125 feet with the least restrictive device within 7 treatment day(s). (4.)Ms. Kirill Chacon will perform standing static and dynamic balance activities x 15 minutes with MODIFIED INDEPENDENCE to improve safety within 7 day(s).   ________________________________________________________________________________________________     Outcome: Progressing Towards Goal     PHYSICAL THERAPY: Daily Note and AM 7/2/2020  INPATIENT: PT Visit Days : 3  [place] / [place] / Product Type: Medicare /       NAME/AGE/GENDER: Huy Becerra is a 62 y.o. female   PRIMARY DIAGNOSIS: Small bowel obstruction ([address] [K56.609] Small bowel obstruction ([facility] Small bowel obstruction ([facility]       ICD-10: Treatment Diagnosis:    · Generalized Muscle Weakness (M62.81)  · Difficulty in walking, Not elsewhere classified (R26.2)  · Repeated Falls (R29.6)   Precaution/Allergies:  Codeine and Sulfa (sulfonamide antibiotics)      ASSESSMENT:     Ms. Justus Ruggiero is a 62 y.o. female admitted for small bowel obstruction. She lives with sister in law in a 2 story home with all needs on the main level. She typically ambulates with a straight cane, admits to 3 falls in the past 6 months. She reports lately she has been scooting around in an office chair and that she is working towards getting an electric wheelchair, which she would significantly benefit from due to poor activity tolerance. Patient was supine upon contact and agreeable to PT. Patient performs supine to sit with mod I and transfers to standing with SBA-supervision. Once standing patient 27' with rolling walker, CGA, and cues for sequencing with rolling walker. Patient was quite fatigued post ambulation needing a seated rest break. Patient then participates in therapeutic strengthening exercises to improve functional strength for transfers, gait and overall mobility. Patient requires cues to perform exercises correctly. Overall steady progress towards physical therapy goals. Goal in red above was met today and all other goals listed above are still appropriate. Will continue skilled PT to address remaining deficits. This section established at most recent assessment   PROBLEM LIST (Impairments causing functional limitations):  1. Decreased Strength  2. Decreased ADL/Functional Activities  3. Decreased Transfer Abilities  4. Decreased Ambulation Ability/Technique  5. Decreased Balance  6. Decreased Activity Tolerance  7. Decreased Pacing Skills  8. Increased Shortness of Breath  9. Decreased Knowledge of Precautions  10. Decreased [place] with Home Exercise Program   INTERVENTIONS PLANNED: (Benefits and precautions of physical therapy have been discussed with the patient.)  1.  Balance Exercise  2. Bed Mobility  3. Family Education  4. Gait Training  5. Home Exercise Program (HEP)  6. Neuromuscular Re-education/Strengthening  7. Therapeutic Activites  8. Therapeutic Exercise/Strengthening  9. Transfer Training     TREATMENT PLAN: Frequency/Duration: 3 times a week for duration of hospital stay  Rehabilitation Potential For Stated Goals: Good     REHAB RECOMMENDATIONS (at time of discharge pending progress):    Placement: It is my opinion, based on this patient's performance to date, that Ms. Kirill Chacon may benefit from OUTPATIENT THERAPY after discharge due to the functional deficits listed above that are likely to improve with skilled rehabilitation because because he/she will benefit from the individualized approach tailored to his/her deficits. Equipment:    None at this time              HISTORY:   History of Present Injury/Illness (Reason for Referral): Huy Becerra is a 62 y.o. female with past medical history significant for morbid obesity, hypertension, anxiety, sleep apnea, hypothyroidism presents as a direct transfer from urgent care center after being found with a small bowel obstruction. Patient reports symptoms for started 12 days ago. At that time, patient states symptoms are mild. He did not improve so she went to an urgent care center on Tuesday. Imaging done there showed a small bowel obstruction. Patient was admitted to another facility overnight for management of small bowel obstruction. She did not have NG tube placed at that time and she had a large bowel movement in the morning and was able to tolerate liquid diet. She was discharged following day and went home. However, after discharge she noted that her stomach was becoming more uncomfortable. Symptoms continue to worsen and she decided to go to urgent care this morning. She had reimaging done by CT scan which showed a small bowel obstruction.   Patient was then transferred to [facility] for further evaluation. Patient denies any fevers, chills, shortness of breath, chest pain. She has had 10 episodes of vomiting since this morning. She reports no worsening leg swelling or bloody bowel movements. 10 systems reviewed and negative except as noted in HPI. Past Medical History/Comorbidities:   Ms. Emilee Helton  has a past medical history of Acquired hypothyroidism (12/27/2017), Anxiety, Arthritis, Depression, Fibromyalgia, Hypertension (1977), Obesity, morbid ([address] Psychiatric disorder (2004), Psychiatric disorder (2004), Sleep apnea, Type 2 diabetes mellitus without complication, without long-term current use of insulin ([address] (12/27/2017), and Unspecified adverse effect of anesthesia. She also has no past medical history of Arrhythmia, Asthma, Autoimmune disease ([address] CAD (coronary artery disease), Cancer ([address] Chronic kidney disease, Chronic pain, Coagulation defects, COPD, Difficult intubation, GERD (gastroesophageal reflux disease), Heart failure ([address] Liver disease, Malignant hyperthermia due to anesthesia, Nausea & vomiting, Other ill-defined conditions(799.89), Pseudocholinesterase deficiency, PUD (peptic ulcer disease), Seizures ([address] or Thromboembolus ([address] Ms. Emilee Helton  has a past surgical history that includes pr uns oral surg proc by report (2000); hx appendectomy (1973); hx cholecystectomy (1990); hx gastric bypass (2002); hx orthopaedic (1975); hx orthopaedic (July 2009); hx dilation and curettage; and hx gyn.   Social History/Living Environment:   Home Environment: Private residence  Wheelchair Ramp: Yes  One/Two Story Residence: Two story, live on 1st floor  Living Alone: No  Support Systems: Family member(s)  Patient Expects to be Discharged to[de-identified] Private residence  Current DME Used/Available at Home: santhosh Savage, [address] [employer] chair, Cane, straight  Tub or Shower Type: Shower  Prior Level of Function/Work/Activity:  She lives with sister in law in a 2 story home with all needs on the main level. She typically ambulates with a straight cane, admits to 3 falls in the past 6 months. She reports lately she has been scooting around in an office chair and that she is working towards getting an electric wheelchair, which she would significantly benefit from due to poor activity tolerance. Number of Personal Factors/Comorbidities that affect the Plan of Care: 3+: HIGH COMPLEXITY   EXAMINATION:   Most Recent Physical Functioning:   Gross Assessment:                  Posture:     Balance:  Sitting: Intact  Standing: Impaired  Standing - Static: Good  Standing - Dynamic : Fair Bed Mobility:  Supine to Sit: Modified independent  Wheelchair Mobility:     Transfers:  Sit to Stand: Supervision  Stand to Sit: Supervision  Gait:     Base of Support: Center of gravity altered; Widened  Speed/Bree: Slow;Shuffled  Step Length: Left shortened;Right shortened  Gait Abnormalities: Decreased step clearance;Trunk sway increased; Path deviations  Distance (ft): 30 Feet (ft)  Assistive Device: Walker, rolling  Ambulation - Level of Assistance: Contact guard assistance;Stand-by assistance  Interventions: Safety awareness training; Tactile cues; Verbal cues      Body Structures Involved:  1. Nerves  2. Heart  3. Lungs  4. Digestive Structures  5. Muscles Body Functions Affected:  1. Sensory/Pain  2. Cardio  3. Neuromusculoskeletal  4. Movement Related  5. Digestive Activities and Participation Affected:  1. Mobility  2. Self Care  3. Domestic Life  4. Interpersonal Interactions and Relationships  5. Community, Social and [place]   Number of elements that affect the Plan of Care: 4+: HIGH COMPLEXITY   CLINICAL PRESENTATION:   Presentation: Stable and uncomplicated: LOW COMPLEXITY   CLINICAL DECISION MAKIN [facility] Inpatient Short Form  How much difficulty does the patient currently have. .. Unable A Lot A Little None   1.   Turning over in bed (including adjusting bedclothes, sheets and blankets)? [] 1   [] 2   [] 3   [x] 4   2. Sitting down on and standing up from a chair with arms ( e.g., wheelchair, bedside commode, etc.)   [] 1   [] 2   [x] 3   [] 4   3. Moving from lying on back to sitting on the side of the bed? [] 1   [] 2   [] 3   [x] 4   How much help from another person does the patient currently need. .. Total A Lot A Little None   4. Moving to and from a bed to a chair (including a wheelchair)? [] 1   [] 2   [x] 3   [] 4   5. Need to walk in hospital room? [] 1   [] 2   [x] 3   [] 4   6. Climbing 3-5 steps with a railing? [] 1   [x] 2   [] 3   [] 4   © 2007, Trustees of [address] [place] [id number], under license to [employer]. All rights reserved      Score:  Initial: 19 Most Recent: X (Date: -- )    Interpretation of Tool:  Represents activities that are increasingly more difficult (i.e. Bed mobility, Transfers, Gait). Medical Necessity:     · Patient demonstrates   · good  ·  rehab potential due to higher previous functional level. Reason for Services/Other Comments:  · Patient   · continues to require modification of therapeutic interventions to increase complexity of exercises  · . Use of outcome tool(s) and clinical judgement create a POC that gives a: Clear prediction of patient's progress: LOW COMPLEXITY            TREATMENT:   (In addition to Assessment/Re-Assessment sessions the following treatments were rendered)   Pre-treatment Symptoms/Complaints:  none  Pain: Initial:   Pain Intensity 1: 0  Post Session:  0     Therapeutic Activity: (    23 minutes): Therapeutic activities including bed mobility training, transfer training, ambulation on level ground, static/dynamic standing balance, posture training, instruction in sequencing with rolling walker, LE exercises and patient education to improve mobility, strength, and balance. Required minimal Safety awareness training; Tactile cues; Verbal cues to promote static and dynamic balance in standing and promote coordination of bilateral, lower extremity(s). Braces/Orthotics/Lines/Etc:   · nasogastric tube  Treatment/Session Assessment:    · Response to Treatment:  See above  · Interdisciplinary Collaboration:   o Physical Therapy Assistant  o Registered Nurse  · After treatment position/precautions:   o Up in chair  o Bed/Chair-wheels locked  o Call light within reach  o RN notified   · Compliance with Program/Exercises: Will assess as treatment progresses  · Recommendations/Intent for next treatment session: \"Next visit will focus on advancements to more challenging activities and reduction in assistance provided\".   Total Treatment Duration:  PT Patient Time In/Time Out  Time In: 1012  Time Out: [address], PTA

## 2020-07-02 NOTE — DISCHARGE SUMMARY
Discharge Summary     Patient: Grace Alarcon MRN: [id number]  SSN: [id number]    YOB: 1960  Age: 62 y.o.   Sex: female       Admit Date: 6/27/2020    Discharge Date: 7/2/2020      Admission Diagnoses: Small bowel obstruction ([facility] [W72.937]    Discharge Diagnoses:   Problem List as of 7/2/2020 Date Reviewed: 2/27/2020          Codes Class Noted - Resolved    Hypokalemia ICD-10-CM: E87.6  ICD-9-CM: 276.8  6/28/2020 - Present        * (Principal) Small bowel obstruction ([facility] ICD-10-CM: K05.588  ICD-9-CM: 560.9  6/27/2020 - Present        FORREST (obstructive sleep apnea) ICD-10-CM: G47.33  ICD-9-CM: 327.23  6/27/2020 - Present        Chronic bilateral low back pain ICD-10-CM: M54.5, G89.29  ICD-9-CM: 724.2, 338.29  6/27/2020 - Present        Type 2 diabetes with nephropathy ([facility] ICD-10-CM: E11.21  ICD-9-CM: 250.40, 583.81  2/27/2020 - Present        Depression, major, recurrent, mild ([facility] ICD-10-CM: F33.0  ICD-9-CM: 296.31  2/27/2020 - Present        Shortness of breath on exertion ICD-10-CM: R06.02  ICD-9-CM: 786.05  11/25/2019 - Present        Morbid obesity with BMI of 70 and over, adult [facility]) ICD-10-CM: E66.01, Z68.45  ICD-9-CM: 278.01, V85.45  12/12/2018 - Present        Poor tolerance for ambulation ICD-10-CM: Z78.9  ICD-9-CM: V49.89  10/11/2018 - Present        Type 2 diabetes mellitus with diabetic neuropathy ([facility] ICD-10-CM: E11.40  ICD-9-CM: 250.60, 357.2  9/24/2018 - Present        Obesity, morbid ([address] ICD-10-CM: E66.01  ICD-9-CM: 278.01  12/27/2017 - Present        Restless legs syndrome ICD-10-CM: G25.81  ICD-9-CM: 333.94  12/27/2017 - Present        Anxiety ICD-10-CM: F41.9  ICD-9-CM: 300.00  12/27/2017 - Present        Essential hypertension ICD-10-CM: I10  ICD-9-CM: 401.9  12/27/2017 - Present        Migraine without status migrainosus, not intractable ICD-10-CM: S26.796  ICD-9-CM: 346.90  12/27/2017 - Present        Type 2 diabetes mellitus without complication, without long-term current use of insulin ([facility] ICD-10-CM: E11.9  ICD-9-CM: 250.00  12/27/2017 - Present        Acquired hypothyroidism ICD-10-CM: E03.9  ICD-9-CM: 244.9  12/27/2017 - Present        Osteoarthritis of left knee ICD-10-CM: M17.12  ICD-9-CM: 715.96  10/26/2009 - Present        S/P total knee replacement using cement ICD-10-CM: Z96.659  ICD-9-CM: V43.65  10/26/2009 - Present        Acute blood loss anemia ICD-10-CM: D62  ICD-9-CM: 285.1  7/28/2009 - Present        Osteoarthritis of right knee ICD-10-CM: M17.11  ICD-9-CM: 715.96  7/27/2009 - Present        S/P total knee replacement using cement ICD-10-CM: Z96.659  ICD-9-CM: V43.65  7/27/2009 - Present               Discharge Condition: [facility] Course: HPI, as per admitting provider:\"Stacia Fermin is a 62 y.o. female with past medical history significant for morbid obesity, hypertension, anxiety, sleep apnea, hypothyroidism presents as a direct transfer from urgent care center after being found with a small bowel obstruction. Patient reports symptoms for started 12 days ago. At that time, patient states symptoms are mild. He did not improve so she went to an urgent care center on Tuesday. Imaging done there showed a small bowel obstruction. Patient was admitted to another facility overnight for management of small bowel obstruction. She did not have NG tube placed at that time and she had a large bowel movement in the morning and was able to tolerate liquid diet. She was discharged following day and went home. However, after discharge she noted that her stomach was becoming more uncomfortable. Symptoms continue to worsen and she decided to go to urgent care this morning. She had reimaging done by CT scan which showed a small bowel obstruction. Patient was then transferred to [address] [place] [address] for further evaluation. Patient denies any fevers, chills, shortness of breath, chest pain. She has had 10 episodes of vomiting since this morning.   She reports no worsening leg swelling or bloody bowel movements. 10 systems reviewed and negative except as noted in HPI. \"    Patient had a partial small bowel obstruction, she had an NG tube placed. Is been followed by surgery, she received pain medication, and IV fluids. She passed flatus, and she had bowel movements, the NG tube was placed, diet was slowly advanced, and patient tolerated well and then she will be discharged home. Does have a complex anterior ventral hernia, she will need to follow-up with surgery in 4 weeks from now. Discharge discharge plan the patient, and she voiced understanding agreement. Discharge time 31 minutes. PE  Gen: NAD  Lungs: CTA b/l  Heart: RRR  Abdomen: large ventral hernia, non tender  Extremities: no calf tenderness    Consults: General Surgery    Disposition: home    Discharge Medications:   Current Discharge Medication List      CONTINUE these medications which have NOT CHANGED    Details   amLODIPine (NORVASC) 10 mg tablet Take 10 mg by mouth daily. tiZANidine (ZANAFLEX) 2 mg tablet TAKE 1 TABLET BY MOUTH TWICE A DAY  Qty: 180 Tab, Refills: 1    Associated Diagnoses: Primary fibromyalgia syndrome      famotidine (PEPCID) 20 mg tablet TAKE 1 TABLET BY MOUTH TWICE A DAY  Qty: 180 Tab, Refills: 1    Associated Diagnoses: Acute gastritis, presence of bleeding unspecified, unspecified gastritis type      budesonide-formoteroL (SYMBICORT) 160-4.5 mcg/actuation HFAA Take 2 Puffs by inhalation two (2) times a day. Qty: 1 Inhaler, Refills: 5    Associated Diagnoses: COPD (chronic obstructive pulmonary disease) with chronic bronchitis (HCC)      celecoxib (CELEBREX) 200 mg capsule Take 1 Cap by mouth daily. Associated Diagnoses: Osteoarthritis of right knee, unspecified osteoarthritis type      DULoxetine (CYMBALTA) 60 mg capsule Take 1 Cap by mouth two (2) times a day.   Qty: 180 Cap, Refills: 3    Associated Diagnoses: Primary fibromyalgia syndrome      glimepiride (AMARYL) 1 mg tablet Take 1 Tab by mouth every morning. Replacing glucophage  Qty: 90 Tab, Refills: 1    Associated Diagnoses: Type 2 diabetes mellitus without complication, without long-term current use of insulin ([facility])      pioglitazone (ACTOS) 30 mg tablet Take 1 Tab by mouth daily. Qty: 90 Tab, Refills: 1    Associated Diagnoses: Type 2 diabetes mellitus without complication, without long-term current use of insulin ([facility])      miscellaneous medical supply misc Diabetic Shoes - use for peripheral neuropathy in large great toe. Qty: 1 Each, Refills: 0    Associated Diagnoses: Type 2 diabetes mellitus without complication, without long-term current use of insulin ([facility])      levothyroxine (SYNTHROID) 75 mcg tablet Take 1 Tab by mouth Daily (before breakfast). Indications: a condition with low thyroid hormone levels  Qty: 90 Tab, Refills: 1    Associated Diagnoses: Acquired hypothyroidism      hydroCHLOROthiazide (HYDRODIURIL) 25 mg tablet Take 1 Tab by mouth daily. Qty: 90 Tab, Refills: 1    Associated Diagnoses: Essential hypertension      omeprazole (PRILOSEC) 40 mg capsule Take 1 Cap by mouth daily. Qty: 90 Cap, Refills: 1    Associated Diagnoses: Acute gastritis, presence of bleeding unspecified, unspecified gastritis type      rOPINIRole (REQUIP) 2 mg tablet Take 1 Tab by mouth nightly. Qty: 90 Tab, Refills: 1    Associated Diagnoses: Restless leg syndrome      MYRBETRIQ 50 mg ER tablet Take 1 Tab by mouth daily. Qty: 30 Tab, Refills: 11    Associated Diagnoses: Bladder spasm      diclofenac (VOLTAREN) 1 % gel Apply  to affected area four (4) times daily.       morphine CR (MS CONTIN) 15 mg CR tablet     Associated Diagnoses: Chronic bilateral low back pain, with sciatica presence unspecified      oxyCODONE-acetaminophen (PERCOCET 10)  mg per tablet     Associated Diagnoses: Chronic bilateral low back pain, with sciatica presence unspecified      fluticasone (FLONASE) 50 mcg/actuation nasal spray 2 Sprays by Both Nostrils route daily. Qty: 3 Bottle, Refills: 3    Associated Diagnoses: Acute maxillary sinusitis, recurrence not specified      naloxone (NARCAN) 4 mg/actuation nasal spray Use 1 spray intranasally into 1 nostril. Use a new Narcan nasal spray for subsequent doses and administer into alternating nostrils. May repeat every 2 to 3 minutes as needed. Qty: 1 Each, Refills: 0    Associated Diagnoses: Chronic bilateral low back pain, with sciatica presence unspecified      calcium-cholecalciferol, d3, (CALCIUM 600 + D) 600-125 mg-unit tab Take  by mouth. Associated Diagnoses: Primary osteoarthritis of left knee      gabapentin (NEURONTIN) 600 mg tablet take 2 Tabs by mouth nightly. Medication not on pharmacy formulary - Patient to bring day of surgery. MULTIVITAMINS (MULTIVITAMIN PO) take  by mouth every evening.              Activity: Activity as tolerated  Diet: Cardiac Diet      Follow-up Appointments   Procedures    FOLLOW UP VISIT Appointment in: 3 - 5 Days pcp     pcp     Standing Status:   Standing     Number of Occurrences:   1     Order Specific Question:   Appointment in     Answer:   3 - 5 Days    FOLLOW UP VISIT Appointment in: One Month surgery     surgery     Standing Status:   Standing     Number of Occurrences:   1     Standing Expiration Date:   7/3/2020     Order Specific Question:   Appointment in     Answer:   One Month       Signed By: Pattie Madison MD     July 2, 2020

## 2020-07-02 NOTE — PROGRESS NOTES
H&P/Consult Note/Progress Note/Office Note:   Stef Kirkland  MRN: [id number]  :1960  Age:59 y.o.    HPI: Stef Kirkland is a 62 y.o. female who is seen for SBO. She developed nausea/vomiting with abdominal pain symptoms 12 days ago, she has been in and out of [facility], was just discharged two days ago, but her symptoms returned today with nausea, vomiting. Her last BM and flatus was about 2 days ago. Her outside CT reports SBO with possible incarcerated hernia. She is extremely obese with BMI 63. She had numerous abdominal surgeries including open gastric bypass, open rashid. Her other medical issues are reviewed as below. 2020: Awake in bed, no complaints. Abd obese but soft. AF, VSS. +flatus. No BM. NG output clear with brown tinge--550mL out last 24h.   2020 Awake, no complaints. Abd soft. AF, VSS. Awaiting SBFT. 1L NGT output/24h  2020 Awake in bed, no complaints. Denies n/v. +flatus and large BM after SBFT. SBFT with contrast in colon. 2020 tolerating clear liquids, requesting more to eat. +BM +flatus.      Past Medical History:   Diagnosis Date    Acquired hypothyroidism 2017    Anxiety     Arthritis     Depression      & ANXIETY    Fibromyalgia     Hypertension 1977    Obesity, morbid ([facility]     Psychiatric disorder     Anxiety disorder    Psychiatric disorder     depression    Sleep apnea     uses c-pap, non compliant    Type 2 diabetes mellitus without complication, without long-term current use of insulin ([facility] 2017    Unspecified adverse effect of anesthesia     after gastric bypass, woke up on vent     Past Surgical History:   Procedure Laterality Date    HX APPENDECTOMY  1973    HX CHOLECYSTECTOMY      HX DILATION AND CURETTAGE      X 2    HX GASTRIC BYPASS      Dr. Abdirahman Lamb    rt knee has screw in knee tendons streched    HX ORTHOPAEDIC  July 2009    Right knee replacement    RI UNS ORAL SURG PROC BY REPORT  2000     Current Facility-Administered Medications   Medication Dose Route Frequency    phenol throat spray (CHLORASEPTIC) 1 Spray  1 [place] Oral PRN    budesonide-formoteroL (SYMBICORT) 160-4.5 mcg/actuation HFA inhaler 2 Puff  2 Puff Inhalation BID RT    fluticasone propionate (FLONASE) 50 mcg/actuation nasal spray 2 Spray  2 Spray Both Nostrils DAILY    sodium chloride (NS) flush 5-40 mL  5-40 mL IntraVENous Q8H    sodium chloride (NS) flush 5-40 mL  5-40 mL IntraVENous PRN    naloxone (NARCAN) injection 0.4 mg  0.4 mg IntraVENous PRN    morphine 10 mg/ml injection 5 mg  5 mg IntraVENous Q4H PRN    ondansetron (ZOFRAN) injection 4 mg  4 mg IntraVENous Q4H PRN    magnesium hydroxide (MILK OF MAGNESIA) 400 mg/5 mL oral suspension 30 mL  30 mL Oral DAILY PRN    heparin (porcine) injection 5,000 Units  5,000 Units SubCUTAneous Q8H    hydrALAZINE (APRESOLINE) 20 mg/mL injection 20 mg  20 mg IntraVENous Q6H PRN    [START ON 7/5/2020] levothyroxine (SYNTHROID) injection 56 mcg  56 mcg IntraVENous DAILY     Codeine and Sulfa (sulfonamide antibiotics)  Social History     Socioeconomic History    Marital status:      Spouse name: Not on file    Number of children: Not on file    Years of education: Not on file    Highest education level: Not on file   Tobacco Use    Smoking status: Never Smoker    Smokeless tobacco: Never Used   Substance and Sexual Activity    Alcohol use: No    Drug use: No     Social History     Tobacco Use   Smoking Status Never Smoker   Smokeless Tobacco Never Used     Family History   Problem Relation Age of Onset    Post-op Nausea/Vomiting Mother     COPD Mother     Heart Disease Mother     Heart Attack Mother    [facility] Arthritis-rheumatoid Mother     Diabetes Mother     Hypertension Mother     Heart Disease Father     Cancer Father     Heart Attack Father     Hypertension Father     Elevated Lipids Father     Hypertension Sister     Heart Attack Brother     Diabetes Maternal Grandfather     Ovarian Cancer Paternal Grandmother      ROS: The patient has no difficulty with chest pain or shortness of breath. No fever or chills. Comprehensive review of systems was otherwise unremarkable except as noted above. Physical Exam:   Visit Vitals  /81 (BP 1 Location: Right arm, BP Patient Position: Sitting)   Pulse (!) 102   Temp 98.2 °F (36.8 °C)   Resp 20   Ht 5' 2\" (1.575 m)   Wt (!) 377 lb 12.8 oz (171.4 kg)   SpO2 93%   BMI 69.10 kg/m²     Vitals:    07/02/20 0034 07/02/20 0513 07/02/20 0754 07/02/20 1038   BP: (!) 184/95 (!) 190/97 189/81 161/81   Pulse: 74 76 (!) 103 (!) 102   Resp: 18 18 22 20   Temp: 97.8 °F (36.6 °C) 98.3 °F (36.8 °C) 98.3 °F (36.8 °C) 98.2 °F (36.8 °C)   SpO2: 96% 93% 94% 93%   Weight:       Height:         No intake/output data recorded. 06/30 1901 - 07/02 0700  In: -   Out: 2100     Constitutional: Alert, oriented, cooperative patient in no acute distress; appears stated age    Eyes:Sclera are clear. EOMs intact  ENMT: no external lesions gross hearing normal; no obvious neck masses, no ear or lip lesions, nares normal  CV: RRR. Normal perfusion  Resp: No JVD. Breathing is  non-labored; no audible wheezing. GI: soft, obese. A large left abdominal wall hernia, which is softer per her. Mild tenderness at low abdomen. No peritoneal signs. Musculoskeletal: unremarkable with normal function. No embolic signs or cyanosis.    Neuro:  Oriented; moves all 4; no focal deficits  Psychiatric: normal affect and mood, no memory impairment    Recent vitals (if inpt):  Patient Vitals for the past 24 hrs:   BP Temp Pulse Resp SpO2   07/02/20 1038 161/81 98.2 °F (36.8 °C) (!) 102 20 93 %   07/02/20 0754 189/81 98.3 °F (36.8 °C) (!) 103 22 94 %   07/02/20 0513 (!) 190/97 98.3 °F (36.8 °C) 76 18 93 %   07/02/20 0034 (!) 184/95 97.8 °F (36.6 °C) 74 18 96 %   07/01/20 2053 151/78 98.2 °F (36.8 °C) 79 17 91 %   07/01/20 1943     95 %   07/01/20 1617 168/53 97.8 °F (36.6 °C) 79 16 95 %       Labs:  Recent Labs     07/02/20  0529  06/30/20  0515   WBC 9.3   < > 8.3   HGB 14.5   < > 13.7      < > 311      < > 140   K 3.9   < > 3.6      < > 100   CO2 17*   < > 28   BUN 14   < > 12   CREA 0.68   < > 0.66   *   < > 92   TBILI  --   --  0.6   ALT  --   --  22   AP  --   --  120    < > = values in this interval not displayed. Lab Results   Component Value Date/Time    WBC 9.3 07/02/2020 05:29 AM    HGB 14.5 07/02/2020 05:29 AM    PLATELET 330 02/99/1189 05:29 AM    Sodium 137 07/02/2020 05:29 AM    Potassium 3.9 07/02/2020 05:29 AM    Chloride 101 07/02/2020 05:29 AM    CO2 17 (L) 07/02/2020 05:29 AM    BUN 14 07/02/2020 05:29 AM    Creatinine 0.68 07/02/2020 05:29 AM    Glucose 116 (H) 07/02/2020 05:29 AM    INR 1.2 10/29/2009 05:05 AM    aPTT 26.4 10/21/2009 10:00 AM    Bilirubin, total 0.6 06/30/2020 05:15 AM    ALT (SGPT) 22 06/30/2020 05:15 AM    Alk. phosphatase 120 06/30/2020 05:15 AM       CT Results  (Last 48 hours)    None        chest X-ray  XR Results (most recent):  Results from [place] encounter on 06/27/20   XR ABD (KUB)    Narrative KUB    HISTORY: Follow-up small bowel obstruction. Abdominal pain has improved. AP views of the abdomen were obtained in the supine position. COMPARISON: 07/01/2020. FINDINGS: The oral contrast within the colon has since been evacuated. Nasogastric tube is been removed. There are a few moderate to significantly  dilated small bowel loops which have progressed. Assessment for free  intraperitoneal air is suboptimal on this supine technique. Surgical clips  overlie the right upper quadrant. Impression IMPRESSION: Interval progression of dilated small bowel loops. I reviewed recent labs, recent radiologic studies, and pertinent records including other doctor notes if needed.     I independently reviewed radiology images for studies I described above or studies I have ordered.    Admission date (for inpatients): 6/27/2020   * No surgery found *  * No surgery found *    ASSESSMENT/PLAN:  Problem List  Date Reviewed: 2/27/2020          Codes Class Noted    Hypokalemia ICD-10-CM: E87.6  ICD-9-CM: 276.8  6/28/2020        * (Principal) Small bowel obstruction ([facility] ICD-10-CM: K56.609  ICD-9-CM: 560.9  6/27/2020        FORREST (obstructive sleep apnea) ICD-10-CM: G47.33  ICD-9-CM: 327.23  6/27/2020        Chronic bilateral low back pain ICD-10-CM: M54.5, G89.29  ICD-9-CM: 724.2, 338.29  6/27/2020        Type 2 diabetes with nephropathy ([facility] ICD-10-CM: E11.21  ICD-9-CM: 250.40, 583.81  2/27/2020        Depression, major, recurrent, mild (HCC) ICD-10-CM: F33.0  ICD-9-CM: 296.31  2/27/2020        Shortness of breath on exertion ICD-10-CM: R06.02  ICD-9-CM: 786.05  11/25/2019        Morbid obesity with BMI of 70 and over, adult [facility]) ICD-10-CM: E66.01, Z68.45  ICD-9-CM: 278.01, V85.45  12/12/2018        Poor tolerance for ambulation ICD-10-CM: Z78.9  ICD-9-CM: V49.89  10/11/2018        Type 2 diabetes mellitus with diabetic neuropathy ([facility] ICD-10-CM: E11.40  ICD-9-CM: 250.60, 357.2  9/24/2018        Obesity, morbid ([facility] ICD-10-CM: E66.01  ICD-9-CM: 278.01  12/27/2017        Restless legs syndrome ICD-10-CM: G25.81  ICD-9-CM: 333.94  12/27/2017        Anxiety ICD-10-CM: F41.9  ICD-9-CM: 300.00  12/27/2017        Essential hypertension ICD-10-CM: I10  ICD-9-CM: 401.9  12/27/2017        Migraine without status migrainosus, not intractable ICD-10-CM: R84.192  ICD-9-CM: 346.90  12/27/2017        Type 2 diabetes mellitus without complication, without long-term current use of insulin (HCC) ICD-10-CM: E11.9  ICD-9-CM: 250.00  12/27/2017        Acquired hypothyroidism ICD-10-CM: E03.9  ICD-9-CM: 244.9  12/27/2017        Osteoarthritis of left knee ICD-10-CM: M17.12  ICD-9-CM: 715.96  10/26/2009        S/P total knee replacement using cement ICD-10-CM: U87.608  ICD-9-CM: V43.65  10/26/2009        Acute blood loss anemia ICD-10-CM: D62  ICD-9-CM: 285.1  7/28/2009        Osteoarthritis of right knee ICD-10-CM: M17.11  ICD-9-CM: 715.96  7/27/2009        S/P total knee replacement using cement ICD-10-CM: Z96.659  ICD-9-CM: V43.65  7/27/2009            Principal Problem:    Small bowel obstruction ([facility] (6/27/2020)    Active Problems:    Obesity, morbid ([address] (12/27/2017)      Essential hypertension (12/27/2017)      Type 2 diabetes mellitus with diabetic neuropathy ([facility] [facility] (9/24/2018)      FORREST (obstructive sleep apnea) (6/27/2020)      Chronic bilateral low back pain (6/27/2020)      Hypokalemia (6/28/2020)       Care management per primary team  GI soft diet  If tolerated GI soft diet OK for discharge per surgery  Instructed patient to eat easy digestible foods       Carson Delarosa NP

## 2020-07-03 ENCOUNTER — APPOINTMENT (OUTPATIENT)
Dept: GENERAL RADIOLOGY | Age: 60
DRG: 394 | End: 2020-07-03
Attending: INTERNAL MEDICINE
Payer: MEDICARE

## 2020-07-03 VITALS
DIASTOLIC BLOOD PRESSURE: 85 MMHG | SYSTOLIC BLOOD PRESSURE: 146 MMHG | TEMPERATURE: 98.1 F | WEIGHT: 293 LBS | RESPIRATION RATE: 18 BRPM | HEART RATE: 76 BPM | BODY MASS INDEX: 53.92 KG/M2 | OXYGEN SATURATION: 94 % | HEIGHT: 62 IN

## 2020-07-03 LAB — GLUCOSE BLD STRIP.AUTO-MCNC: 128 MG/DL (ref 65–100)

## 2020-07-03 PROCEDURE — 74011250637 HC RX REV CODE- 250/637: Performed by: INTERNAL MEDICINE

## 2020-07-03 PROCEDURE — 77010033678 HC OXYGEN DAILY

## 2020-07-03 PROCEDURE — 74011250636 HC RX REV CODE- 250/636: Performed by: INTERNAL MEDICINE

## 2020-07-03 PROCEDURE — 94760 N-INVAS EAR/PLS OXIMETRY 1: CPT

## 2020-07-03 PROCEDURE — 82962 GLUCOSE BLOOD TEST: CPT

## 2020-07-03 PROCEDURE — 94640 AIRWAY INHALATION TREATMENT: CPT

## 2020-07-03 PROCEDURE — 74018 RADEX ABDOMEN 1 VIEW: CPT

## 2020-07-03 RX ADMIN — FLUTICASONE PROPIONATE 2 SPRAY: 50 SPRAY, METERED NASAL at 09:02

## 2020-07-03 RX ADMIN — HEPARIN SODIUM 5000 UNITS: 5000 INJECTION INTRAVENOUS; SUBCUTANEOUS at 05:29

## 2020-07-03 RX ADMIN — GABAPENTIN 400 MG: 400 CAPSULE ORAL at 16:00

## 2020-07-03 RX ADMIN — HEPARIN SODIUM 5000 UNITS: 5000 INJECTION INTRAVENOUS; SUBCUTANEOUS at 13:41

## 2020-07-03 RX ADMIN — BUDESONIDE AND FORMOTEROL FUMARATE DIHYDRATE 2 PUFF: 160; 4.5 AEROSOL RESPIRATORY (INHALATION) at 07:34

## 2020-07-03 RX ADMIN — GABAPENTIN 400 MG: 400 CAPSULE ORAL at 09:01

## 2020-07-03 RX ADMIN — Medication 10 ML: at 05:30

## 2020-07-03 RX ADMIN — MORPHINE SULFATE 5 MG: 10 INJECTION, SOLUTION INTRAMUSCULAR; INTRAVENOUS at 13:51

## 2020-07-03 RX ADMIN — MORPHINE SULFATE 5 MG: 10 INJECTION, SOLUTION INTRAMUSCULAR; INTRAVENOUS at 03:14

## 2020-07-03 RX ADMIN — MORPHINE SULFATE 5 MG: 10 INJECTION, SOLUTION INTRAMUSCULAR; INTRAVENOUS at 08:05

## 2020-07-03 RX ADMIN — Medication 10 ML: at 14:00

## 2020-07-03 NOTE — PROGRESS NOTES
H&P/Consult Note/Progress Note/Office Note:   Mimi Faust  MRN: [id number]  :1960  Age:59 y.o.    HPI: Mimi Faust is a 62 y.o. female who is seen for SBO. She developed nausea/vomiting with abdominal pain symptoms 12 days ago, she has been in and out of [facility], was just discharged two days ago, but her symptoms returned today with nausea, vomiting. Her last BM and flatus was about 2 days ago. Her outside CT reports SBO with possible incarcerated hernia. She is extremely obese with BMI 63. She had numerous abdominal surgeries including open gastric bypass, open rashid. Her other medical issues are reviewed as below. 2020: Awake in bed, no complaints. Abd obese but soft. AF, VSS. +flatus. No BM. NG output clear with brown tinge--550mL out last 24h.   2020 Awake, no complaints. Abd soft. AF, VSS. Awaiting SBFT. 1L NGT output/24h  2020 Awake in bed, no complaints. Denies n/v. +flatus and large BM after SBFT. SBFT with contrast in colon. 2020 tolerating clear liquids, requesting more to eat. +BM +flatus. 2020  Tolerating GI soft diet. One episode of nausea yesterday evening.  +BM, +flatus    Past Medical History:   Diagnosis Date    Acquired hypothyroidism 2017    Anxiety     Arthritis     Depression      & ANXIETY    Fibromyalgia     Hypertension 1977    Obesity, morbid ([address]     Psychiatric disorder 2004    Anxiety disorder    Psychiatric disorder 2004    depression    Sleep apnea     uses c-pap, non compliant    Type 2 diabetes mellitus without complication, without long-term current use of insulin ([address] 2017    Unspecified adverse effect of anesthesia     after gastric bypass, woke up on vent     Past Surgical History:   Procedure Laterality Date    HX APPENDECTOMY  1973    HX CHOLECYSTECTOMY      HX DILATION AND CURETTAGE      X 2    HX GASTRIC BYPASS      Dr. Javier Mariano HX ORTHOPAEDIC  1975    rt knee has screw in knee tendons streched    HX ORTHOPAEDIC  July 2009    Right knee replacement    AR UNS ORAL SURG PROC BY REPORT  2000     Current Facility-Administered Medications   Medication Dose Route Frequency    gabapentin (NEURONTIN) capsule 400 mg  400 mg Oral TID    phenol throat spray (CHLORASEPTIC) 1 Spray  1 [place] Oral PRN    budesonide-formoteroL (SYMBICORT) 160-4.5 mcg/actuation HFA inhaler 2 Puff  2 Puff Inhalation BID RT    fluticasone propionate (FLONASE) 50 mcg/actuation nasal spray 2 Spray  2 Spray Both Nostrils DAILY    sodium chloride (NS) flush 5-40 mL  5-40 mL IntraVENous Q8H    sodium chloride (NS) flush 5-40 mL  5-40 mL IntraVENous PRN    naloxone (NARCAN) injection 0.4 mg  0.4 mg IntraVENous PRN    morphine 10 mg/ml injection 5 mg  5 mg IntraVENous Q4H PRN    ondansetron (ZOFRAN) injection 4 mg  4 mg IntraVENous Q4H PRN    magnesium hydroxide (MILK OF MAGNESIA) 400 mg/5 mL oral suspension 30 mL  30 mL Oral DAILY PRN    heparin (porcine) injection 5,000 Units  5,000 Units SubCUTAneous Q8H    hydrALAZINE (APRESOLINE) 20 mg/mL injection 20 mg  20 mg IntraVENous Q6H PRN    [START ON 7/5/2020] levothyroxine (SYNTHROID) injection 56 mcg  56 mcg IntraVENous DAILY     Codeine and Sulfa (sulfonamide antibiotics)  Social History     Socioeconomic History    Marital status:      Spouse name: Not on file    Number of children: Not on file    Years of education: Not on file    Highest education level: Not on file   Tobacco Use    Smoking status: Never Smoker    Smokeless tobacco: Never Used   Substance and Sexual Activity    Alcohol use: No    Drug use: No     Social History     Tobacco Use   Smoking Status Never Smoker   Smokeless Tobacco Never Used     Family History   Problem Relation Age of Onset    Post-op Nausea/Vomiting Mother     COPD Mother     Heart Disease Mother     Heart Attack Mother     Arthritis-rheumatoid Mother     Diabetes Mother     Hypertension Mother     Heart Disease Father     Cancer Father     Heart Attack Father     Hypertension Father     Elevated Lipids Father     Hypertension Sister     Heart Attack Brother     Diabetes Maternal Grandfather     Ovarian Cancer Paternal Grandmother      ROS: The patient has no difficulty with chest pain or shortness of breath. No fever or chills. Comprehensive review of systems was otherwise unremarkable except as noted above. Physical Exam:   Visit Vitals  /69 (BP 1 Location: Right arm, BP Patient Position: At rest)   Pulse 87   Temp 98.9 °F (37.2 °C)   Resp 18   Ht 5' 2\" (1.575 m)   Wt (!) 377 lb 12.8 oz (171.4 kg)   SpO2 95%   BMI 69.10 kg/m²     Vitals:    07/02/20 2324 07/03/20 0311 07/03/20 0734 07/03/20 0743   BP: 147/85 163/79  132/69   Pulse: 83 71  87   Resp: 18 18  18   Temp: 98 °F (36.7 °C) 97.6 °F (36.4 °C)  98.9 °F (37.2 °C)   SpO2: 93% 96% 94% 95%   Weight:       Height:         No intake/output data recorded. 07/01 1901 - 07/03 0700  In: 230 [P.O.:230]  Out: -     Constitutional: Alert, oriented, cooperative patient in no acute distress; appears stated age    Eyes:Sclera are clear. EOMs intact  ENMT: no external lesions gross hearing normal; no obvious neck masses, no ear or lip lesions, nares normal  CV: RRR. Normal perfusion  Resp: No JVD. Breathing is  non-labored; no audible wheezing. GI: soft, obese. A large left abdominal wall hernia, which is softer per her. Mild tenderness at low abdomen. No peritoneal signs. Musculoskeletal: unremarkable with normal function. No embolic signs or cyanosis.    Neuro:  Oriented; moves all 4; no focal deficits  Psychiatric: normal affect and mood, no memory impairment    Recent vitals (if inpt):  Patient Vitals for the past 24 hrs:   BP Temp Pulse Resp SpO2   07/03/20 0743 132/69 98.9 °F (37.2 °C) 87 18 95 %   07/03/20 0734     94 %   07/03/20 0311 163/79 97.6 °F (36.4 °C) 71 18 96 %   07/02/20 8554 147/85 98 °F (36.7 °C) 83 18 93 %   07/02/20 2035 (!) 176/95 98 °F (36.7 °C) 81 20 93 %   07/02/20 2014     93 %   07/02/20 1501 197/83 98.2 °F (36.8 °C) 84 20 92 %   07/02/20 1038 161/81 98.2 °F (36.8 °C) (!) 102 20 93 %       Labs:  Recent Labs     07/02/20  0529   WBC 9.3   HGB 14.5         K 3.9      CO2 17*   BUN 14   CREA 0.68   *       Lab Results   Component Value Date/Time    WBC 9.3 07/02/2020 05:29 AM    HGB 14.5 07/02/2020 05:29 AM    PLATELET 096 23/80/9989 05:29 AM    Sodium 137 07/02/2020 05:29 AM    Potassium 3.9 07/02/2020 05:29 AM    Chloride 101 07/02/2020 05:29 AM    CO2 17 (L) 07/02/2020 05:29 AM    BUN 14 07/02/2020 05:29 AM    Creatinine 0.68 07/02/2020 05:29 AM    Glucose 116 (H) 07/02/2020 05:29 AM    INR 1.2 10/29/2009 05:05 AM    aPTT 26.4 10/21/2009 10:00 AM    Bilirubin, total 0.6 06/30/2020 05:15 AM    ALT (SGPT) 22 06/30/2020 05:15 AM    Alk. phosphatase 120 06/30/2020 05:15 AM       CT Results  (Last 48 hours)    None        chest X-ray  XR Results (most recent):  Results from [place] encounter on 06/27/20   XR ABD (KUB)    Narrative KUB    HISTORY: Follow-up small bowel obstruction. Abdominal pain has improved. AP views of the abdomen were obtained in the supine position. COMPARISON: 07/01/2020. FINDINGS: The oral contrast within the colon has since been evacuated. Nasogastric tube is been removed. There are a few moderate to significantly  dilated small bowel loops which have progressed. Assessment for free  intraperitoneal air is suboptimal on this supine technique. Surgical clips  overlie the right upper quadrant. Impression IMPRESSION: Interval progression of dilated small bowel loops. I reviewed recent labs, recent radiologic studies, and pertinent records including other doctor notes if needed. I independently reviewed radiology images for studies I described above or studies I have ordered.    Admission date (for inpatients): 6/27/2020   * No surgery found *  * No surgery found *    ASSESSMENT/PLAN:  Problem List  Date Reviewed: 2/27/2020          Codes Class Noted    Hypokalemia ICD-10-CM: E87.6  ICD-9-CM: 276.8  6/28/2020        * (Principal) Small bowel obstruction ([facility] ICD-10-CM: G37.795  ICD-9-CM: 560.9  6/27/2020        FORREST (obstructive sleep apnea) ICD-10-CM: G47.33  ICD-9-CM: 327.23  6/27/2020        Chronic bilateral low back pain ICD-10-CM: M54.5, G89.29  ICD-9-CM: 724.2, 338.29  6/27/2020        Type 2 diabetes with nephropathy ([facility] ICD-10-CM: E11.21  ICD-9-CM: 250.40, 583.81  2/27/2020        Depression, major, recurrent, mild (HCC) ICD-10-CM: F33.0  ICD-9-CM: 296.31  2/27/2020        Shortness of breath on exertion ICD-10-CM: R06.02  ICD-9-CM: 786.05  11/25/2019        Morbid obesity with BMI of 70 and over, adult [facility]) ICD-10-CM: E66.01, Z68.45  ICD-9-CM: 278.01, V85.45  12/12/2018        Poor tolerance for ambulation ICD-10-CM: Z78.9  ICD-9-CM: V49.89  10/11/2018        Type 2 diabetes mellitus with diabetic neuropathy ([facility] ICD-10-CM: E11.40  ICD-9-CM: 250.60, 357.2  9/24/2018        Obesity, morbid ([facility] ICD-10-CM: E66.01  ICD-9-CM: 278.01  12/27/2017        Restless legs syndrome ICD-10-CM: G25.81  ICD-9-CM: 333.94  12/27/2017        Anxiety ICD-10-CM: F41.9  ICD-9-CM: 300.00  12/27/2017        Essential hypertension ICD-10-CM: I10  ICD-9-CM: 401.9  12/27/2017        Migraine without status migrainosus, not intractable ICD-10-CM: O22.379  ICD-9-CM: 346.90  12/27/2017        Type 2 diabetes mellitus without complication, without long-term current use of insulin ([facility] ICD-10-CM: E11.9  ICD-9-CM: 250.00  12/27/2017        Acquired hypothyroidism ICD-10-CM: E03.9  ICD-9-CM: 244.9  12/27/2017        Osteoarthritis of left knee ICD-10-CM: M17.12  ICD-9-CM: 715.96  10/26/2009        S/P total knee replacement using cement ICD-10-CM: Z96.659  ICD-9-CM: V43.65  10/26/2009        Acute blood loss anemia ICD-10-CM: D62  ICD-9-CM: 285.1  7/28/2009        Osteoarthritis of right knee ICD-10-CM: M17.11  ICD-9-CM: 715.96  7/27/2009        S/P total knee replacement using cement ICD-10-CM: Z96.659  ICD-9-CM: V43.65  7/27/2009            Principal Problem:    Small bowel obstruction ([facility] (6/27/2020)    Active Problems:    Obesity, morbid ([facility] (12/27/2017)      Essential hypertension (12/27/2017)      Type 2 diabetes mellitus with diabetic neuropathy ([facility] (9/24/2018)      FORREST (obstructive sleep apnea) (6/27/2020)      Chronic bilateral low back pain (6/27/2020)      Hypokalemia (6/28/2020)       Care management per primary team  GI soft diet  OK for discharge.   Instructed on low residual diet      Batool Everett, NP

## 2020-07-03 NOTE — DISCHARGE SUMMARY
Lior Woods MD   Physician   Internal Medicine   Discharge Summary   Signed   Date of Service:  07/02/20 6040                    []Hide copied text    []Suzanne for details        Discharge Summary      Patient: Joe Ley MRN: [id number]  SSN: [id number]    YOB: 1960  Age: 62 y.o.   Sex: female       Admit Date: 6/27/2020     Discharge Date: 7/2/2020       Admission Diagnoses: Small bowel obstruction ([facility]) [M22.295]     Discharge Diagnoses:              Problem List as of 7/2/2020 Date Reviewed: 2/27/2020           Codes Class Noted - Resolved     Hypokalemia ICD-10-CM: E87.6  ICD-9-CM: 276.8   6/28/2020 - Present           * (Principal) Small bowel obstruction ([facility]) ICD-10-CM: W63.679  ICD-9-CM: 560.9   6/27/2020 - Present           FORREST (obstructive sleep apnea) ICD-10-CM: G47.33  ICD-9-CM: 327.23   6/27/2020 - Present           Chronic bilateral low back pain ICD-10-CM: M54.5, G89.29  ICD-9-CM: 724.2, 338.29   6/27/2020 - Present           Type 2 diabetes with nephropathy ([facility]) ICD-10-CM: E11.21  ICD-9-CM: 250.40, 583.81   2/27/2020 - Present           Depression, major, recurrent, mild (HCC) ICD-10-CM: F33.0  ICD-9-CM: 296.31   2/27/2020 - Present           Shortness of breath on exertion ICD-10-CM: R06.02  ICD-9-CM: 786.05   11/25/2019 - Present           Morbid obesity with BMI of 70 and over, adult [facility]) ICD-10-CM: E66.01, Z68.45  ICD-9-CM: 278.01, V85.45   12/12/2018 - Present           Poor tolerance for ambulation ICD-10-CM: Z78.9  ICD-9-CM: V49.89   10/11/2018 - Present           Type 2 diabetes mellitus with diabetic neuropathy ([facility]) ICD-10-CM: E11.40  ICD-9-CM: 250.60, 357.2   9/24/2018 - Present           Obesity, morbid ([facility]) ICD-10-CM: E66.01  ICD-9-CM: 278.01   12/27/2017 - Present           Restless legs syndrome ICD-10-CM: G25.81  ICD-9-CM: 333.94   12/27/2017 - Present           Anxiety ICD-10-CM: F41.9  ICD-9-CM: 300.00   12/27/2017 - Present           Essential hypertension ICD-10-CM: I10  ICD-9-CM: 401. 9   12/27/2017 - Present           Migraine without status migrainosus, not intractable ICD-10-CM: K84.358  ICD-9-CM: 346.90   12/27/2017 - Present           Type 2 diabetes mellitus without complication, without long-term current use of insulin (HCC) ICD-10-CM: E11.9  ICD-9-CM: 250.00   12/27/2017 - Present           Acquired hypothyroidism ICD-10-CM: E03.9  ICD-9-CM: 244. 9   12/27/2017 - Present           Osteoarthritis of left knee ICD-10-CM: M17.12  ICD-9-CM: 715.96   10/26/2009 - Present           S/P total knee replacement using cement ICD-10-CM: Z96.659  ICD-9-CM: V43.65   10/26/2009 - Present           Acute blood loss anemia ICD-10-CM: D62  ICD-9-CM: 285. 1   7/28/2009 - Present           Osteoarthritis of right knee ICD-10-CM: M17.11  ICD-9-CM: 715.96   7/27/2009 - Present           S/P total knee replacement using cement ICD-10-CM: Z96.659  ICD-9-CM: V43.65   7/27/2009 - Present                   Discharge Condition: [facility]: HPI, as per admitting provider:\"Stacia Snell a 62 y.o. female with past medical history significant for morbid obesity, hypertension, anxiety, sleep apnea, hypothyroidism presents as a direct transfer from urgent care center after being found with a small bowel obstruction.  Patient reports symptoms for started 12 days ago. Ka[facility] Lobo that time, patient states symptoms are mild.  He did not improve so she went to an urgent care center on Tuesday. [facility]ables done there showed a small bowel obstruction.  Patient was admitted to another facility overnight for management of small bowel obstruction.  She did not have NG tube placed at that time and she had a large bowel movement in the morning and was able to tolerate liquid diet.  She was discharged following day and went home. [facility], after discharge she noted that her stomach was becoming more uncomfortable.  Symptoms continue to worsen and she decided to go to urgent care this morning.  She had reimaging done by CT scan which showed a small bowel obstruction.  Patient was then transferred to [address] [place] [address] for further evaluation. Patient denies any fevers, chills, shortness of breath, chest pain.  She has had 10 episodes of vomiting since this morning.  She reports no worsening leg swelling or bloody bowel movements. 10 systems reviewed and negative except as noted in HPI. \"     Patient had a partial small bowel obstruction, she had an NG tube placed. Is been followed by surgery, she received pain medication, and IV fluids. She passed flatus, and she had bowel movements, the NG tube was placed, diet was slowly advanced, and patient tolerated well and then she will be discharged home. Does have a complex anterior ventral hernia, she will need to follow-up with surgery in 4 weeks from now. Discharge discharge plan the patient, and she voiced understanding agreement. Discharge time 31 minutes. Addendum: last evening patient stated she was not feeling comfortable going home because of abdominal discomfort, discharge was postponed for today.  No acute issues today, tolerated well diet, discharged home     PE  Gen: NAD  Lungs: CTA b/l  Heart: RRR  Abdomen: large ventral hernia, non tender  Extremities: no calf tenderness     Consults: General Surgery     Disposition: home     Discharge Medications:        Current Discharge Medication List            CONTINUE these medications which have NOT CHANGED     Details   amLODIPine (NORVASC) 10 mg tablet Take 10 mg by mouth daily.       tiZANidine (ZANAFLEX) 2 mg tablet TAKE 1 TABLET BY MOUTH TWICE A DAY  Qty: 180 Tab, Refills: 1     Associated Diagnoses: Primary fibromyalgia syndrome       famotidine (PEPCID) 20 mg tablet TAKE 1 TABLET BY MOUTH TWICE A DAY  Qty: 180 Tab, Refills: 1     Associated Diagnoses: Acute gastritis, presence of bleeding unspecified, unspecified gastritis type       budesonide-formoteroL (SYMBICORT) 160-4.5 mcg/actuation HFAA Take 2 Puffs by inhalation two (2) times a day. Qty: 1 Inhaler, Refills: 5     Associated Diagnoses: COPD (chronic obstructive pulmonary disease) with chronic bronchitis ([facility])       celecoxib (CELEBREX) 200 mg capsule Take 1 Cap by mouth daily.     Associated Diagnoses: Osteoarthritis of right knee, unspecified osteoarthritis type       DULoxetine (CYMBALTA) 60 mg capsule Take 1 Cap by mouth two (2) times a day. Qty: 180 Cap, Refills: 3     Associated Diagnoses: Primary fibromyalgia syndrome       glimepiride (AMARYL) 1 mg tablet Take 1 Tab by mouth every morning. Replacing glucophage  Qty: 90 Tab, Refills: 1     Associated Diagnoses: Type 2 diabetes mellitus without complication, without long-term current use of insulin ([facility])       pioglitazone (ACTOS) 30 mg tablet Take 1 Tab by mouth daily. Qty: 90 Tab, Refills: 1     Associated Diagnoses: Type 2 diabetes mellitus without complication, without long-term current use of insulin ([facility])       miscellaneous medical supply misc Diabetic Shoes - use for peripheral neuropathy in large great toe. Qty: 1 Each, Refills: 0     Associated Diagnoses: Type 2 diabetes mellitus without complication, without long-term current use of insulin ([facility])       levothyroxine (SYNTHROID) 75 mcg tablet Take 1 Tab by mouth Daily (before breakfast). Indications: a condition with low thyroid hormone levels  Qty: 90 Tab, Refills: 1     Associated Diagnoses: Acquired hypothyroidism       hydroCHLOROthiazide (HYDRODIURIL) 25 mg tablet Take 1 Tab by mouth daily. Qty: 90 Tab, Refills: 1     Associated Diagnoses: Essential hypertension       omeprazole (PRILOSEC) 40 mg capsule Take 1 Cap by mouth daily. Qty: 90 Cap, Refills: 1     Associated Diagnoses: Acute gastritis, presence of bleeding unspecified, unspecified gastritis type       rOPINIRole (REQUIP) 2 mg tablet Take 1 Tab by mouth nightly.   Qty: 90 Tab, Refills: 1     Associated Diagnoses: Restless leg syndrome       MYRBETRIQ 50 mg ER tablet Take 1 Tab by mouth daily. Qty: 30 Tab, Refills: 11     Associated Diagnoses: Bladder spasm       diclofenac (VOLTAREN) 1 % gel Apply  to affected area four (4) times daily.       morphine CR (MS CONTIN) 15 mg CR tablet       Associated Diagnoses: Chronic bilateral low back pain, with sciatica presence unspecified       oxyCODONE-acetaminophen (PERCOCET 10)  mg per tablet       Associated Diagnoses: Chronic bilateral low back pain, with sciatica presence unspecified       fluticasone (FLONASE) 50 mcg/actuation nasal spray 2 Sprays by Both Nostrils route daily. Qty: 3 Bottle, Refills: 3     Associated Diagnoses: Acute maxillary sinusitis, recurrence not specified       naloxone (NARCAN) 4 mg/actuation nasal spray Use 1 spray intranasally into 1 nostril. Use a new Narcan nasal spray for subsequent doses and administer into alternating nostrils. May repeat every 2 to 3 minutes as needed. Qty: 1 Each, Refills: 0     Associated Diagnoses: Chronic bilateral low back pain, with sciatica presence unspecified       calcium-cholecalciferol, d3, (CALCIUM 600 + D) 600-125 mg-unit tab Take  by mouth.     Associated Diagnoses: Primary osteoarthritis of left knee       gabapentin (NEURONTIN) 600 mg tablet take 2 Tabs by mouth nightly.  Medication not on pharmacy formulary - Patient to bring day of surgery.         MULTIVITAMINS (MULTIVITAMIN PO) take  by mouth every evening.                 Activity: Activity as tolerated  Diet: Cardiac Diet              Follow-up Appointments   Procedures    FOLLOW UP VISIT Appointment in: 3 - 5 Days pcp       pcp       Standing Status:   Standing       Number of Occurrences:   1       Order Specific Question:   Appointment in       Answer:   3 - 5 Days    FOLLOW UP VISIT Appointment in: One Month surgery       surgery       Standing Status:   Standing       Number of Occurrences:   1       Standing Expiration Date:   7/3/2020       Order Specific Question:   Appointment in       Answer:   One Month         Signed By: Shamir Brock MD      July 2, 2020

## 2020-07-03 NOTE — PROGRESS NOTES
Problem: Falls - Risk of  Goal: *Absence of Falls  Description: Document Raúl Herring Fall Risk and appropriate interventions in the flowsheet.   Outcome: Progressing Towards Goal  Note: Fall Risk Interventions:  Mobility Interventions: OT consult for ADLs, Patient to call before getting OOB, PT Consult for mobility concerns, Utilize walker, cane, or other assistive device, PT Consult for assist device competence    Mentation Interventions: More frequent rounding, Update white board, Door open when patient unattended, Evaluate medications/consider consulting pharmacy, Eyeglasses and hearing aids, Increase mobility    Medication Interventions: Teach patient to arise slowly, Evaluate medications/consider consulting pharmacy    Elimination Interventions: Call light in reach

## 2020-07-03 NOTE — PROGRESS NOTES
Discharge instructions completed with patient. Follow up appointments and medications discussed with patient. Patient show verbal understanding of discharge instructions. No other verbalized needs made known during discharge instructions.

## 2020-07-06 PROBLEM — Z87.19 H/O SMALL BOWEL OBSTRUCTION: Status: ACTIVE | Noted: 2020-07-06

## 2020-07-07 ENCOUNTER — PATIENT OUTREACH (OUTPATIENT)
Dept: CASE MANAGEMENT | Age: 60
End: 2020-07-07

## 2020-07-07 NOTE — PROGRESS NOTES
This note will not be viewable in [address] 1st Attempt to contact patient for [facility] call, no answer, left  for returned call.  Will attempt to contact patient again within 24 hours

## 2020-07-09 ENCOUNTER — PATIENT OUTREACH (OUTPATIENT)
Dept: CASE MANAGEMENT | Age: 60
End: 2020-07-09

## 2020-07-09 NOTE — PROGRESS NOTES
This note will not be viewable in [address] 2nd attempt to contact patient for [facility] call, no answer, left VM. Will attempt 3rd call within 5 business days.

## 2020-07-10 ENCOUNTER — PATIENT OUTREACH (OUTPATIENT)
Dept: CASE MANAGEMENT | Age: 60
End: 2020-07-10

## 2020-07-10 NOTE — PROGRESS NOTES
This note will not be viewable in [address] 3rd attempt to contact patient for [facility] Call. No answer, left VM for returned call. Episode will be closed at this time as [address] has been unsuccessful in reaching the patient.

## 2021-05-24 ENCOUNTER — HOSPITAL ENCOUNTER (OUTPATIENT)
Dept: MAMMOGRAPHY | Age: 61
Discharge: HOME | End: 2021-05-24
Attending: FAMILY MEDICINE

## 2021-05-24 DIAGNOSIS — Z12.31 BREAST CANCER SCREENING BY MAMMOGRAM: ICD-10-CM

## 2021-08-04 ENCOUNTER — TRANSCRIBE ORDER (OUTPATIENT)
Dept: SCHEDULING | Age: 61
End: 2021-08-04

## 2021-08-04 DIAGNOSIS — Z12.31 VISIT FOR SCREENING MAMMOGRAM: Primary | ICD-10-CM

## 2021-08-06 PROBLEM — E11.40 TYPE 2 DIABETES MELLITUS WITH DIABETIC NEUROPATHY (HCC): Status: RESOLVED | Noted: 2018-09-24 | Resolved: 2021-08-06

## 2021-08-06 PROBLEM — E11.22 TYPE 2 DIABETES MELLITUS WITH CHRONIC KIDNEY DISEASE (HCC): Status: RESOLVED | Noted: 2020-02-27 | Resolved: 2021-08-06

## 2021-08-06 PROBLEM — E11.22 TYPE 2 DIABETES MELLITUS WITH CHRONIC KIDNEY DISEASE (HCC): Status: ACTIVE | Noted: 2020-02-27

## 2021-12-15 ENCOUNTER — HOSPITAL ENCOUNTER (OUTPATIENT)
Dept: MAMMOGRAPHY | Age: 61
Discharge: HOME | End: 2021-12-15
Attending: FAMILY MEDICINE

## 2021-12-15 DIAGNOSIS — Z12.31 SCREENING MAMMOGRAM, ENCOUNTER FOR: ICD-10-CM

## 2022-03-05 PROBLEM — H66.004 RECURRENT ACUTE SUPPURATIVE OTITIS MEDIA OF RIGHT EAR WITHOUT SPONTANEOUS RUPTURE OF TYMPANIC MEMBRANE: Status: ACTIVE | Noted: 2022-03-05

## 2022-03-19 PROBLEM — E03.9 ACQUIRED HYPOTHYROIDISM: Status: ACTIVE | Noted: 2017-12-27

## 2022-03-19 PROBLEM — Z87.19 H/O SMALL BOWEL OBSTRUCTION: Status: ACTIVE | Noted: 2020-07-06

## 2022-03-19 PROBLEM — M54.50 CHRONIC BILATERAL LOW BACK PAIN: Status: ACTIVE | Noted: 2020-06-27

## 2022-03-19 PROBLEM — G25.81 RESTLESS LEGS SYNDROME: Status: ACTIVE | Noted: 2017-12-27

## 2022-03-19 PROBLEM — G47.33 OSA (OBSTRUCTIVE SLEEP APNEA): Status: ACTIVE | Noted: 2020-06-27

## 2022-03-19 PROBLEM — E66.01 OBESITY, MORBID (HCC): Status: ACTIVE | Noted: 2017-12-27

## 2022-03-19 PROBLEM — R06.02 SHORTNESS OF BREATH ON EXERTION: Status: ACTIVE | Noted: 2019-11-25

## 2022-03-19 PROBLEM — F33.0 DEPRESSION, MAJOR, RECURRENT, MILD (HCC): Status: ACTIVE | Noted: 2020-02-27

## 2022-03-19 PROBLEM — F41.9 ANXIETY: Status: ACTIVE | Noted: 2017-12-27

## 2022-03-19 PROBLEM — G43.909 MIGRAINE WITHOUT STATUS MIGRAINOSUS, NOT INTRACTABLE: Status: ACTIVE | Noted: 2017-12-27

## 2022-03-19 PROBLEM — G89.29 CHRONIC BILATERAL LOW BACK PAIN: Status: ACTIVE | Noted: 2020-06-27

## 2022-03-19 PROBLEM — E11.9 TYPE 2 DIABETES MELLITUS WITHOUT COMPLICATION, WITHOUT LONG-TERM CURRENT USE OF INSULIN (HCC): Status: ACTIVE | Noted: 2017-12-27

## 2022-03-19 PROBLEM — E66.01 MORBID OBESITY WITH BMI OF 70 AND OVER, ADULT (HCC): Status: ACTIVE | Noted: 2018-12-12

## 2022-03-20 PROBLEM — E87.6 HYPOKALEMIA: Status: ACTIVE | Noted: 2020-06-28

## 2022-03-20 PROBLEM — H66.004 RECURRENT ACUTE SUPPURATIVE OTITIS MEDIA OF RIGHT EAR WITHOUT SPONTANEOUS RUPTURE OF TYMPANIC MEMBRANE: Status: ACTIVE | Noted: 2022-03-05

## 2022-03-20 PROBLEM — Z78.9 POOR TOLERANCE FOR AMBULATION: Status: ACTIVE | Noted: 2018-10-11

## 2022-03-20 PROBLEM — K56.609 SMALL BOWEL OBSTRUCTION (HCC): Status: ACTIVE | Noted: 2020-06-27

## 2022-03-20 PROBLEM — I10 ESSENTIAL HYPERTENSION: Status: ACTIVE | Noted: 2017-12-27

## 2022-04-22 PROBLEM — E11.22 TYPE 2 DIABETES MELLITUS WITH CHRONIC KIDNEY DISEASE (HCC): Status: ACTIVE | Noted: 2022-04-22

## 2022-05-25 DIAGNOSIS — R79.9 ABNORMAL BLOOD CHEMISTRY: ICD-10-CM

## 2022-05-25 DIAGNOSIS — E78.9 LIPID DISORDER: Primary | ICD-10-CM

## 2022-05-25 DIAGNOSIS — R80.9 PROTEINURIA, UNSPECIFIED TYPE: ICD-10-CM

## 2022-05-25 DIAGNOSIS — R94.6 ABNORMAL THYROID EXAM: ICD-10-CM

## 2022-05-25 DIAGNOSIS — Z13.228 SCREENING FOR METABOLIC DISORDER: ICD-10-CM

## 2022-06-06 ENCOUNTER — TELEMEDICINE (OUTPATIENT)
Dept: PRIMARY CARE CLINIC | Facility: CLINIC | Age: 62
End: 2022-06-06
Payer: MEDICARE

## 2022-06-06 DIAGNOSIS — E11.9 TYPE 2 DIABETES MELLITUS WITHOUT COMPLICATION, WITHOUT LONG-TERM CURRENT USE OF INSULIN (HCC): ICD-10-CM

## 2022-06-06 DIAGNOSIS — E11.22 TYPE 2 DIABETES MELLITUS WITH STAGE 3 CHRONIC KIDNEY DISEASE, WITHOUT LONG-TERM CURRENT USE OF INSULIN, UNSPECIFIED WHETHER STAGE 3A OR 3B CKD (HCC): ICD-10-CM

## 2022-06-06 DIAGNOSIS — K92.1 BLOOD CLOTS IN STOOL: Primary | ICD-10-CM

## 2022-06-06 DIAGNOSIS — N18.30 TYPE 2 DIABETES MELLITUS WITH STAGE 3 CHRONIC KIDNEY DISEASE, WITHOUT LONG-TERM CURRENT USE OF INSULIN, UNSPECIFIED WHETHER STAGE 3A OR 3B CKD (HCC): ICD-10-CM

## 2022-06-06 PROCEDURE — 99442 PR PHYS/QHP TELEPHONE EVALUATION 11-20 MIN: CPT | Performed by: FAMILY MEDICINE

## 2022-06-06 ASSESSMENT — ENCOUNTER SYMPTOMS
EYES NEGATIVE: 1
ALLERGIC/IMMUNOLOGIC NEGATIVE: 1
BLOOD IN STOOL: 1

## 2022-06-06 NOTE — PROGRESS NOTES
Taina Venegas (:  1960) is a Established patient, here for evaluation of the following:    Assessment & Plan   Below is the assessment and plan developed based on review of pertinent history, physical exam, labs, studies, and medications. 1. Blood clots in stool  2. Type 2 diabetes mellitus without complication, without long-term current use of insulin ([facility] [address]  3. Type 2 diabetes mellitus with stage 3 chronic kidney disease, without long-term current use of insulin, unspecified whether stage 3a or 3b CKD ([facility] [address]    Return in about 3 months (around 2022), or cmp, lipid, cbc, tsh, free t4, hgba1c, vit d, for for labs one week before appt. Subjective   HPI;  60 yo female who presents for follow up. Pt was supposed to have gotten labs but unable to come for appt. .     Pt has been passing blood in stool, hemorrhoids are a possible. Last GI check was done about 5 years. Pt will need a colonoscopy. Pt is referred to GI for screening and/or diagnostic colonoscopy as may be indicated. Pt reports as far as medications she is not aware of any need for refills, but will notify physician if any needed prior to next visit, 3 months in office. Labs indicated, cmp, lipid, cbc, tsh, free t4, hgba1c, vit d. Review of Systems   Constitutional:        Blood in stool with clots, see HPI   HENT: Negative. Eyes: Negative. Cardiovascular: Negative. Gastrointestinal: Positive for blood in stool. Endocrine: Negative. Genitourinary: Negative. Musculoskeletal: Negative. Allergic/Immunologic: Negative. Neurological: Negative.     Psychiatric/Behavioral:        Health anxiety          Objective   Patient-Reported Vitals  No data recorded            On this date 2022 I have spent 11-20  minutes reviewing previous notes, test results and face to face (virtual) with the patient discussing the diagnosis and importance of compliance with the treatment plan as well as documenting on the day of

## 2022-07-05 ENCOUNTER — HOSPITAL ENCOUNTER (OUTPATIENT)
Dept: MAMMOGRAPHY | Age: 62
Discharge: HOME | End: 2022-07-08

## 2022-07-05 DIAGNOSIS — Z12.39 SCREENING BREAST EXAMINATION: ICD-10-CM

## 2022-07-05 DIAGNOSIS — Z12.39 BREAST SCREENING: ICD-10-CM

## 2022-07-07 ENCOUNTER — HOSPITAL ENCOUNTER (OUTPATIENT)
Dept: MAMMOGRAPHY | Age: 62
Discharge: HOME | End: 2022-07-10

## 2022-07-07 DIAGNOSIS — Z12.31 ENCOUNTER FOR SCREENING MAMMOGRAM FOR MALIGNANT NEOPLASM OF BREAST: ICD-10-CM

## 2022-07-22 DIAGNOSIS — T78.40XD ALLERGY, SUBSEQUENT ENCOUNTER: ICD-10-CM

## 2022-07-22 DIAGNOSIS — F33.0 DEPRESSION, MAJOR, RECURRENT, MILD (HCC): ICD-10-CM

## 2022-07-22 DIAGNOSIS — E11.9 TYPE 2 DIABETES MELLITUS WITHOUT COMPLICATION, WITHOUT LONG-TERM CURRENT USE OF INSULIN (HCC): Primary | ICD-10-CM

## 2022-07-22 DIAGNOSIS — E03.9 ACQUIRED HYPOTHYROIDISM: ICD-10-CM

## 2022-07-22 DIAGNOSIS — M17.12 PRIMARY OSTEOARTHRITIS OF LEFT KNEE: ICD-10-CM

## 2022-07-22 DIAGNOSIS — I10 ESSENTIAL HYPERTENSION: ICD-10-CM

## 2022-07-22 DIAGNOSIS — E66.01 OBESITY, MORBID (HCC): ICD-10-CM

## 2022-07-22 DIAGNOSIS — F41.9 ANXIETY: ICD-10-CM

## 2022-07-22 DIAGNOSIS — G25.81 RESTLESS LEGS SYNDROME: ICD-10-CM

## 2022-07-22 DIAGNOSIS — K21.9 GASTROESOPHAGEAL REFLUX DISEASE, UNSPECIFIED WHETHER ESOPHAGITIS PRESENT: ICD-10-CM

## 2022-07-22 DIAGNOSIS — M17.11 PRIMARY OSTEOARTHRITIS OF RIGHT KNEE: ICD-10-CM

## 2022-07-22 DIAGNOSIS — N39.41 URGE INCONTINENCE OF URINE: ICD-10-CM

## 2022-07-22 RX ORDER — LOSARTAN POTASSIUM 100 MG/1
TABLET ORAL
Qty: 90 TABLET | Refills: 1 | Status: SHIPPED | OUTPATIENT
Start: 2022-07-22 | End: 2022-08-05 | Stop reason: SDUPTHER

## 2022-07-22 RX ORDER — LEVOTHYROXINE SODIUM 0.07 MG/1
75 TABLET ORAL
Qty: 90 TABLET | Refills: 1 | Status: SHIPPED | OUTPATIENT
Start: 2022-07-22 | End: 2022-08-05 | Stop reason: SDUPTHER

## 2022-07-22 RX ORDER — AMLODIPINE BESYLATE 10 MG/1
10 TABLET ORAL DAILY
Qty: 90 TABLET | Refills: 1 | Status: SHIPPED | OUTPATIENT
Start: 2022-07-22 | End: 2022-08-05 | Stop reason: SDUPTHER

## 2022-07-22 RX ORDER — FAMOTIDINE 20 MG/1
20 TABLET, FILM COATED ORAL 2 TIMES DAILY
Qty: 60 TABLET | Refills: 5 | Status: SHIPPED | OUTPATIENT
Start: 2022-07-22 | End: 2022-08-05 | Stop reason: SDUPTHER

## 2022-07-22 RX ORDER — FLUTICASONE PROPIONATE 50 MCG
2 SPRAY, SUSPENSION (ML) NASAL DAILY
Qty: 16 G | Refills: 5 | Status: SHIPPED | OUTPATIENT
Start: 2022-07-22 | End: 2022-08-05 | Stop reason: SDUPTHER

## 2022-07-22 RX ORDER — DULOXETIN HYDROCHLORIDE 60 MG/1
60 CAPSULE, DELAYED RELEASE ORAL 2 TIMES DAILY
Qty: 90 CAPSULE | Refills: 1 | Status: SHIPPED | OUTPATIENT
Start: 2022-07-22 | End: 2022-08-05 | Stop reason: SDUPTHER

## 2022-07-22 RX ORDER — CELECOXIB 200 MG/1
200 CAPSULE ORAL DAILY
Qty: 90 CAPSULE | Refills: 1 | Status: SHIPPED | OUTPATIENT
Start: 2022-07-22 | End: 2022-08-05 | Stop reason: SDUPTHER

## 2022-07-22 RX ORDER — PIOGLITAZONEHYDROCHLORIDE 30 MG/1
30 TABLET ORAL DAILY
Qty: 90 TABLET | Refills: 3 | Status: SHIPPED | OUTPATIENT
Start: 2022-07-22 | End: 2022-08-05 | Stop reason: SDUPTHER

## 2022-07-22 RX ORDER — HYDROCHLOROTHIAZIDE 25 MG/1
25 TABLET ORAL DAILY
Qty: 90 TABLET | Refills: 3 | Status: SHIPPED | OUTPATIENT
Start: 2022-07-22 | End: 2022-08-05 | Stop reason: SDUPTHER

## 2022-07-22 RX ORDER — GLIMEPIRIDE 1 MG/1
1 TABLET ORAL
Qty: 90 TABLET | Refills: 3 | Status: SHIPPED | OUTPATIENT
Start: 2022-07-22 | End: 2022-08-05 | Stop reason: SDUPTHER

## 2022-07-22 RX ORDER — CLONAZEPAM 1 MG/1
1 TABLET ORAL 2 TIMES DAILY
Qty: 60 TABLET | Refills: 2 | Status: SHIPPED | OUTPATIENT
Start: 2022-07-22 | End: 2022-08-05 | Stop reason: SDUPTHER

## 2022-08-05 DIAGNOSIS — T78.40XD ALLERGY, SUBSEQUENT ENCOUNTER: ICD-10-CM

## 2022-08-05 DIAGNOSIS — I10 ESSENTIAL HYPERTENSION: ICD-10-CM

## 2022-08-05 DIAGNOSIS — K21.9 GASTROESOPHAGEAL REFLUX DISEASE, UNSPECIFIED WHETHER ESOPHAGITIS PRESENT: ICD-10-CM

## 2022-08-05 DIAGNOSIS — M17.11 PRIMARY OSTEOARTHRITIS OF RIGHT KNEE: ICD-10-CM

## 2022-08-05 DIAGNOSIS — E03.9 ACQUIRED HYPOTHYROIDISM: ICD-10-CM

## 2022-08-05 DIAGNOSIS — N39.41 URGE INCONTINENCE OF URINE: ICD-10-CM

## 2022-08-05 DIAGNOSIS — E11.9 TYPE 2 DIABETES MELLITUS WITHOUT COMPLICATION, WITHOUT LONG-TERM CURRENT USE OF INSULIN (HCC): ICD-10-CM

## 2022-08-05 DIAGNOSIS — F33.0 DEPRESSION, MAJOR, RECURRENT, MILD (HCC): ICD-10-CM

## 2022-08-05 DIAGNOSIS — F41.9 ANXIETY: ICD-10-CM

## 2022-08-05 DIAGNOSIS — M17.12 PRIMARY OSTEOARTHRITIS OF LEFT KNEE: ICD-10-CM

## 2022-08-05 RX ORDER — FLUTICASONE PROPIONATE 50 MCG
2 SPRAY, SUSPENSION (ML) NASAL DAILY
Qty: 16 G | Refills: 5 | Status: SHIPPED | OUTPATIENT
Start: 2022-08-05

## 2022-08-05 RX ORDER — LEVOTHYROXINE SODIUM 0.07 MG/1
75 TABLET ORAL
Qty: 90 TABLET | Refills: 1 | Status: SHIPPED | OUTPATIENT
Start: 2022-08-05

## 2022-08-05 RX ORDER — LOSARTAN POTASSIUM 100 MG/1
TABLET ORAL
Qty: 90 TABLET | Refills: 1 | Status: SHIPPED | OUTPATIENT
Start: 2022-08-05

## 2022-08-05 RX ORDER — DULOXETIN HYDROCHLORIDE 60 MG/1
60 CAPSULE, DELAYED RELEASE ORAL 2 TIMES DAILY
Qty: 90 CAPSULE | Refills: 1 | Status: SHIPPED | OUTPATIENT
Start: 2022-08-05

## 2022-08-05 RX ORDER — CELECOXIB 200 MG/1
200 CAPSULE ORAL DAILY
Qty: 90 CAPSULE | Refills: 1 | Status: SHIPPED | OUTPATIENT
Start: 2022-08-05

## 2022-08-05 RX ORDER — CLONAZEPAM 1 MG/1
1 TABLET ORAL 2 TIMES DAILY
Qty: 60 TABLET | Refills: 2 | Status: SHIPPED | OUTPATIENT
Start: 2022-08-05 | End: 2022-09-04

## 2022-08-05 RX ORDER — FAMOTIDINE 20 MG/1
20 TABLET, FILM COATED ORAL 2 TIMES DAILY
Qty: 60 TABLET | Refills: 5 | Status: SHIPPED | OUTPATIENT
Start: 2022-08-05

## 2022-08-05 RX ORDER — GLIMEPIRIDE 1 MG/1
1 TABLET ORAL
Qty: 90 TABLET | Refills: 3 | Status: SHIPPED | OUTPATIENT
Start: 2022-08-05

## 2022-08-05 RX ORDER — PIOGLITAZONEHYDROCHLORIDE 30 MG/1
30 TABLET ORAL DAILY
Qty: 90 TABLET | Refills: 3 | Status: SHIPPED | OUTPATIENT
Start: 2022-08-05

## 2022-08-05 RX ORDER — AMLODIPINE BESYLATE 10 MG/1
10 TABLET ORAL DAILY
Qty: 90 TABLET | Refills: 1 | Status: SHIPPED | OUTPATIENT
Start: 2022-08-05

## 2022-08-05 RX ORDER — HYDROCHLOROTHIAZIDE 25 MG/1
25 TABLET ORAL DAILY
Qty: 90 TABLET | Refills: 3 | Status: SHIPPED | OUTPATIENT
Start: 2022-08-05

## 2022-12-07 DIAGNOSIS — M17.11 PRIMARY OSTEOARTHRITIS OF RIGHT KNEE: ICD-10-CM

## 2022-12-07 DIAGNOSIS — M17.12 PRIMARY OSTEOARTHRITIS OF LEFT KNEE: ICD-10-CM

## 2023-01-13 RX ORDER — CELECOXIB 200 MG/1
CAPSULE ORAL
Qty: 90 CAPSULE | Refills: 1 | OUTPATIENT
Start: 2023-01-13

## 2023-01-31 ENCOUNTER — TELEPHONE (OUTPATIENT)
Dept: PRIMARY CARE CLINIC | Facility: CLINIC | Age: 63
End: 2023-01-31

## 2023-02-17 DIAGNOSIS — F41.9 ANXIETY: ICD-10-CM

## 2023-02-17 RX ORDER — CLONAZEPAM 1 MG/1
1 TABLET ORAL 2 TIMES DAILY
Qty: 60 TABLET | Refills: 2 | Status: CANCELLED | OUTPATIENT
Start: 2023-02-17 | End: 2023-03-19

## 2023-02-17 RX ORDER — CLONAZEPAM 1 MG/1
1 TABLET ORAL 2 TIMES DAILY
Qty: 60 TABLET | Refills: 2 | Status: SHIPPED | OUTPATIENT
Start: 2023-02-17 | End: 2023-03-19

## 2023-02-28 LAB
MAMMOGRAPHY, EXTERNAL: NORMAL
MAMMOGRAPHY, EXTERNAL: NORMAL

## 2023-03-07 DIAGNOSIS — E78.9 LIPID DISORDER: ICD-10-CM

## 2023-03-07 DIAGNOSIS — R79.9 ABNORMAL BLOOD CHEMISTRY: ICD-10-CM

## 2023-03-07 DIAGNOSIS — R94.6 ABNORMAL THYROID EXAM: ICD-10-CM

## 2023-03-07 DIAGNOSIS — R80.9 PROTEINURIA, UNSPECIFIED TYPE: ICD-10-CM

## 2023-03-07 DIAGNOSIS — Z13.228 SCREENING FOR METABOLIC DISORDER: ICD-10-CM

## 2023-03-07 LAB
BASOPHILS # BLD: 0.1 K/UL (ref 0–0.2)
BASOPHILS NFR BLD: 1 % (ref 0–2)
DIFFERENTIAL METHOD BLD: NORMAL
EOSINOPHIL # BLD: 0.2 K/UL (ref 0–0.8)
EOSINOPHIL NFR BLD: 4 % (ref 0.5–7.8)
ERYTHROCYTE [DISTWIDTH] IN BLOOD BY AUTOMATED COUNT: 12.9 % (ref 11.9–14.6)
HCT VFR BLD AUTO: 42.8 % (ref 35.8–46.3)
HGB BLD-MCNC: 13.5 G/DL (ref 11.7–15.4)
IMM GRANULOCYTES # BLD AUTO: 0 K/UL (ref 0–0.5)
IMM GRANULOCYTES NFR BLD AUTO: 0 % (ref 0–5)
LYMPHOCYTES # BLD: 1.9 K/UL (ref 0.5–4.6)
LYMPHOCYTES NFR BLD: 29 % (ref 13–44)
MCH RBC QN AUTO: 31.5 PG (ref 26.1–32.9)
MCHC RBC AUTO-ENTMCNC: 31.5 G/DL (ref 31.4–35)
MCV RBC AUTO: 100 FL (ref 82–102)
MONOCYTES # BLD: 0.5 K/UL (ref 0.1–1.3)
MONOCYTES NFR BLD: 8 % (ref 4–12)
NEUTS SEG # BLD: 3.8 K/UL (ref 1.7–8.2)
NEUTS SEG NFR BLD: 58 % (ref 43–78)
NRBC # BLD: 0 K/UL (ref 0–0.2)
PLATELET # BLD AUTO: 239 K/UL (ref 150–450)
PMV BLD AUTO: 11.2 FL (ref 9.4–12.3)
RBC # BLD AUTO: 4.28 M/UL (ref 4.05–5.2)
WBC # BLD AUTO: 6.4 K/UL (ref 4.3–11.1)

## 2023-03-08 LAB
ALBUMIN SERPL-MCNC: 3.6 G/DL (ref 3.2–4.6)
ALBUMIN/GLOB SERPL: 1.2 (ref 0.4–1.6)
ALP SERPL-CCNC: 131 U/L (ref 50–136)
ALT SERPL-CCNC: 30 U/L (ref 12–65)
ANION GAP SERPL CALC-SCNC: 13 MMOL/L (ref 2–11)
AST SERPL-CCNC: 17 U/L (ref 15–37)
BILIRUB SERPL-MCNC: 0.3 MG/DL (ref 0.2–1.1)
BUN SERPL-MCNC: 21 MG/DL (ref 8–23)
CALCIUM SERPL-MCNC: 8.7 MG/DL (ref 8.3–10.4)
CHLORIDE SERPL-SCNC: 104 MMOL/L (ref 101–110)
CO2 SERPL-SCNC: 21 MMOL/L (ref 21–32)
CREAT SERPL-MCNC: 1 MG/DL (ref 0.6–1)
GLOBULIN SER CALC-MCNC: 2.9 G/DL (ref 2.8–4.5)
GLUCOSE SERPL-MCNC: 127 MG/DL (ref 65–100)
POTASSIUM SERPL-SCNC: 4.3 MMOL/L (ref 3.5–5.1)
PROT SERPL-MCNC: 6.5 G/DL (ref 6.3–8.2)
SODIUM SERPL-SCNC: 138 MMOL/L (ref 133–143)
T4 FREE SERPL-MCNC: 1.1 NG/DL (ref 0.78–1.46)
TSH, 3RD GENERATION: 6.79 UIU/ML (ref 0.36–3.74)

## 2023-03-13 ENCOUNTER — OFFICE VISIT (OUTPATIENT)
Dept: PRIMARY CARE CLINIC | Facility: CLINIC | Age: 63
End: 2023-03-13
Payer: MEDICARE

## 2023-03-13 VITALS
HEART RATE: 76 BPM | DIASTOLIC BLOOD PRESSURE: 69 MMHG | OXYGEN SATURATION: 91 % | BODY MASS INDEX: 73.45 KG/M2 | TEMPERATURE: 97.1 F | SYSTOLIC BLOOD PRESSURE: 139 MMHG | WEIGHT: 293 LBS

## 2023-03-13 DIAGNOSIS — I10 ESSENTIAL HYPERTENSION: ICD-10-CM

## 2023-03-13 DIAGNOSIS — N18.30 TYPE 2 DIABETES MELLITUS WITH STAGE 3 CHRONIC KIDNEY DISEASE, WITHOUT LONG-TERM CURRENT USE OF INSULIN, UNSPECIFIED WHETHER STAGE 3A OR 3B CKD (HCC): ICD-10-CM

## 2023-03-13 DIAGNOSIS — E11.22 TYPE 2 DIABETES MELLITUS WITH STAGE 3 CHRONIC KIDNEY DISEASE, WITHOUT LONG-TERM CURRENT USE OF INSULIN, UNSPECIFIED WHETHER STAGE 3A OR 3B CKD (HCC): ICD-10-CM

## 2023-03-13 DIAGNOSIS — K21.9 GASTROESOPHAGEAL REFLUX DISEASE, UNSPECIFIED WHETHER ESOPHAGITIS PRESENT: ICD-10-CM

## 2023-03-13 DIAGNOSIS — M17.12 PRIMARY OSTEOARTHRITIS OF LEFT KNEE: ICD-10-CM

## 2023-03-13 DIAGNOSIS — F33.0 DEPRESSION, MAJOR, RECURRENT, MILD (HCC): ICD-10-CM

## 2023-03-13 DIAGNOSIS — N39.41 URGE INCONTINENCE OF URINE: ICD-10-CM

## 2023-03-13 DIAGNOSIS — E03.9 ACQUIRED HYPOTHYROIDISM: ICD-10-CM

## 2023-03-13 DIAGNOSIS — M17.11 PRIMARY OSTEOARTHRITIS OF RIGHT KNEE: ICD-10-CM

## 2023-03-13 DIAGNOSIS — F41.9 ANXIETY: ICD-10-CM

## 2023-03-13 DIAGNOSIS — T78.40XD ALLERGY, SUBSEQUENT ENCOUNTER: ICD-10-CM

## 2023-03-13 DIAGNOSIS — J41.8 MIXED SIMPLE AND MUCOPURULENT CHRONIC BRONCHITIS (HCC): ICD-10-CM

## 2023-03-13 DIAGNOSIS — J01.00 ACUTE NON-RECURRENT MAXILLARY SINUSITIS: Primary | ICD-10-CM

## 2023-03-13 DIAGNOSIS — F13.20 SEDATIVE, HYPNOTIC OR ANXIOLYTIC DEPENDENCE, UNCOMPLICATED (HCC): ICD-10-CM

## 2023-03-13 PROCEDURE — 3023F SPIROM DOC REV: CPT | Performed by: FAMILY MEDICINE

## 2023-03-13 PROCEDURE — 3078F DIAST BP <80 MM HG: CPT | Performed by: FAMILY MEDICINE

## 2023-03-13 PROCEDURE — 99214 OFFICE O/P EST MOD 30 MIN: CPT | Performed by: FAMILY MEDICINE

## 2023-03-13 PROCEDURE — G8417 CALC BMI ABV UP PARAM F/U: HCPCS | Performed by: FAMILY MEDICINE

## 2023-03-13 PROCEDURE — G8427 DOCREV CUR MEDS BY ELIG CLIN: HCPCS | Performed by: FAMILY MEDICINE

## 2023-03-13 PROCEDURE — 2022F DILAT RTA XM EVC RTNOPTHY: CPT | Performed by: FAMILY MEDICINE

## 2023-03-13 PROCEDURE — 1036F TOBACCO NON-USER: CPT | Performed by: FAMILY MEDICINE

## 2023-03-13 PROCEDURE — 3046F HEMOGLOBIN A1C LEVEL >9.0%: CPT | Performed by: FAMILY MEDICINE

## 2023-03-13 PROCEDURE — 3074F SYST BP LT 130 MM HG: CPT | Performed by: FAMILY MEDICINE

## 2023-03-13 PROCEDURE — G8484 FLU IMMUNIZE NO ADMIN: HCPCS | Performed by: FAMILY MEDICINE

## 2023-03-13 PROCEDURE — 3017F COLORECTAL CA SCREEN DOC REV: CPT | Performed by: FAMILY MEDICINE

## 2023-03-13 RX ORDER — LOSARTAN POTASSIUM 100 MG/1
TABLET ORAL
Qty: 90 TABLET | Refills: 1 | Status: SHIPPED | OUTPATIENT
Start: 2023-03-13

## 2023-03-13 RX ORDER — AZITHROMYCIN 250 MG/1
250 TABLET, FILM COATED ORAL SEE ADMIN INSTRUCTIONS
Qty: 6 TABLET | Refills: 0 | Status: SHIPPED | OUTPATIENT
Start: 2023-03-13 | End: 2023-03-18

## 2023-03-13 RX ORDER — MONTELUKAST SODIUM 10 MG/1
10 TABLET ORAL DAILY
Qty: 90 TABLET | Refills: 3 | Status: SHIPPED | OUTPATIENT
Start: 2023-03-13

## 2023-03-13 RX ORDER — ALBUTEROL SULFATE 90 UG/1
2 AEROSOL, METERED RESPIRATORY (INHALATION) EVERY 4 HOURS PRN
Qty: 18 G | Refills: 5 | Status: SHIPPED | OUTPATIENT
Start: 2023-03-13

## 2023-03-13 RX ORDER — OMEPRAZOLE 40 MG/1
40 CAPSULE, DELAYED RELEASE ORAL DAILY
Qty: 90 CAPSULE | Refills: 3 | Status: SHIPPED | OUTPATIENT
Start: 2023-03-13

## 2023-03-13 RX ORDER — FLUTICASONE PROPIONATE 50 MCG
2 SPRAY, SUSPENSION (ML) NASAL DAILY
Qty: 16 G | Refills: 5 | Status: SHIPPED | OUTPATIENT
Start: 2023-03-13

## 2023-03-13 RX ORDER — TIZANIDINE 2 MG/1
TABLET ORAL
Qty: 30 TABLET | Refills: 5 | Status: SHIPPED | OUTPATIENT
Start: 2023-03-13

## 2023-03-13 RX ORDER — FAMOTIDINE 20 MG/1
20 TABLET, FILM COATED ORAL 2 TIMES DAILY
Qty: 60 TABLET | Refills: 5 | Status: SHIPPED | OUTPATIENT
Start: 2023-03-13

## 2023-03-13 RX ORDER — LEVOTHYROXINE SODIUM 0.07 MG/1
75 TABLET ORAL
Qty: 90 TABLET | Refills: 1 | Status: SHIPPED | OUTPATIENT
Start: 2023-03-13

## 2023-03-13 RX ORDER — CLONAZEPAM 1 MG/1
1 TABLET ORAL 2 TIMES DAILY
Qty: 60 TABLET | Refills: 2 | Status: SHIPPED | OUTPATIENT
Start: 2023-03-13 | End: 2023-04-12

## 2023-03-13 RX ORDER — HYDROCHLOROTHIAZIDE 25 MG/1
25 TABLET ORAL DAILY
Qty: 90 TABLET | Refills: 3 | Status: SHIPPED | OUTPATIENT
Start: 2023-03-13

## 2023-03-13 RX ORDER — DULOXETIN HYDROCHLORIDE 60 MG/1
60 CAPSULE, DELAYED RELEASE ORAL 2 TIMES DAILY
Qty: 90 CAPSULE | Refills: 1 | Status: SHIPPED | OUTPATIENT
Start: 2023-03-13

## 2023-03-13 RX ORDER — AMLODIPINE BESYLATE 10 MG/1
10 TABLET ORAL DAILY
Qty: 90 TABLET | Refills: 1 | Status: SHIPPED | OUTPATIENT
Start: 2023-03-13

## 2023-03-13 RX ORDER — GLIMEPIRIDE 1 MG/1
1 TABLET ORAL
Qty: 90 TABLET | Refills: 3 | Status: SHIPPED | OUTPATIENT
Start: 2023-03-13

## 2023-03-13 SDOH — ECONOMIC STABILITY: FOOD INSECURITY: WITHIN THE PAST 12 MONTHS, YOU WORRIED THAT YOUR FOOD WOULD RUN OUT BEFORE YOU GOT MONEY TO BUY MORE.: SOMETIMES TRUE

## 2023-03-13 SDOH — ECONOMIC STABILITY: INCOME INSECURITY: HOW HARD IS IT FOR YOU TO PAY FOR THE VERY BASICS LIKE FOOD, HOUSING, MEDICAL CARE, AND HEATING?: NOT HARD AT ALL

## 2023-03-13 SDOH — ECONOMIC STABILITY: FOOD INSECURITY: WITHIN THE PAST 12 MONTHS, THE FOOD YOU BOUGHT JUST DIDN'T LAST AND YOU DIDN'T HAVE MONEY TO GET MORE.: NEVER TRUE

## 2023-03-13 ASSESSMENT — PATIENT HEALTH QUESTIONNAIRE - PHQ9
SUM OF ALL RESPONSES TO PHQ QUESTIONS 1-9: 2
SUM OF ALL RESPONSES TO PHQ9 QUESTIONS 1 & 2: 2
1. LITTLE INTEREST OR PLEASURE IN DOING THINGS: 1
SUM OF ALL RESPONSES TO PHQ QUESTIONS 1-9: 2
2. FEELING DOWN, DEPRESSED OR HOPELESS: 1

## 2023-03-13 NOTE — PROGRESS NOTES
Jorge Thomas MD  [address] [place] [address].  Marla Marie 54  Ph No:  [phone]  Fax:  [phone]:  Chief Complaint   Patient presents with    Follow-up    Annual Exam     Pt in for annual physical today. Sinus Problem     Pt is haying sinus issues with drainage. HISTORY OF PRESENT ILLNESS:  Ms. Brendon Crane is a 57 y.o. female    Pt presents for annual physical exam.    Acutely, pt has been getting sinus headaches and some drainage, is not treating, some type of dry cough, tree pollen and reports is happening with seasonal pollens worsening. Pt is to continue using flonase and advised to dry and avoid exposures as much as possible, using AC of car to filter air when driving. Pt is reviewed as to the following labs (3/7/23):  GFR>60 normal healthy kidney function  Fasting glucose 127, hgba1c around 6%, controlled glucose  Total cholesterol prior lab 167, hdl 61, ration of cardiac risk low normal.  Liver enzymes alt 30, ast 17  Thyroid, tsh, elevated 6.790, but free t4 normal 1.1, will recheck and treat if needed next visit. Hgb 13.5, .0 normal may need more folic acid in diet. (Leafy vegetables)    The following medications are refilled for pt:  Amlodipine, losartan for BP management, BP stable 139/69, continue current therapy. Klonopin and cymbalta for general anxiety disorder with depression. Famotidine and omeprazole for acid reflux,  helping  Flonase for nasal allergies  Hctz for fluid edema in legs  Synthroid for hypothyroidism  Amaryl for diabetes management, see above  Myrbetriq for bladder spasm  Tizanidine for muscle spasm, using occasionally  Albuterol hfa for lung clearance  Zithromax for congestion, lung clearance, uri infection. Singulair for allergies.     Pt will RTC in 6 months, recheck, cmp, lipid,cbc, tsh, free t4, hgba1c, vit d      HISTORY:  Allergies   Allergen Reactions    Codeine Other (See Comments)     Pain in abdomen in empty stomach  Other reaction(s): GI upset    Sulfa Antibiotics Nausea Only    Levofloxacin Nausea And Vomiting     Sensitivity to medication, nausea and vomiting. Past Medical History:   Diagnosis Date    Acquired hypothyroidism 12/27/2017    Anxiety     Arthritis     Depression      & ANXIETY    Fibromyalgia     Hypertension 1977    Obesity, morbid ([address]     Psychiatric disorder 2004    depression    Psychiatric disorder 2004    Anxiety disorder    Sleep apnea     uses c-pap, non compliant    Type 2 diabetes mellitus without complication, without long-term current use of insulin ([address] 12/27/2017    Unspecified adverse effect of anesthesia     after gastric bypass, woke up on vent     Past Surgical History:   Procedure Laterality Date    [facility]      X 2    Pieter Runner  2002    Dr. Prasanna Mcdonough  July 2009    Right knee replacement    ORTHOPEDIC SURGERY  1975    rt knee has screw in knee tendons streched    UNS ORAL SURG PROC BY REPORT  2000     Family History   Problem Relation Age of Onset    Heart Disease Father     Elevated Lipids Father     Diabetes Mother     Rheum Arthritis Mother     Heart Attack Mother     Heart Disease Mother     COPD Mother     Post-op Nausea/Vomiting Mother     Ovarian Cancer Paternal Grandmother     Hypertension Mother     Hypertension Father     Heart Attack Father     Hypertension Sister     Heart Attack Brother     Diabetes Maternal Grandfather     Cancer Father      Social History     Socioeconomic History    Marital status:       Spouse name: Not on file    Number of children: Not on file    Years of education: Not on file    Highest education level: Not on file   Occupational History    Not on file   Tobacco Use    Smoking status: Never    Smokeless tobacco: Never   Substance and Sexual Activity    Alcohol use: No    Drug use: No    Sexual activity: Not on file   Other Topics Concern    Not on file   Social History Narrative    Not on file     Social Determinants of Health     Financial Resource Strain: Low Risk     Difficulty of Paying Living Expenses: Not hard at all   Food Insecurity: Food Insecurity Present    Worried About Running Out of Food in the Last Year: Sometimes true    Ran Out of Food in the Last Year: Never true   Transportation Needs: Unknown    Lack of Transportation (Medical): Not on file    Lack of Transportation (Non-Medical): No   Physical Activity: Not on file   Stress: Not on file   Social Connections: Not on file   Intimate Partner Violence: Not on file   Housing Stability: Not on file     Current Outpatient Medications   Medication Sig Dispense Refill    amLODIPine (NORVASC) 10 MG tablet Take 1 tablet by mouth daily TAKE 1 TABLET BY MOUTH EVERY DAY 90 tablet 1    clonazePAM (KLONOPIN) 1 MG tablet Take 1 tablet by mouth 2 times daily for 30 days. 60 tablet 2    DULoxetine (CYMBALTA) 60 MG extended release capsule Take 1 capsule by mouth 2 times daily 90 capsule 1    famotidine (PEPCID) 20 MG tablet Take 1 tablet by mouth 2 times daily TAKE 1 TABLET BY MOUTH TWICE A DAY 60 tablet 5    fluticasone (FLONASE) 50 MCG/ACT nasal spray 2 sprays by Nasal route daily 16 g 5    glimepiride (AMARYL) 1 MG tablet Take 1 tablet by mouth every morning (before breakfast) 90 tablet 3    hydroCHLOROthiazide (HYDRODIURIL) 25 MG tablet Take 1 tablet by mouth daily TAKE 1 TABLET BY MOUTH EVERY DAY 90 tablet 3    levothyroxine (SYNTHROID) 75 MCG tablet Take 1 tablet by mouth every morning (before breakfast) 90 tablet 1    losartan (COZAAR) 100 MG tablet TAKE 1/2 TABLET BY MOUTH EVERY DAY 90 tablet 1    mirabegron (MYRBETRIQ) 50 MG TB24 Take 50 mg by mouth daily 90 tablet 1    omeprazole (PRILOSEC) 40 MG delayed release capsule Take 1 capsule by mouth daily TAKE 1 CAPSULE BY MOUTH EVERY DAY 90 capsule 3    tiZANidine (ZANAFLEX) 2 MG tablet Take by mouth once daily  as needed 30 tablet 5    albuterol sulfate HFA (PROVENTIL;VENTOLIN;PROAIR) 108 (90 Base) MCG/ACT inhaler Inhale 2 puffs into the lungs every 4 hours as needed for Wheezing TAKE 1 PUFF BY INHALATION EVERY FOUR (4) HOURS AS NEEDED FOR WHEEZING 18 g 5    azithromycin (ZITHROMAX) 250 MG tablet Take 1 tablet by mouth See Admin Instructions for 5 days 500mg on day 1 followed by 250mg on days 2 - 5 6 tablet 0    montelukast (SINGULAIR) 10 MG tablet Take 1 tablet by mouth daily 90 tablet 3    celecoxib (CELEBREX) 200 MG capsule Take 1 capsule by mouth in the morning. 90 capsule 1    pioglitazone (ACTOS) 30 MG tablet Take 1 tablet by mouth in the morning. TAKE 1 TABLET BY MOUTH EVERY DAY. 90 tablet 3    budesonide-formoterol (SYMBICORT) 160-4.5 MCG/ACT AERO Inhale 2 puffs into the lungs 2 times daily      Calcium Carbonate-Vitamin D (CALCIUM-VITAMIN D) 600-125 MG-UNIT TABS Take by mouth      diclofenac sodium (VOLTAREN) 1 % GEL Apply 4 g topically 4 times daily      gabapentin (NEURONTIN) 600 MG tablet Take 2 tablets by mouth.      naloxone 4 MG/0.1ML LIQD nasal spray Use 1 spray intranasally into 1 nostril. Use a new Narcan nasal spray for subsequent doses and administer into alternating nostrils. May repeat every 2 to 3 minutes as needed. rOPINIRole (REQUIP) 2 MG tablet Take 2 mg by mouth      SUMAtriptan (IMITREX) 50 MG tablet TAKE 1TAB BY MOUTH ONCE AS NEEDED FOR MIGRAINE (MAY REPEAT IN 2 HOURS, MAX 2 TAB/24)      tolterodine (DETROL LA) 4 MG extended release capsule Take 4 mg by mouth daily      methylPREDNISolone (MEDROL DOSEPACK) 4 MG tablet Use as directed       No current facility-administered medications for this visit. REVIEW OF SYSTEMS:  Review of systems is as indicated in HPI otherwise negative.     PHYSICAL EXAM:  Vital Signs - /69 (Site: Left Lower Arm, Position: Sitting, Cuff Size: Large Adult)   Pulse 76   Temp 97.1 °F (36.2 °C) (Temporal)   Wt (!) 401 lb 9.6 oz (182.2 kg)   SpO2 91% BMI 73.45 kg/m²      Physical Exam  Vitals and nursing note reviewed. Constitutional:       General: She is in acute distress. Appearance: Normal appearance. She is obese. Comments: See HPI, multiple medical concerns   HENT:      Head: Normocephalic and atraumatic. Ears:      Comments: TM swelling with some minimal erythematous changes, secondary to allergies, uri congestion     Nose: Congestion present. Mouth/Throat:      Mouth: Mucous membranes are moist.      Pharynx: Posterior oropharyngeal erythema present. Eyes:      Extraocular Movements: Extraocular movements intact. Conjunctiva/sclera: Conjunctivae normal.      Pupils: Pupils are equal, round, and reactive to light. Cardiovascular:      Rate and Rhythm: Normal rate and regular rhythm. Pulses: Normal pulses. Heart sounds: Normal heart sounds. Pulmonary:      Effort: Pulmonary effort is normal.      Comments: Coarse breath sounds, with upper rhonchi, consistent with URI and seasonal allergies, sinus drainage  Abdominal:      General: Bowel sounds are normal.      Palpations: Abdomen is soft. Comments: Morbidly obese, BMI 73.45, weight 401lbs   Musculoskeletal:         General: Normal range of motion. Cervical back: Normal range of motion and neck supple. Skin:     General: Skin is warm and dry. Capillary Refill: Capillary refill takes 2 to 3 seconds. Neurological:      General: No focal deficit present. Mental Status: She is alert and oriented to person, place, and time. Psychiatric:         Behavior: Behavior normal.         Thought Content: Thought content normal.         Judgment: Judgment normal.      Comments: Anxiety and mild depression, on meds           LABS  No results found for this visit on 03/13/23. IMPRESSION/PLAN     Diagnosis Orders   1.  Acute non-recurrent maxillary sinusitis  albuterol sulfate HFA (PROVENTIL;VENTOLIN;PROAIR) 108 (90 Base) MCG/ACT inhaler azithromycin (ZITHROMAX) 250 MG tablet      2. Essential hypertension  amLODIPine (NORVASC) 10 MG tablet    hydroCHLOROthiazide (HYDRODIURIL) 25 MG tablet    losartan (COZAAR) 100 MG tablet      3. Primary osteoarthritis of left knee  tiZANidine (ZANAFLEX) 2 MG tablet      4. Primary osteoarthritis of right knee  tiZANidine (ZANAFLEX) 2 MG tablet      5. Anxiety  clonazePAM (KLONOPIN) 1 MG tablet      6. Depression, major, recurrent, mild (HCC)  DULoxetine (CYMBALTA) 60 MG extended release capsule      7. Gastroesophageal reflux disease, unspecified whether esophagitis present  famotidine (PEPCID) 20 MG tablet    omeprazole (PRILOSEC) 40 MG delayed release capsule      8. Allergy, subsequent encounter  fluticasone (FLONASE) 50 MCG/ACT nasal spray    montelukast (SINGULAIR) 10 MG tablet      9. Acquired hypothyroidism  levothyroxine (SYNTHROID) 75 MCG tablet      10. Urge incontinence of urine  mirabegron (MYRBETRIQ) 50 MG TB24      11. Mixed simple and mucopurulent chronic bronchitis (HCC)  albuterol sulfate HFA (PROVENTIL;VENTOLIN;PROAIR) 108 (90 Base) MCG/ACT inhaler    azithromycin (ZITHROMAX) 250 MG tablet    montelukast (SINGULAIR) 10 MG tablet      12. Type 2 diabetes mellitus with stage 3 chronic kidney disease, without long-term current use of insulin, unspecified whether stage 3a or 3b CKD (HCC)  glimepiride (AMARYL) 1 MG tablet      13. Sedative, hypnotic or anxiolytic dependence, uncomplicated  clonazePAM (KLONOPIN) 1 MG tablet          No follow-up provider specified. Gema Heart MD            Dictated using voice recognition software.  Proofread, but unrecognized voice recognition errors may exist.

## 2023-03-14 PROBLEM — J01.00 ACUTE NON-RECURRENT MAXILLARY SINUSITIS: Status: ACTIVE | Noted: 2023-03-14

## 2023-03-14 PROBLEM — T78.40XA ALLERGIES: Status: ACTIVE | Noted: 2023-03-14

## 2023-03-14 PROBLEM — K21.9 GASTROESOPHAGEAL REFLUX DISEASE: Status: ACTIVE | Noted: 2023-03-14

## 2023-03-14 PROBLEM — N39.41 URGE INCONTINENCE OF URINE: Status: ACTIVE | Noted: 2023-03-14

## 2023-04-03 PROBLEM — E11.40 TYPE 2 DIABETES MELLITUS WITH DIABETIC NEUROPATHY (HCC): Status: RESOLVED | Noted: 2018-09-24 | Resolved: 2021-08-06

## 2023-04-03 PROBLEM — E11.22 TYPE 2 DIABETES MELLITUS WITH CHRONIC KIDNEY DISEASE (HCC): Status: RESOLVED | Noted: 2020-02-27 | Resolved: 2021-08-06

## 2023-04-24 ENCOUNTER — E-VISIT (OUTPATIENT)
Dept: PRIMARY CARE CLINIC | Facility: CLINIC | Age: 63
End: 2023-04-24
Payer: MEDICARE

## 2023-04-24 DIAGNOSIS — R30.0 DYSURIA: Primary | ICD-10-CM

## 2023-04-24 PROCEDURE — 99422 OL DIG E/M SVC 11-20 MIN: CPT | Performed by: FAMILY MEDICINE

## 2023-04-24 RX ORDER — CEPHALEXIN 500 MG/1
500 CAPSULE ORAL 3 TIMES DAILY
Qty: 21 CAPSULE | Refills: 0 | Status: SHIPPED | OUTPATIENT
Start: 2023-04-24 | End: 2023-04-25

## 2023-04-25 DIAGNOSIS — J01.00 ACUTE NON-RECURRENT MAXILLARY SINUSITIS: Primary | ICD-10-CM

## 2023-04-25 DIAGNOSIS — R30.0 DYSURIA: Primary | ICD-10-CM

## 2023-04-25 RX ORDER — CEFDINIR 300 MG/1
300 CAPSULE ORAL 2 TIMES DAILY
Qty: 14 CAPSULE | Refills: 0 | Status: SHIPPED | OUTPATIENT
Start: 2023-04-25 | End: 2023-05-02

## 2023-04-25 RX ORDER — NITROFURANTOIN 25; 75 MG/1; MG/1
100 CAPSULE ORAL 2 TIMES DAILY
Qty: 20 CAPSULE | Refills: 0 | Status: SHIPPED | OUTPATIENT
Start: 2023-04-25 | End: 2023-05-05

## 2023-04-25 NOTE — PROGRESS NOTES
Trixie Griffith (1960) initiated an asynchronous digital communication through [address] [place] [address]. HPI: per patient questionnaire, pt is treated for acute cystitis, allergic to sulfa and levaquin. Exam: not applicable    Diagnoses and all orders for this visit:  Diagnoses and all orders for this visit:    Dysuria  -     cephALEXin (KEFLEX) 500 MG capsule; Take 1 capsule by mouth 3 times daily for 7 days          Time: EV2 - 11-20 minutes were spent on the digital evaluation and management of this patient.      Chase Kemp MD

## 2023-06-05 DIAGNOSIS — R60.0 LOCALIZED EDEMA: Primary | ICD-10-CM

## 2023-06-05 RX ORDER — FUROSEMIDE 20 MG/1
TABLET ORAL
Qty: 30 TABLET | Refills: 3 | Status: SHIPPED | OUTPATIENT
Start: 2023-06-05

## 2023-06-19 DIAGNOSIS — M17.12 PRIMARY OSTEOARTHRITIS OF LEFT KNEE: ICD-10-CM

## 2023-06-19 DIAGNOSIS — M17.11 PRIMARY OSTEOARTHRITIS OF RIGHT KNEE: ICD-10-CM

## 2023-06-19 RX ORDER — OXYCODONE HYDROCHLORIDE 10 MG/1
10 TABLET ORAL EVERY 6 HOURS PRN
Qty: 28 TABLET | Refills: 0 | Status: SHIPPED | OUTPATIENT
Start: 2023-06-19 | End: 2023-06-26

## 2023-06-22 ENCOUNTER — TELEMEDICINE (OUTPATIENT)
Dept: PRIMARY CARE CLINIC | Facility: CLINIC | Age: 63
End: 2023-06-22
Payer: MEDICARE

## 2023-06-22 DIAGNOSIS — J02.9 ACUTE SORE THROAT: Primary | ICD-10-CM

## 2023-06-22 PROCEDURE — 99441 PR PHYS/QHP TELEPHONE EVALUATION 5-10 MIN: CPT | Performed by: FAMILY MEDICINE

## 2023-06-22 RX ORDER — AZITHROMYCIN 500 MG/1
500 TABLET, FILM COATED ORAL DAILY
Qty: 6 TABLET | Refills: 0 | Status: SHIPPED | OUTPATIENT
Start: 2023-06-22 | End: 2023-06-28

## 2023-06-22 RX ORDER — BENZONATATE 200 MG/1
200 CAPSULE ORAL 3 TIMES DAILY PRN
Qty: 30 CAPSULE | Refills: 0 | Status: SHIPPED | OUTPATIENT
Start: 2023-06-22 | End: 2023-07-02

## 2023-06-22 RX ORDER — METHYLPREDNISOLONE 4 MG/1
TABLET ORAL
Qty: 1 KIT | Refills: 0 | Status: SHIPPED | OUTPATIENT
Start: 2023-06-22 | End: 2023-06-28

## 2023-06-22 NOTE — PROGRESS NOTES
Venessa Hatchet is a 57 y.o. female evaluated via telephone on 6/22/2023 for No chief complaint on file. .        Documentation:  I communicated with the patient and/or health care decision maker about sore throat. Pt says right has been hurting and throat sore on right side. Pt says a little in chest.  Pt has had no fever. Right ear infection and sore throat. Pt is given antibiotic zithromax for right ear infection and sore throat, medrol dose pack and is to be given tessalon perles for throat soothing and cough. Pt will RTC as needed, keep any scheduled appt. Details of this discussion including any medical advice provided: as noted    Total Time: minutes: 5-10 minutes    Venessa Hatchet was evaluated through a synchronous (real-time) audio encounter. Patient identification was verified at the start of the visit. She (or guardian if applicable) is aware that this is a billable service, which includes applicable co-pays. This visit was conducted with the patient's (and/or legal guardian's) verbal consent. She has not had a related appointment within my department in the past 7 days or scheduled within the next 24 hours. The patient was located at Home: [address] [place] [address] 20 [address] [facility] [address]. The provider was located at [facility] Lucy ,  [facility]  [facility]..     Note: not billable if this call serves to triage the patient into an appointment for the relevant concern    Marylu Maki MD

## 2023-07-19 ENCOUNTER — OFFICE VISIT (OUTPATIENT)
Dept: PRIMARY CARE CLINIC | Facility: CLINIC | Age: 63
End: 2023-07-19
Payer: MEDICARE

## 2023-07-19 DIAGNOSIS — R30.0 DYSURIA: Primary | ICD-10-CM

## 2023-07-19 DIAGNOSIS — R30.0 DYSURIA: ICD-10-CM

## 2023-07-19 LAB
BILIRUBIN, URINE, POC: ABNORMAL
BLOOD URINE, POC: NEGATIVE
GLUCOSE URINE, POC: NEGATIVE
KETONES, URINE, POC: NEGATIVE
LEUKOCYTE ESTERASE, URINE, POC: ABNORMAL
NITRITE, URINE, POC: POSITIVE
PH, URINE, POC: 5.5 (ref 4.6–8)
PROTEIN,URINE, POC: 30
SPECIFIC GRAVITY, URINE, POC: 1.02 (ref 1–1.03)
URINALYSIS CLARITY, POC: ABNORMAL
URINALYSIS COLOR, POC: YELLOW
UROBILINOGEN, POC: NORMAL

## 2023-07-19 PROCEDURE — G8428 CUR MEDS NOT DOCUMENT: HCPCS | Performed by: FAMILY MEDICINE

## 2023-07-19 PROCEDURE — 81003 URINALYSIS AUTO W/O SCOPE: CPT | Performed by: FAMILY MEDICINE

## 2023-07-19 PROCEDURE — G8417 CALC BMI ABV UP PARAM F/U: HCPCS | Performed by: FAMILY MEDICINE

## 2023-07-19 PROCEDURE — 1036F TOBACCO NON-USER: CPT | Performed by: FAMILY MEDICINE

## 2023-07-19 PROCEDURE — 3017F COLORECTAL CA SCREEN DOC REV: CPT | Performed by: FAMILY MEDICINE

## 2023-07-19 PROCEDURE — 99213 OFFICE O/P EST LOW 20 MIN: CPT | Performed by: FAMILY MEDICINE

## 2023-07-19 RX ORDER — PHENAZOPYRIDINE HYDROCHLORIDE 200 MG/1
200 TABLET, FILM COATED ORAL 3 TIMES DAILY PRN
Qty: 9 TABLET | Refills: 0 | Status: SHIPPED | OUTPATIENT
Start: 2023-07-19 | End: 2023-07-22

## 2023-07-19 RX ORDER — CIPROFLOXACIN 500 MG/1
500 TABLET, FILM COATED ORAL 2 TIMES DAILY
Qty: 14 TABLET | Refills: 0 | Status: SHIPPED | OUTPATIENT
Start: 2023-07-19 | End: 2023-07-26

## 2023-07-19 NOTE — PROGRESS NOTES
Yina Molina MD  [address] [place] [address].  [address]  Ph No:  [phone]  Fax:  [phone]:  No chief complaint on file. HISTORY OF PRESENT ILLNESS:  Ms. Marianna Siddiqi is a 57 y.o. female. Pt presents for uti,  pt was found to be positive for uti and needed treatment in clinic. Pt is given cipro and pyridium. Pt has a urologist and will contact her or him and advise has had another urine infection. Pt will RTC as needed. HISTORY:  Allergies   Allergen Reactions    Codeine Other (See Comments)     Pain in abdomen in empty stomach  Other reaction(s): GI upset    Sulfa Antibiotics Nausea Only    Levofloxacin Nausea And Vomiting     Sensitivity to medication, nausea and vomiting.      Past Medical History:   Diagnosis Date    Acquired hypothyroidism 12/27/2017    Anxiety     Arthritis     Depression      & ANXIETY    Fibromyalgia     Hypertension 1977    Obesity, morbid ([address])     Psychiatric disorder 2004    depression    Psychiatric disorder 2004    Anxiety disorder    Sleep apnea     uses c-pap, non compliant    Type 2 diabetes mellitus without complication, without long-term current use of insulin ([address]) 12/27/2017    Unspecified adverse effect of anesthesia     after gastric bypass, woke up on vent     Past Surgical History:   Procedure Laterality Date    [address]      X 2    Pamelalean Estrin  2002    Dr. Ellis Quijano  July 2009    Right knee replacement    ORTHOPEDIC SURGERY  1975    rt knee has screw in knee tendons streched    [facility] ORAL SURG PROC BY REPORT  2000     Family History   Problem Relation Age of Onset    Heart Disease Father     Elevated Lipids Father     Diabetes Mother     Rheum Arthritis Mother     Heart Attack Mother     Heart Disease Mother     COPD Mother     Post-op Nausea/Vomiting Mother     Ovarian Cancer Paternal

## 2023-07-20 VITALS
SYSTOLIC BLOOD PRESSURE: 128 MMHG | WEIGHT: 293 LBS | OXYGEN SATURATION: 98 % | TEMPERATURE: 98.1 F | HEART RATE: 75 BPM | HEIGHT: 62 IN | BODY MASS INDEX: 53.92 KG/M2 | DIASTOLIC BLOOD PRESSURE: 72 MMHG

## 2023-07-22 LAB
BACTERIA SPEC CULT: NORMAL
SERVICE CMNT-IMP: NORMAL

## 2023-08-07 DIAGNOSIS — N18.30 TYPE 2 DIABETES MELLITUS WITH STAGE 3 CHRONIC KIDNEY DISEASE, WITHOUT LONG-TERM CURRENT USE OF INSULIN, UNSPECIFIED WHETHER STAGE 3A OR 3B CKD (HCC): ICD-10-CM

## 2023-08-07 DIAGNOSIS — E11.22 TYPE 2 DIABETES MELLITUS WITH STAGE 3 CHRONIC KIDNEY DISEASE, WITHOUT LONG-TERM CURRENT USE OF INSULIN, UNSPECIFIED WHETHER STAGE 3A OR 3B CKD (HCC): ICD-10-CM

## 2023-08-09 ENCOUNTER — COMMUNITY OUTREACH (OUTPATIENT)
Dept: PRIMARY CARE CLINIC | Facility: CLINIC | Age: 63
End: 2023-08-09

## 2023-08-15 RX ORDER — GLIMEPIRIDE 1 MG/1
1 TABLET ORAL
Qty: 90 TABLET | Refills: 3 | OUTPATIENT
Start: 2023-08-15

## 2023-09-05 DIAGNOSIS — F33.0 DEPRESSION, MAJOR, RECURRENT, MILD (HCC): ICD-10-CM

## 2023-09-07 ENCOUNTER — NURSE ONLY (OUTPATIENT)
Dept: PRIMARY CARE CLINIC | Facility: CLINIC | Age: 63
End: 2023-09-07

## 2023-09-07 DIAGNOSIS — E66.01 OBESITY, MORBID (HCC): ICD-10-CM

## 2023-09-07 DIAGNOSIS — E87.6 HYPOKALEMIA: ICD-10-CM

## 2023-09-07 DIAGNOSIS — I10 ESSENTIAL HYPERTENSION: ICD-10-CM

## 2023-09-07 DIAGNOSIS — I10 ESSENTIAL HYPERTENSION: Primary | ICD-10-CM

## 2023-09-07 DIAGNOSIS — E11.9 TYPE 2 DIABETES MELLITUS WITHOUT COMPLICATION, WITHOUT LONG-TERM CURRENT USE OF INSULIN (HCC): ICD-10-CM

## 2023-09-07 DIAGNOSIS — E03.9 ACQUIRED HYPOTHYROIDISM: ICD-10-CM

## 2023-09-07 LAB
BASOPHILS # BLD: 0 K/UL (ref 0–0.2)
BASOPHILS NFR BLD: 0 % (ref 0–2)
DIFFERENTIAL METHOD BLD: ABNORMAL
EOSINOPHIL # BLD: 0.2 K/UL (ref 0–0.8)
EOSINOPHIL NFR BLD: 3 % (ref 0.5–7.8)
ERYTHROCYTE [DISTWIDTH] IN BLOOD BY AUTOMATED COUNT: 13.1 % (ref 11.9–14.6)
HCT VFR BLD AUTO: 42.8 % (ref 35.8–46.3)
HGB BLD-MCNC: 13.4 G/DL (ref 11.7–15.4)
IMM GRANULOCYTES # BLD AUTO: 0 K/UL (ref 0–0.5)
IMM GRANULOCYTES NFR BLD AUTO: 0 % (ref 0–5)
LYMPHOCYTES # BLD: 2 K/UL (ref 0.5–4.6)
LYMPHOCYTES NFR BLD: 22 % (ref 13–44)
MCH RBC QN AUTO: 31.5 PG (ref 26.1–32.9)
MCHC RBC AUTO-ENTMCNC: 31.3 G/DL (ref 31.4–35)
MCV RBC AUTO: 100.5 FL (ref 82–102)
MONOCYTES # BLD: 0.7 K/UL (ref 0.1–1.3)
MONOCYTES NFR BLD: 8 % (ref 4–12)
NEUTS SEG # BLD: 6.2 K/UL (ref 1.7–8.2)
NEUTS SEG NFR BLD: 67 % (ref 43–78)
NRBC # BLD: 0 K/UL (ref 0–0.2)
PLATELET # BLD AUTO: 276 K/UL (ref 150–450)
PMV BLD AUTO: 11.4 FL (ref 9.4–12.3)
RBC # BLD AUTO: 4.26 M/UL (ref 4.05–5.2)
WBC # BLD AUTO: 9.2 K/UL (ref 4.3–11.1)

## 2023-09-08 DIAGNOSIS — F33.0 DEPRESSION, MAJOR, RECURRENT, MILD (HCC): ICD-10-CM

## 2023-09-08 LAB
ALBUMIN SERPL-MCNC: 3.5 G/DL (ref 3.2–4.6)
ALBUMIN/GLOB SERPL: 1 (ref 0.4–1.6)
ALP SERPL-CCNC: 147 U/L (ref 50–136)
ALT SERPL-CCNC: 30 U/L (ref 12–65)
ANION GAP SERPL CALC-SCNC: 4 MMOL/L (ref 2–11)
AST SERPL-CCNC: 18 U/L (ref 15–37)
BILIRUB SERPL-MCNC: 0.5 MG/DL (ref 0.2–1.1)
BUN SERPL-MCNC: 23 MG/DL (ref 8–23)
CALCIUM SERPL-MCNC: 8.9 MG/DL (ref 8.3–10.4)
CHLORIDE SERPL-SCNC: 104 MMOL/L (ref 101–110)
CHOLEST SERPL-MCNC: 152 MG/DL
CO2 SERPL-SCNC: 28 MMOL/L (ref 21–32)
CREAT SERPL-MCNC: 1 MG/DL (ref 0.6–1)
CREAT UR-MCNC: 182 MG/DL
EST. AVERAGE GLUCOSE BLD GHB EST-MCNC: 131 MG/DL
GLOBULIN SER CALC-MCNC: 3.5 G/DL (ref 2.8–4.5)
GLUCOSE SERPL-MCNC: 125 MG/DL (ref 65–100)
HBA1C MFR BLD: 6.2 % (ref 4.8–5.6)
HDLC SERPL-MCNC: 48 MG/DL (ref 40–60)
HDLC SERPL: 3.2
LDLC SERPL CALC-MCNC: 83.4 MG/DL
MICROALBUMIN UR-MCNC: 6.51 MG/DL
MICROALBUMIN/CREAT UR-RTO: 36 MG/G (ref 0–30)
POTASSIUM SERPL-SCNC: 4 MMOL/L (ref 3.5–5.1)
PROT SERPL-MCNC: 7 G/DL (ref 6.3–8.2)
SODIUM SERPL-SCNC: 136 MMOL/L (ref 133–143)
T4 FREE SERPL-MCNC: 1.2 NG/DL (ref 0.78–1.46)
TRIGL SERPL-MCNC: 103 MG/DL (ref 35–150)
TSH, 3RD GENERATION: 3.12 UIU/ML (ref 0.36–3.74)
VLDLC SERPL CALC-MCNC: 20.6 MG/DL (ref 6–23)

## 2023-09-11 SDOH — ECONOMIC STABILITY: FOOD INSECURITY: WITHIN THE PAST 12 MONTHS, YOU WORRIED THAT YOUR FOOD WOULD RUN OUT BEFORE YOU GOT MONEY TO BUY MORE.: NEVER TRUE

## 2023-09-11 SDOH — ECONOMIC STABILITY: TRANSPORTATION INSECURITY
IN THE PAST 12 MONTHS, HAS LACK OF TRANSPORTATION KEPT YOU FROM MEETINGS, WORK, OR FROM GETTING THINGS NEEDED FOR DAILY LIVING?: NO

## 2023-09-11 SDOH — ECONOMIC STABILITY: FOOD INSECURITY: WITHIN THE PAST 12 MONTHS, THE FOOD YOU BOUGHT JUST DIDN'T LAST AND YOU DIDN'T HAVE MONEY TO GET MORE.: NEVER TRUE

## 2023-09-11 SDOH — ECONOMIC STABILITY: HOUSING INSECURITY
IN THE LAST 12 MONTHS, WAS THERE A TIME WHEN YOU DID NOT HAVE A STEADY PLACE TO SLEEP OR SLEPT IN A SHELTER (INCLUDING NOW)?: NO

## 2023-09-11 SDOH — ECONOMIC STABILITY: INCOME INSECURITY: HOW HARD IS IT FOR YOU TO PAY FOR THE VERY BASICS LIKE FOOD, HOUSING, MEDICAL CARE, AND HEATING?: NOT VERY HARD

## 2023-09-11 ASSESSMENT — PATIENT HEALTH QUESTIONNAIRE - PHQ9
8. MOVING OR SPEAKING SO SLOWLY THAT OTHER PEOPLE COULD HAVE NOTICED. OR THE OPPOSITE - BEING SO FIDGETY OR RESTLESS THAT YOU HAVE BEEN MOVING AROUND A LOT MORE THAN USUAL: NOT AT ALL
SUM OF ALL RESPONSES TO PHQ QUESTIONS 1-9: 6
SUM OF ALL RESPONSES TO PHQ QUESTIONS 1-9: 6
6. FEELING BAD ABOUT YOURSELF - OR THAT YOU ARE A FAILURE OR HAVE LET YOURSELF OR YOUR FAMILY DOWN: 0
SUM OF ALL RESPONSES TO PHQ9 QUESTIONS 1 & 2: 2
1. LITTLE INTEREST OR PLEASURE IN DOING THINGS: SEVERAL DAYS
5. POOR APPETITE OR OVEREATING: 1
9. THOUGHTS THAT YOU WOULD BE BETTER OFF DEAD, OR OF HURTING YOURSELF: 0
6. FEELING BAD ABOUT YOURSELF - OR THAT YOU ARE A FAILURE OR HAVE LET YOURSELF OR YOUR FAMILY DOWN: NOT AT ALL
2. FEELING DOWN, DEPRESSED OR HOPELESS: SEVERAL DAYS
3. TROUBLE FALLING OR STAYING ASLEEP: 1
SUM OF ALL RESPONSES TO PHQ QUESTIONS 1-9: 6
SUM OF ALL RESPONSES TO PHQ QUESTIONS 1-9: 6
8. MOVING OR SPEAKING SO SLOWLY THAT OTHER PEOPLE COULD HAVE NOTICED. OR THE OPPOSITE, BEING SO FIGETY OR RESTLESS THAT YOU HAVE BEEN MOVING AROUND A LOT MORE THAN USUAL: 0
4. FEELING TIRED OR HAVING LITTLE ENERGY: SEVERAL DAYS
4. FEELING TIRED OR HAVING LITTLE ENERGY: 1
2. FEELING DOWN, DEPRESSED OR HOPELESS: 1
9. THOUGHTS THAT YOU WOULD BE BETTER OFF DEAD, OR OF HURTING YOURSELF: NOT AT ALL
10. IF YOU CHECKED OFF ANY PROBLEMS, HOW DIFFICULT HAVE THESE PROBLEMS MADE IT FOR YOU TO DO YOUR WORK, TAKE CARE OF THINGS AT HOME, OR GET ALONG WITH OTHER PEOPLE: NOT DIFFICULT AT ALL
7. TROUBLE CONCENTRATING ON THINGS, SUCH AS READING THE NEWSPAPER OR WATCHING TELEVISION: 1
5. POOR APPETITE OR OVEREATING: SEVERAL DAYS
7. TROUBLE CONCENTRATING ON THINGS, SUCH AS READING THE NEWSPAPER OR WATCHING TELEVISION: SEVERAL DAYS
SUM OF ALL RESPONSES TO PHQ QUESTIONS 1-9: 6
3. TROUBLE FALLING OR STAYING ASLEEP: SEVERAL DAYS
1. LITTLE INTEREST OR PLEASURE IN DOING THINGS: 1
10. IF YOU CHECKED OFF ANY PROBLEMS, HOW DIFFICULT HAVE THESE PROBLEMS MADE IT FOR YOU TO DO YOUR WORK, TAKE CARE OF THINGS AT HOME, OR GET ALONG WITH OTHER PEOPLE: 0

## 2023-09-14 ENCOUNTER — OFFICE VISIT (OUTPATIENT)
Dept: PRIMARY CARE CLINIC | Facility: CLINIC | Age: 63
End: 2023-09-14
Payer: MEDICARE

## 2023-09-14 VITALS
WEIGHT: 293 LBS | SYSTOLIC BLOOD PRESSURE: 156 MMHG | OXYGEN SATURATION: 91 % | TEMPERATURE: 97.2 F | HEART RATE: 73 BPM | BODY MASS INDEX: 53.92 KG/M2 | DIASTOLIC BLOOD PRESSURE: 89 MMHG | HEIGHT: 62 IN

## 2023-09-14 DIAGNOSIS — F33.0 DEPRESSION, MAJOR, RECURRENT, MILD (HCC): ICD-10-CM

## 2023-09-14 DIAGNOSIS — M17.12 PRIMARY OSTEOARTHRITIS OF LEFT KNEE: ICD-10-CM

## 2023-09-14 DIAGNOSIS — J41.8 MIXED SIMPLE AND MUCOPURULENT CHRONIC BRONCHITIS (HCC): ICD-10-CM

## 2023-09-14 DIAGNOSIS — R60.0 LOCALIZED EDEMA: ICD-10-CM

## 2023-09-14 DIAGNOSIS — I10 ESSENTIAL HYPERTENSION: ICD-10-CM

## 2023-09-14 DIAGNOSIS — T78.40XD ALLERGY, SUBSEQUENT ENCOUNTER: ICD-10-CM

## 2023-09-14 DIAGNOSIS — Z91.81 AT HIGH RISK FOR FALLS: ICD-10-CM

## 2023-09-14 DIAGNOSIS — N39.41 URGE INCONTINENCE OF URINE: ICD-10-CM

## 2023-09-14 DIAGNOSIS — E03.9 ACQUIRED HYPOTHYROIDISM: ICD-10-CM

## 2023-09-14 DIAGNOSIS — E11.22 TYPE 2 DIABETES MELLITUS WITH STAGE 3 CHRONIC KIDNEY DISEASE, WITHOUT LONG-TERM CURRENT USE OF INSULIN, UNSPECIFIED WHETHER STAGE 3A OR 3B CKD (HCC): ICD-10-CM

## 2023-09-14 DIAGNOSIS — F41.9 ANXIETY: ICD-10-CM

## 2023-09-14 DIAGNOSIS — Z00.00 MEDICARE ANNUAL WELLNESS VISIT, SUBSEQUENT: Primary | ICD-10-CM

## 2023-09-14 DIAGNOSIS — N18.30 TYPE 2 DIABETES MELLITUS WITH STAGE 3 CHRONIC KIDNEY DISEASE, WITHOUT LONG-TERM CURRENT USE OF INSULIN, UNSPECIFIED WHETHER STAGE 3A OR 3B CKD (HCC): ICD-10-CM

## 2023-09-14 DIAGNOSIS — E11.9 TYPE 2 DIABETES MELLITUS WITHOUT COMPLICATION, WITHOUT LONG-TERM CURRENT USE OF INSULIN (HCC): ICD-10-CM

## 2023-09-14 DIAGNOSIS — M17.11 PRIMARY OSTEOARTHRITIS OF RIGHT KNEE: ICD-10-CM

## 2023-09-14 DIAGNOSIS — F13.20 SEDATIVE, HYPNOTIC OR ANXIOLYTIC DEPENDENCE, UNCOMPLICATED (HCC): ICD-10-CM

## 2023-09-14 DIAGNOSIS — J01.00 ACUTE NON-RECURRENT MAXILLARY SINUSITIS: ICD-10-CM

## 2023-09-14 DIAGNOSIS — K21.9 GASTROESOPHAGEAL REFLUX DISEASE, UNSPECIFIED WHETHER ESOPHAGITIS PRESENT: ICD-10-CM

## 2023-09-14 PROCEDURE — G0439 PPPS, SUBSEQ VISIT: HCPCS | Performed by: FAMILY MEDICINE

## 2023-09-14 PROCEDURE — 3078F DIAST BP <80 MM HG: CPT | Performed by: FAMILY MEDICINE

## 2023-09-14 PROCEDURE — 3017F COLORECTAL CA SCREEN DOC REV: CPT | Performed by: FAMILY MEDICINE

## 2023-09-14 PROCEDURE — 3074F SYST BP LT 130 MM HG: CPT | Performed by: FAMILY MEDICINE

## 2023-09-14 PROCEDURE — 1123F ACP DISCUSS/DSCN MKR DOCD: CPT | Performed by: FAMILY MEDICINE

## 2023-09-14 PROCEDURE — 3044F HG A1C LEVEL LT 7.0%: CPT | Performed by: FAMILY MEDICINE

## 2023-09-14 RX ORDER — PIOGLITAZONEHYDROCHLORIDE 30 MG/1
30 TABLET ORAL DAILY
Qty: 90 TABLET | Refills: 3 | Status: SHIPPED | OUTPATIENT
Start: 2023-09-14

## 2023-09-14 RX ORDER — AMLODIPINE BESYLATE 10 MG/1
10 TABLET ORAL DAILY
Qty: 90 TABLET | Refills: 1 | Status: SHIPPED | OUTPATIENT
Start: 2023-09-14

## 2023-09-14 RX ORDER — CLONAZEPAM 1 MG/1
1 TABLET ORAL 2 TIMES DAILY
Qty: 60 TABLET | Refills: 2 | Status: SHIPPED | OUTPATIENT
Start: 2023-09-14 | End: 2023-12-13

## 2023-09-14 RX ORDER — FAMOTIDINE 20 MG/1
20 TABLET, FILM COATED ORAL 2 TIMES DAILY
Qty: 60 TABLET | Refills: 5 | Status: SHIPPED | OUTPATIENT
Start: 2023-09-14

## 2023-09-14 RX ORDER — FUROSEMIDE 20 MG/1
TABLET ORAL
Qty: 30 TABLET | Refills: 3 | Status: SHIPPED | OUTPATIENT
Start: 2023-09-14

## 2023-09-14 RX ORDER — FLUTICASONE PROPIONATE 50 MCG
2 SPRAY, SUSPENSION (ML) NASAL DAILY
Qty: 16 G | Refills: 5 | Status: SHIPPED | OUTPATIENT
Start: 2023-09-14

## 2023-09-14 RX ORDER — OMEPRAZOLE 40 MG/1
40 CAPSULE, DELAYED RELEASE ORAL DAILY
Qty: 90 CAPSULE | Refills: 3 | Status: SHIPPED | OUTPATIENT
Start: 2023-09-14

## 2023-09-14 RX ORDER — GLIMEPIRIDE 1 MG/1
1 TABLET ORAL
Qty: 90 TABLET | Refills: 3 | Status: SHIPPED | OUTPATIENT
Start: 2023-09-14

## 2023-09-14 RX ORDER — HYDROCHLOROTHIAZIDE 25 MG/1
25 TABLET ORAL DAILY
Qty: 90 TABLET | Refills: 3 | Status: SHIPPED | OUTPATIENT
Start: 2023-09-14

## 2023-09-14 RX ORDER — ALBUTEROL SULFATE 90 UG/1
2 AEROSOL, METERED RESPIRATORY (INHALATION) EVERY 4 HOURS PRN
Qty: 18 G | Refills: 5 | Status: SHIPPED | OUTPATIENT
Start: 2023-09-14

## 2023-09-14 RX ORDER — TIZANIDINE 2 MG/1
TABLET ORAL
Qty: 30 TABLET | Refills: 5 | Status: SHIPPED | OUTPATIENT
Start: 2023-09-14

## 2023-09-14 RX ORDER — DULOXETIN HYDROCHLORIDE 60 MG/1
60 CAPSULE, DELAYED RELEASE ORAL 2 TIMES DAILY
Qty: 90 CAPSULE | Refills: 1 | Status: SHIPPED | OUTPATIENT
Start: 2023-09-14

## 2023-09-14 RX ORDER — LOSARTAN POTASSIUM 100 MG/1
TABLET ORAL
Qty: 90 TABLET | Refills: 1 | Status: SHIPPED | OUTPATIENT
Start: 2023-09-14

## 2023-09-14 RX ORDER — LEVOTHYROXINE SODIUM 0.07 MG/1
75 TABLET ORAL
Qty: 90 TABLET | Refills: 1 | Status: SHIPPED | OUTPATIENT
Start: 2023-09-14

## 2023-09-14 RX ORDER — MONTELUKAST SODIUM 10 MG/1
10 TABLET ORAL DAILY
Qty: 90 TABLET | Refills: 3 | Status: SHIPPED | OUTPATIENT
Start: 2023-09-14

## 2023-09-14 ASSESSMENT — PATIENT HEALTH QUESTIONNAIRE - PHQ9
2. FEELING DOWN, DEPRESSED OR HOPELESS: 0
7. TROUBLE CONCENTRATING ON THINGS, SUCH AS READING THE NEWSPAPER OR WATCHING TELEVISION: 0
4. FEELING TIRED OR HAVING LITTLE ENERGY: 0
SUM OF ALL RESPONSES TO PHQ QUESTIONS 1-9: 0
9. THOUGHTS THAT YOU WOULD BE BETTER OFF DEAD, OR OF HURTING YOURSELF: 0
SUM OF ALL RESPONSES TO PHQ9 QUESTIONS 1 & 2: 0
10. IF YOU CHECKED OFF ANY PROBLEMS, HOW DIFFICULT HAVE THESE PROBLEMS MADE IT FOR YOU TO DO YOUR WORK, TAKE CARE OF THINGS AT HOME, OR GET ALONG WITH OTHER PEOPLE: 0
5. POOR APPETITE OR OVEREATING: 0
6. FEELING BAD ABOUT YOURSELF - OR THAT YOU ARE A FAILURE OR HAVE LET YOURSELF OR YOUR FAMILY DOWN: 0
8. MOVING OR SPEAKING SO SLOWLY THAT OTHER PEOPLE COULD HAVE NOTICED. OR THE OPPOSITE, BEING SO FIGETY OR RESTLESS THAT YOU HAVE BEEN MOVING AROUND A LOT MORE THAN USUAL: 0
3. TROUBLE FALLING OR STAYING ASLEEP: 0
1. LITTLE INTEREST OR PLEASURE IN DOING THINGS: 0
SUM OF ALL RESPONSES TO PHQ QUESTIONS 1-9: 0

## 2023-09-14 ASSESSMENT — LIFESTYLE VARIABLES: HOW MANY STANDARD DRINKS CONTAINING ALCOHOL DO YOU HAVE ON A TYPICAL DAY: PATIENT DOES NOT DRINK

## 2023-09-14 NOTE — PROGRESS NOTES
Natalee Calero MD  [address] [place] [address].  Ulus Goldberg, [facility]  Ph No:  [phone]  Fax:  [phone]    CHIEF COMPLAINT:  Chief Complaint   Patient presents with    Medicare AWV     Pt has no issues today. HISTORY OF PRESENT ILLNESS:  Ms. Peterson is a 70 y.o. female. Pt presents for Medicare wellness visit. Pt reports no specific medical concerns. Pt is morbidly obese, BMI 69.61, weight 380lbs with multiple medical problems. Pt is reviewed on the following labs:  Potassium 4.0 normal  GFR>60 normal kidney function  Fasting glucose 125, hgba1c 6.2% controlled diabetes  Total cholesterol 152, hdl 48, ldl 83.4 ratio of cardiac risk low 3.2  Liver enzymes normal alt 30, ast 18  Thyroid normal tsh 3.120, free t4 1.2  Hgb normal 13.4, mcv 100.4 normal. But may benefit from folic acid, green leafy vegetables. Pt needs all her medications refilled, as noted below:  Tizanidine for muscle spasm  Actos, glimepiride for diabetic management  Omeprazole for acid reflux  Montelukast for allergies  Losartan for BP management, BP is high 156/89, pt is also taking hctz, lasix and amlodipine for BP management. Pt is to check BP at home and if still >766 systolic, pt will need additional BP med  Flonase for nasals allergies, congestion clearance  Famotidine for acid reflux  Duloxetine for depression/anxiety  Klonopin for general anxiety disorder  Albuterol sulfate for asthma/copd maintenance  Myrbetriq for bladder spasms. Pt will RTC in 6 months, cmp, cbc, tsh, free t4, hgba1c, vit d, lipid      HISTORY:  Allergies   Allergen Reactions    Codeine Other (See Comments)     Pain in abdomen in empty stomach  Other reaction(s): GI upset    Sulfa Antibiotics Nausea Only    Levofloxacin Nausea And Vomiting     Sensitivity to medication, nausea and vomiting.      Past Medical History:   Diagnosis Date    Acquired hypothyroidism 12/27/2017    Anxiety     Arthritis     Depression      & ANXIETY

## 2023-09-15 DIAGNOSIS — M17.11 PRIMARY OSTEOARTHRITIS OF RIGHT KNEE: ICD-10-CM

## 2023-09-15 DIAGNOSIS — M17.12 PRIMARY OSTEOARTHRITIS OF LEFT KNEE: ICD-10-CM

## 2023-09-15 NOTE — PATIENT INSTRUCTIONS
Plaque can quickly build up on the teeth of older adults. Watch for the signs of gum disease. These signs include gums that bleed after brushing or after eating hard foods, such as apples. See a dentist regularly. Many experts recommend checkups every 6 months. Keep the dentist up to date on any new medications the person is taking. Encourage a balanced diet that includes whole grains, vegetables, and fruits, and that is low in saturated fat and sodium. Encourage the person you're caring for not to use tobacco products. They can affect dental and general health. Many older adults have a fixed income and feel that they can't afford dental care. But most towns and cities have programs in which dentists help older adults by lowering fees. Contact your area's public health offices or  for information about dental care in your area. Using a toothbrush  Older adults with arthritis sometimes have trouble brushing their teeth because they can't easily hold the toothbrush. Their hands and fingers may be stiff, painful, or weak. If this is the case, you can: Offer an electric toothbrush. Enlarge the handle of a non-electric toothbrush by wrapping a sponge, an elastic bandage, or adhesive tape around it. Push the toothbrush handle through a ball made of rubber or soft foam.  Make the handle longer and thicker by taping Popsicle sticks or tongue depressors to it. You may also be able to buy special toothbrushes, toothpaste dispensers, and floss holders. Your doctor may recommend a soft-bristle toothbrush if the person you care for bleeds easily. Bleeding can happen because of a health problem or from certain medicines. A toothpaste for sensitive teeth may help if the person you care for has sensitive teeth. How do you brush and floss someone's teeth? If the person you are caring for has a hard time cleaning their teeth on their own, you may need to brush and floss their teeth for them.  It may be

## 2023-09-18 RX ORDER — DULOXETIN HYDROCHLORIDE 60 MG/1
CAPSULE, DELAYED RELEASE ORAL
Qty: 180 CAPSULE | OUTPATIENT
Start: 2023-09-18

## 2023-09-20 RX ORDER — CELECOXIB 200 MG/1
200 CAPSULE ORAL EVERY MORNING
Qty: 90 CAPSULE | Refills: 1 | OUTPATIENT
Start: 2023-09-20

## 2023-09-20 RX ORDER — TIZANIDINE 2 MG/1
TABLET ORAL
Qty: 90 TABLET | Refills: 1 | OUTPATIENT
Start: 2023-09-20

## 2023-09-20 RX ORDER — DULOXETIN HYDROCHLORIDE 60 MG/1
60 CAPSULE, DELAYED RELEASE ORAL 2 TIMES DAILY
Qty: 90 CAPSULE | Refills: 1 | OUTPATIENT
Start: 2023-09-20

## 2023-11-15 ENCOUNTER — E-VISIT (OUTPATIENT)
Dept: PRIMARY CARE CLINIC | Facility: CLINIC | Age: 63
End: 2023-11-15

## 2023-11-15 DIAGNOSIS — E11.9 TYPE 2 DIABETES MELLITUS WITHOUT COMPLICATION, WITHOUT LONG-TERM CURRENT USE OF INSULIN (HCC): Primary | ICD-10-CM

## 2023-11-15 NOTE — PROGRESS NOTES
Milton Moore (1960) initiated an asynchronous digital communication through [facility]. HPI: per patient questionnaire     Exam: not applicable    Diagnoses and all orders for this visit:  Diagnoses and all orders for this visit:    Type 2 diabetes mellitus without complication, without long-term current use of insulin ([address])    Pt is advised she is doing well on current diabetic therapy, hgba1c is 6.2%, well controlled diabetes, GFR>60 healthy kidney function. Total cholesterol 152, cardiac risk low 3.2. Pt will RTC at next scheduled appt. Pt will contact physician office for med refills, as needed. Time: EV2 - 11-20 minutes were spent on the digital evaluation and management of this patient.      Tiffany Trotter MD

## 2023-11-27 ENCOUNTER — TELEPHONE (OUTPATIENT)
Dept: PRIMARY CARE CLINIC | Facility: CLINIC | Age: 63
End: 2023-11-27

## 2023-11-27 NOTE — TELEPHONE ENCOUNTER
----- Message from Glenroy Sorensen [place] sent at 11/27/2023 10:38 AM EST -----  Regarding: FW: Earache   Contact: [phone]  Patient needs an appt for both concerns, please get her scheduled. If nothing available soon, she can do evisit   ----- Message -----  From: Prem Cally  Sent: 11/27/2023  12:48 AM EST  To: [facility]  Subject: Earache                                          I am having a real bad pain in my right ear that comes and goes. Hurts real bad for a few mins and then goes away. My face has been hurting and feeling like it is swollen. Thought I would get this taken care of before it gets worse. I also wanted to ask you if you thought I might benefit from one of the diabetic shots that are helping so many people lose weight. I know there are no miracle drugs but,  every pound gone is one less I have to carry around. Let me know if I need to come in or schedule an evisit. I will do accordingly. Thanks,  Kaylee Fears.

## 2023-11-29 ENCOUNTER — TELEMEDICINE (OUTPATIENT)
Dept: PRIMARY CARE CLINIC | Facility: CLINIC | Age: 63
End: 2023-11-29
Payer: MEDICARE

## 2023-11-29 DIAGNOSIS — I10 ESSENTIAL HYPERTENSION: Primary | ICD-10-CM

## 2023-11-29 DIAGNOSIS — E11.22 TYPE 2 DIABETES MELLITUS WITH STAGE 3A CHRONIC KIDNEY DISEASE, WITHOUT LONG-TERM CURRENT USE OF INSULIN (HCC): ICD-10-CM

## 2023-11-29 DIAGNOSIS — N18.31 TYPE 2 DIABETES MELLITUS WITH STAGE 3A CHRONIC KIDNEY DISEASE, WITHOUT LONG-TERM CURRENT USE OF INSULIN (HCC): ICD-10-CM

## 2023-11-29 DIAGNOSIS — H66.91 OM (OTITIS MEDIA), RECURRENT, RIGHT: ICD-10-CM

## 2023-11-29 DIAGNOSIS — M79.601 RIGHT ARM PAIN: ICD-10-CM

## 2023-11-29 DIAGNOSIS — J32.0 RIGHT MAXILLARY SINUSITIS: ICD-10-CM

## 2023-11-29 PROCEDURE — 99442 PR PHYS/QHP TELEPHONE EVALUATION 11-20 MIN: CPT | Performed by: FAMILY MEDICINE

## 2023-11-29 RX ORDER — TIZANIDINE 4 MG/1
4 TABLET ORAL 3 TIMES DAILY PRN
Qty: 90 TABLET | Refills: 2 | Status: SHIPPED | OUTPATIENT
Start: 2023-11-29

## 2023-11-29 RX ORDER — METHYLPREDNISOLONE 4 MG/1
TABLET ORAL
Qty: 1 KIT | Refills: 0 | Status: SHIPPED | OUTPATIENT
Start: 2023-11-29 | End: 2023-12-05

## 2023-11-29 RX ORDER — DULAGLUTIDE 0.75 MG/.5ML
0.75 INJECTION, SOLUTION SUBCUTANEOUS WEEKLY
Qty: 4 ADJUSTABLE DOSE PRE-FILLED PEN SYRINGE | Refills: 3 | Status: SHIPPED | OUTPATIENT
Start: 2023-11-29

## 2023-11-29 RX ORDER — AMOXICILLIN AND CLAVULANATE POTASSIUM 875; 125 MG/1; MG/1
1 TABLET, FILM COATED ORAL 2 TIMES DAILY
Qty: 20 TABLET | Refills: 0 | Status: SHIPPED | OUTPATIENT
Start: 2023-11-29 | End: 2023-12-09

## 2023-11-29 NOTE — PROGRESS NOTES
Documentation:  I communicated with the patient and/or health care decision maker about medication refills. Right Ear Pain:    Pt reports Ear ache - intermittent, right ear, last a few minutes. But then comes back. No dizziness  Drainage in throat, with face swelling, pain down right side of neck. Pt is advised she apparently has right OM with some right maxillary sinus involvement with drainage down neck from congestion. Pt is treated with augmentin 875mg po bid, medrol dose pack and is to use flonase for nasal clearance. Pt is advised it may be 3-7 days before she begins to see full improvement. Diabetes Type II sent Trulicity. Pt hgba1c is 6.2%. Pt would like to reduce glucose and also lose weight. Pt is morbidly obese. BMI 69.61, weight 380lbs. Pt is sent Trulicity to use once weekly. Pt will RTC 3/11/24 or earlier if needed. Details of this discussion including any medical advice provided: as noted. Total Time: minutes: 11-20 minutes    López Sena was evaluated through a synchronous (real-time) audio encounter. Patient identification was verified at the start of the visit. She (or guardian if applicable) is aware that this is a billable service, which includes applicable co-pays. This visit was conducted with the patient's (and/or legal guardian's) verbal consent. She has not had a related appointment within my department in the past 7 days or scheduled within the next 24 hours. The patient was located at Home: [address] [place] [address]. The provider was located at [facility] ([address] [place] [address]): [address] [facility],  [address] [place] [address]. Note: not billable if this call serves to triage the patient into an appointment for the relevant concern    López Sena is a 70 y.o. female evaluated via telephone on 11/29/2023 for No chief complaint on file.   Shahzad Cervantes MD

## 2023-12-05 DIAGNOSIS — J01.00 ACUTE NON-RECURRENT MAXILLARY SINUSITIS: ICD-10-CM

## 2023-12-05 DIAGNOSIS — J41.8 MIXED SIMPLE AND MUCOPURULENT CHRONIC BRONCHITIS (HCC): Primary | ICD-10-CM

## 2023-12-05 RX ORDER — AZITHROMYCIN 500 MG/1
500 TABLET, FILM COATED ORAL DAILY
Qty: 6 PACKET | Refills: 0 | Status: SHIPPED | OUTPATIENT
Start: 2023-12-05 | End: 2023-12-11

## 2023-12-27 DIAGNOSIS — F33.0 DEPRESSION, MAJOR, RECURRENT, MILD (HCC): ICD-10-CM

## 2023-12-28 DIAGNOSIS — F33.0 DEPRESSION, MAJOR, RECURRENT, MILD (HCC): ICD-10-CM

## 2023-12-28 RX ORDER — DULOXETIN HYDROCHLORIDE 60 MG/1
60 CAPSULE, DELAYED RELEASE ORAL 2 TIMES DAILY
Qty: 90 CAPSULE | Refills: 1 | OUTPATIENT
Start: 2023-12-28

## 2023-12-28 NOTE — TELEPHONE ENCOUNTER
[facility].    [phone]

## 2023-12-29 RX ORDER — DULOXETIN HYDROCHLORIDE 60 MG/1
60 CAPSULE, DELAYED RELEASE ORAL 2 TIMES DAILY
Qty: 180 CAPSULE | Refills: 0 | Status: SHIPPED | OUTPATIENT
Start: 2023-12-29

## 2024-03-03 DIAGNOSIS — I10 ESSENTIAL HYPERTENSION: ICD-10-CM

## 2024-03-04 DIAGNOSIS — K21.9 GASTROESOPHAGEAL REFLUX DISEASE, UNSPECIFIED WHETHER ESOPHAGITIS PRESENT: ICD-10-CM

## 2024-03-04 RX ORDER — OMEPRAZOLE 40 MG/1
CAPSULE, DELAYED RELEASE ORAL DAILY
Qty: 90 CAPSULE | Refills: 3 | OUTPATIENT
Start: 2024-03-04

## 2024-03-04 RX ORDER — AMLODIPINE BESYLATE 10 MG/1
10 TABLET ORAL DAILY
Qty: 90 TABLET | Refills: 0 | Status: SHIPPED | OUTPATIENT
Start: 2024-03-04

## 2024-03-06 NOTE — PROGRESS NOTES
lisinopril (ZESTRIL) 20 mg tablet     [facility]   Problem: Falls - Risk of  Goal: *Absence of Falls  Description: Document Raúl Herring Fall Risk and appropriate interventions in the flowsheet.   Outcome: Progressing Towards Goal  Note: Fall Risk Interventions:  Mobility Interventions: Bed/chair exit alarm, Communicate number of staff needed for ambulation/transfer, OT consult for ADLs, Patient to call before getting OOB, PT Consult for mobility concerns    Mentation Interventions: Bed/chair exit alarm, Door open when patient unattended, Increase mobility    Medication Interventions: Bed/chair exit alarm, Patient to call before getting OOB, Teach patient to arise slowly    Elimination Interventions: Call light in reach, Patient to call for help with toileting needs, Stay With Me (per policy), Toilet paper/wipes in reach, Toileting schedule/hourly rounds              Problem: Patient Education: Go to Patient Education Activity  Goal: Patient/Family Education  Outcome: Progressing Towards Goal     Problem: General Medical Care Plan  Goal: *Vital signs within specified parameters  Outcome: Progressing Towards Goal  Goal: *Labs within defined limits  Outcome: Progressing Towards Goal  Goal: *Absence of infection signs and symptoms  Outcome: Progressing Towards Goal  Goal: *Optimal pain control at patient's stated goal  Outcome: Progressing Towards Goal  Goal: *Skin integrity maintained  Outcome: Progressing Towards Goal  Goal: *Fluid volume balance  Outcome: Progressing Towards Goal  Goal: *Optimize nutritional status  Outcome: Progressing Towards Goal  Goal: *Anxiety reduced or absent  Outcome: Progressing Towards Goal  Goal: *Progressive mobility and function (eg: ADL's)  Outcome: Progressing Towards Goal     Problem: Patient Education: Go to Patient Education Activity  Goal: Patient/Family Education  Outcome: Progressing Towards Goal     Problem: Gas Exchange - Impaired  Goal: *Absence of hypoxia  Outcome: Progressing Towards Goal     Problem: Pressure Injury - Risk of  Goal: *Prevention of pressure injury  Description: Document Randal Scale and appropriate interventions in the flowsheet.   Outcome: Progressing Towards Goal  Note: Pressure Injury Interventions:  Sensory Interventions: Assess changes in LOC, Avoid rigorous massage over bony prominences    Moisture Interventions: Absorbent underpads, Limit adult briefs, Minimize layers    Activity Interventions: Increase time out of bed, Pressure redistribution bed/mattress(bed type), PT/OT evaluation    Mobility Interventions: HOB 30 degrees or less, Pressure redistribution bed/mattress (bed type), PT/OT evaluation    Nutrition Interventions: Document food/fluid/supplement intake, Offer support with meals,snacks and hydration    Friction and Shear Interventions: Apply protective barrier, creams and emollients, Foam dressings/transparent film/skin sealants, HOB 30 degrees or less, Lift sheet, Minimize layers                Problem: Patient Education: Go to Patient Education Activity  Goal: Patient/Family Education  Outcome: Progressing Towards Goal     Problem: Patient Education: Go to Patient Education Activity  Goal: Patient/Family Education  Outcome: Progressing Towards Goal

## 2024-03-11 DIAGNOSIS — R79.9 ABNORMAL BLOOD CHEMISTRY: ICD-10-CM

## 2024-03-11 DIAGNOSIS — Z13.228 SCREENING FOR METABOLIC DISORDER: ICD-10-CM

## 2024-03-11 DIAGNOSIS — R53.83 FATIGUE, UNSPECIFIED TYPE: ICD-10-CM

## 2024-03-11 DIAGNOSIS — E78.5 HYPERLIPIDEMIA, UNSPECIFIED HYPERLIPIDEMIA TYPE: ICD-10-CM

## 2024-03-11 DIAGNOSIS — R94.6 NONSPECIFIC ABNORMAL RESULTS OF THYROID FUNCTION STUDY: ICD-10-CM

## 2024-03-11 DIAGNOSIS — E11.9 TYPE 2 DIABETES MELLITUS WITHOUT COMPLICATION, WITHOUT LONG-TERM CURRENT USE OF INSULIN (HCC): Primary | ICD-10-CM

## 2024-03-11 DIAGNOSIS — E11.9 TYPE 2 DIABETES MELLITUS WITHOUT COMPLICATION, WITHOUT LONG-TERM CURRENT USE OF INSULIN (HCC): ICD-10-CM

## 2024-03-11 LAB
ALBUMIN SERPL-MCNC: 3.6 G/DL (ref 3.2–4.6)
ALBUMIN/GLOB SERPL: 1.1 (ref 0.4–1.6)
ALP SERPL-CCNC: 152 U/L (ref 50–136)
ALT SERPL-CCNC: 28 U/L (ref 12–65)
ANION GAP SERPL CALC-SCNC: 9 MMOL/L (ref 2–11)
AST SERPL-CCNC: 13 U/L (ref 15–37)
BASOPHILS # BLD: 0 K/UL (ref 0–0.2)
BASOPHILS NFR BLD: 1 % (ref 0–2)
BILIRUB SERPL-MCNC: 0.5 MG/DL (ref 0.2–1.1)
BUN SERPL-MCNC: 28 MG/DL (ref 8–23)
CALCIUM SERPL-MCNC: 9.1 MG/DL (ref 8.3–10.4)
CHLORIDE SERPL-SCNC: 102 MMOL/L (ref 103–113)
CHOLEST SERPL-MCNC: 169 MG/DL
CO2 SERPL-SCNC: 28 MMOL/L (ref 21–32)
CREAT SERPL-MCNC: 1.1 MG/DL (ref 0.6–1)
DIFFERENTIAL METHOD BLD: NORMAL
EOSINOPHIL # BLD: 0.1 K/UL (ref 0–0.8)
EOSINOPHIL NFR BLD: 1 % (ref 0.5–7.8)
ERYTHROCYTE [DISTWIDTH] IN BLOOD BY AUTOMATED COUNT: 13.3 % (ref 11.9–14.6)
EST. AVERAGE GLUCOSE BLD GHB EST-MCNC: 117 MG/DL
GLOBULIN SER CALC-MCNC: 3.3 G/DL (ref 2.8–4.5)
GLUCOSE SERPL-MCNC: 133 MG/DL (ref 65–100)
HBA1C MFR BLD: 5.7 % (ref 4.8–5.6)
HCT VFR BLD AUTO: 42.5 % (ref 35.8–46.3)
HDLC SERPL-MCNC: 47 MG/DL (ref 40–60)
HDLC SERPL: 3.6
HGB BLD-MCNC: 13.4 G/DL (ref 11.7–15.4)
IMM GRANULOCYTES # BLD AUTO: 0.1 K/UL (ref 0–0.5)
IMM GRANULOCYTES NFR BLD AUTO: 1 % (ref 0–5)
LDLC SERPL CALC-MCNC: 105.2 MG/DL
LYMPHOCYTES # BLD: 1.7 K/UL (ref 0.5–4.6)
LYMPHOCYTES NFR BLD: 21 % (ref 13–44)
MCH RBC QN AUTO: 31.4 PG (ref 26.1–32.9)
MCHC RBC AUTO-ENTMCNC: 31.5 G/DL (ref 31.4–35)
MCV RBC AUTO: 99.5 FL (ref 82–102)
MONOCYTES # BLD: 0.7 K/UL (ref 0.1–1.3)
MONOCYTES NFR BLD: 9 % (ref 4–12)
NEUTS SEG # BLD: 5.6 K/UL (ref 1.7–8.2)
NEUTS SEG NFR BLD: 67 % (ref 43–78)
NRBC # BLD: 0 K/UL (ref 0–0.2)
PLATELET # BLD AUTO: 251 K/UL (ref 150–450)
PMV BLD AUTO: 11 FL (ref 9.4–12.3)
POTASSIUM SERPL-SCNC: 3.8 MMOL/L (ref 3.5–5.1)
PROT SERPL-MCNC: 6.9 G/DL (ref 6.3–8.2)
RBC # BLD AUTO: 4.27 M/UL (ref 4.05–5.2)
SODIUM SERPL-SCNC: 139 MMOL/L (ref 136–146)
T4 FREE SERPL-MCNC: 1.2 NG/DL (ref 0.78–1.46)
TRIGL SERPL-MCNC: 84 MG/DL (ref 35–150)
TSH, 3RD GENERATION: 1.83 UIU/ML (ref 0.36–3.74)
VLDLC SERPL CALC-MCNC: 16.8 MG/DL (ref 6–23)
WBC # BLD AUTO: 8.3 K/UL (ref 4.3–11.1)

## 2024-03-13 NOTE — PROGRESS NOTES
H&P/Consult Note/Progress Note/Office Note:   José Miguel Cintron  MRN: [id number]  :1960  Age:59 y.o.    HPI: José Miguel Cintron is a 62 y.o. female who is seen for SBO. She developed nausea/vomiting with abdominal pain symptoms 12 days ago, she has been in and out of [facility], was just discharged two days ago, but her symptoms returned today with nausea, vomiting. Her last BM and flatus was about 2 days ago. Her outside CT reports SBO with possible incarcerated hernia. She is extremely obese with BMI 63. She had numerous abdominal surgeries including open gastric bypass, open rashid. Her other medical issues are reviewed as below. 2020: Awake in bed, no complaints. Abd obese but soft. AF, VSS. +flatus. No BM. NG output clear with brown tinge--550mL out last 24h.   2020 Awake, no complaints. Abd soft. AF, VSS. Awaiting SBFT.  1L NGT output/24h    Past Medical History:   Diagnosis Date    Acquired hypothyroidism 2017    Anxiety     Arthritis     Depression      & ANXIETY    Fibromyalgia     Hypertension 1977    Obesity, morbid ([address]     Psychiatric disorder     Anxiety disorder    Psychiatric disorder     depression    Sleep apnea     uses c-pap, non compliant    Type 2 diabetes mellitus without complication, without long-term current use of insulin ([address] 2017    Unspecified adverse effect of anesthesia     after gastric bypass, woke up on vent     Past Surgical History:   Procedure Laterality Date    HX APPENDECTOMY      HX CHOLECYSTECTOMY      HX DILATION AND CURETTAGE      X 2    HX GASTRIC BYPASS      Dr. Rebeca iDnh    rt knee has screw in knee tendons streched    HX ORTHOPAEDIC  2009    Right knee replacement    [place] UNS ORAL SURG PROC BY REPORT       Current Facility-Administered Medications   Medication Dose Route Frequency    phenol throat spray (CHLORASEPTIC) 1 Spray  1 [place] Oral PRN    budesonide-formoteroL (SYMBICORT) 160-4.5 mcg/actuation HFA inhaler 2 Puff  2 Puff Inhalation BID RT    fluticasone propionate (FLONASE) 50 mcg/actuation nasal spray 2 Spray  2 Spray Both Nostrils DAILY    sodium chloride (NS) flush 5-40 mL  5-40 mL IntraVENous Q8H    sodium chloride (NS) flush 5-40 mL  5-40 mL IntraVENous PRN    naloxone (NARCAN) injection 0.4 mg  0.4 mg IntraVENous PRN    morphine 10 mg/ml injection 5 mg  5 mg IntraVENous Q4H PRN    ondansetron (ZOFRAN) injection 4 mg  4 mg IntraVENous Q4H PRN    magnesium hydroxide (MILK OF MAGNESIA) 400 mg/5 mL oral suspension 30 mL  30 mL Oral DAILY PRN    heparin (porcine) injection 5,000 Units  5,000 Units SubCUTAneous Q8H    hydrALAZINE (APRESOLINE) 20 mg/mL injection 20 mg  20 mg IntraVENous Q6H PRN    [START ON 7/5/2020] levothyroxine (SYNTHROID) injection 56 mcg  56 mcg IntraVENous DAILY     Codeine and Sulfa (sulfonamide antibiotics)  Social History     Socioeconomic History    Marital status:      Spouse name: Not on file    Number of children: Not on file    Years of education: Not on file    Highest education level: Not on file   Tobacco Use    Smoking status: Never Smoker    Smokeless tobacco: Never Used   Substance and Sexual Activity    Alcohol use: No    Drug use: No     Social History     Tobacco Use   Smoking Status Never Smoker   Smokeless Tobacco Never Used     Family History   Problem Relation Age of Onset    Post-op Nausea/Vomiting Mother     COPD Mother     Heart Disease Mother     Heart Attack Mother     Arthritis-rheumatoid Mother     Diabetes Mother     Hypertension Mother     Heart Disease Father     Cancer Father     Heart Attack Father     Hypertension Father     Elevated Lipids Father     Hypertension Sister     Heart Attack Brother     Diabetes Maternal Grandfather     Ovarian Cancer Paternal Grandmother      ROS: The patient has no difficulty with chest pain or shortness of breath. No fever or chills. Comprehensive review of systems was otherwise unremarkable except as noted above. Physical Exam:   Visit Vitals  /84 (BP 1 Location: Right arm, BP Patient Position: At rest)   Pulse 67   Temp 97.8 °F (36.6 °C)   Resp 20   Ht 5' 2\" (1.575 m)   Wt (!) 377 lb 12.8 oz (171.4 kg)   SpO2 94%   BMI 69.10 kg/m²     Vitals:    06/29/20 2253 06/30/20 0342 06/30/20 0734 06/30/20 0741   BP: 181/77 155/76 175/84    Pulse: 72 71 67    Resp: 19 19 20    Temp: 97.9 °F (36.6 °C) 98.1 °F (36.7 °C) 97.8 °F (36.6 °C)    SpO2: 95% 94% 95% 94%   Weight:       Height:         No intake/output data recorded. 06/28 1901 - 06/30 0700  In: -   Out: 1000     Constitutional: Alert, oriented, cooperative patient in no acute distress; appears stated age    Eyes:Sclera are clear. EOMs intact  ENMT: no external lesions gross hearing normal; no obvious neck masses, no ear or lip lesions, nares normal  CV: RRR. Normal perfusion  Resp: No JVD. Breathing is  non-labored; no audible wheezing. GI: soft, obese. A large left abdominal wall hernia, which is softer per her. Mild tenderness at low abdomen. No peritoneal signs. Musculoskeletal: unremarkable with normal function. No embolic signs or cyanosis.    Neuro:  Oriented; moves all 4; no focal deficits  Psychiatric: normal affect and mood, no memory impairment    Recent vitals (if inpt):  Patient Vitals for the past 24 hrs:   BP Temp Pulse Resp SpO2 Height Weight   06/30/20 0741     94 %     06/30/20 0734 175/84 97.8 °F (36.6 °C) 67 20 95 %     06/30/20 0342 155/76 98.1 °F (36.7 °C) 71 19 94 %     06/29/20 2253 181/77 97.9 °F (36.6 °C) 72 19 95 %     06/29/20 2014 171/81 98.6 °F (37 °C) 80 19 94 %     06/29/20 2002     94 %     06/29/20 1627 162/74 98.4 °F (36.9 °C) 78 19 94 %     06/29/20 1346      5' 2\" (1.575 m) (!) 377 lb 12.8 oz (171.4 kg)   06/29/20 1345     95 %     06/29/20 1233 163/83 98.1 °F (36.7 °C) 75 20 95 %         Labs:  Recent Labs     06/30/20  0515   WBC 8.3   HGB 13.7         K 3.6      CO2 28   BUN 12   CREA 0.66   GLU 92   TBILI 0.6   ALT 22          Lab Results   Component Value Date/Time    WBC 8.3 06/30/2020 05:15 AM    HGB 13.7 06/30/2020 05:15 AM    PLATELET 726 36/15/7022 05:15 AM    Sodium 140 06/30/2020 05:15 AM    Potassium 3.6 06/30/2020 05:15 AM    Chloride 100 06/30/2020 05:15 AM    CO2 28 06/30/2020 05:15 AM    BUN 12 06/30/2020 05:15 AM    Creatinine 0.66 06/30/2020 05:15 AM    Glucose 92 06/30/2020 05:15 AM    INR 1.2 10/29/2009 05:05 AM    aPTT 26.4 10/21/2009 10:00 AM    Bilirubin, total 0.6 06/30/2020 05:15 AM    ALT (SGPT) 22 06/30/2020 05:15 AM    Alk. phosphatase 120 06/30/2020 05:15 AM       CT Results  (Last 48 hours)    None        chest X-ray      I reviewed recent labs, recent radiologic studies, and pertinent records including other doctor notes if needed. I independently reviewed radiology images for studies I described above or studies I have ordered.    Admission date (for inpatients): 6/27/2020   * No surgery found *  * No surgery found *    ASSESSMENT/PLAN:  Problem List  Date Reviewed: 2/27/2020          Codes Class Noted    Hypokalemia ICD-10-CM: E87.6  ICD-9-CM: 276.8  6/28/2020        * (Principal) Small bowel obstruction ([facility] ICD-10-CM: P07.388  ICD-9-CM: 560.9  6/27/2020        FORREST (obstructive sleep apnea) ICD-10-CM: G47.33  ICD-9-CM: 327.23  6/27/2020        Chronic bilateral low back pain ICD-10-CM: M54.5, G89.29  ICD-9-CM: 724.2, 338.29  6/27/2020        Type 2 diabetes with nephropathy [facility]) ICD-10-CM: E11.21  ICD-9-CM: 250.40, 583.81  2/27/2020        Depression, major, recurrent, mild (HCC) ICD-10-CM: F33.0  ICD-9-CM: 296.31  2/27/2020        Shortness of breath on exertion ICD-10-CM: R06.02  ICD-9-CM: 786.05  11/25/2019        Morbid obesity with BMI of 70 and over, adult ([facility] ICD-10-CM: E66.01, Z68.45  ICD-9-CM: 278.01, V85.45  12/12/2018        Poor tolerance for ambulation ICD-10-CM: Z78.9  ICD-9-CM: V49.89  10/11/2018        Type 2 diabetes mellitus with diabetic neuropathy ([facility] ICD-10-CM: E11.40  ICD-9-CM: 250.60, 357.2  9/24/2018        Obesity, morbid ([facility] ICD-10-CM: E66.01  ICD-9-CM: 278.01  12/27/2017        Restless legs syndrome ICD-10-CM: G25.81  ICD-9-CM: 333.94  12/27/2017        Anxiety ICD-10-CM: F41.9  ICD-9-CM: 300.00  12/27/2017        Essential hypertension ICD-10-CM: I10  ICD-9-CM: 401.9  12/27/2017        Migraine without status migrainosus, not intractable ICD-10-CM: N58.904  ICD-9-CM: 346.90  12/27/2017        Type 2 diabetes mellitus without complication, without long-term current use of insulin ([facility] ICD-10-CM: E11.9  ICD-9-CM: 250.00  12/27/2017        Acquired hypothyroidism ICD-10-CM: E03.9  ICD-9-CM: 244.9  12/27/2017        Osteoarthritis of left knee ICD-10-CM: M17.12  ICD-9-CM: 715.96  10/26/2009        S/P total knee replacement using cement ICD-10-CM: Z96.659  ICD-9-CM: V43.65  10/26/2009        Acute blood loss anemia ICD-10-CM: D62  ICD-9-CM: 285.1  7/28/2009        Osteoarthritis of right knee ICD-10-CM: M17.11  ICD-9-CM: 715.96  7/27/2009        S/P total knee replacement using cement ICD-10-CM: Z96.659  ICD-9-CM: V43.65  7/27/2009            Principal Problem:    Small bowel obstruction ([facility] (6/27/2020)    Active Problems:    Obesity, morbid ([facility] (12/27/2017)      Essential hypertension (12/27/2017)      Type 2 diabetes mellitus with diabetic neuropathy ([facility] (9/24/2018)      FORREST (obstructive sleep apnea) (6/27/2020)      Chronic bilateral low back pain (6/27/2020)      Hypokalemia (6/28/2020)       Care management per primary team  NPO  IVFs  NGT to LIWS  Monitor labs, replace lytes PRN  Serial exams  Await SBFT  Hopeful for conservative management  Will continue to monitor     Bette Olsen NP Cellulitis

## 2024-03-25 DIAGNOSIS — F33.0 DEPRESSION, MAJOR, RECURRENT, MILD (HCC): ICD-10-CM

## 2024-03-25 RX ORDER — DULOXETIN HYDROCHLORIDE 60 MG/1
60 CAPSULE, DELAYED RELEASE ORAL 2 TIMES DAILY
Qty: 180 CAPSULE | Refills: 0 | OUTPATIENT
Start: 2024-03-25

## 2024-03-26 ENCOUNTER — OFFICE VISIT (OUTPATIENT)
Dept: PRIMARY CARE CLINIC | Facility: CLINIC | Age: 64
End: 2024-03-26
Payer: MEDICARE

## 2024-03-26 VITALS
WEIGHT: 293 LBS | DIASTOLIC BLOOD PRESSURE: 43 MMHG | TEMPERATURE: 97.9 F | HEIGHT: 62 IN | SYSTOLIC BLOOD PRESSURE: 118 MMHG | BODY MASS INDEX: 53.92 KG/M2 | HEART RATE: 64 BPM

## 2024-03-26 DIAGNOSIS — T78.40XS ALLERGY, SEQUELA: ICD-10-CM

## 2024-03-26 DIAGNOSIS — Z79.899 HIGH RISK MEDICATION USE: ICD-10-CM

## 2024-03-26 DIAGNOSIS — Z02.83 ENCOUNTER FOR DRUG SCREENING: ICD-10-CM

## 2024-03-26 DIAGNOSIS — Z78.0 MENOPAUSE: ICD-10-CM

## 2024-03-26 DIAGNOSIS — Z12.11 COLON CANCER SCREENING: ICD-10-CM

## 2024-03-26 DIAGNOSIS — F13.20 SEDATIVE, HYPNOTIC OR ANXIOLYTIC DEPENDENCE, UNCOMPLICATED (HCC): ICD-10-CM

## 2024-03-26 DIAGNOSIS — Z13.820 OSTEOPOROSIS SCREENING: ICD-10-CM

## 2024-03-26 DIAGNOSIS — E11.22 TYPE 2 DIABETES MELLITUS WITH STAGE 3A CHRONIC KIDNEY DISEASE, WITHOUT LONG-TERM CURRENT USE OF INSULIN (HCC): Primary | ICD-10-CM

## 2024-03-26 DIAGNOSIS — F41.9 ANXIETY: ICD-10-CM

## 2024-03-26 DIAGNOSIS — Z12.31 BREAST CANCER SCREENING BY MAMMOGRAM: ICD-10-CM

## 2024-03-26 DIAGNOSIS — R05.1 ACUTE COUGH: ICD-10-CM

## 2024-03-26 DIAGNOSIS — N18.31 TYPE 2 DIABETES MELLITUS WITH STAGE 3A CHRONIC KIDNEY DISEASE, WITHOUT LONG-TERM CURRENT USE OF INSULIN (HCC): Primary | ICD-10-CM

## 2024-03-26 PROBLEM — E66.01 MORBID OBESITY WITH BMI OF 70 AND OVER, ADULT (HCC): Status: RESOLVED | Noted: 2018-12-12 | Resolved: 2024-03-26

## 2024-03-26 PROBLEM — J01.00 ACUTE NON-RECURRENT MAXILLARY SINUSITIS: Status: RESOLVED | Noted: 2023-03-14 | Resolved: 2024-03-26

## 2024-03-26 PROBLEM — H66.004 RECURRENT ACUTE SUPPURATIVE OTITIS MEDIA OF RIGHT EAR WITHOUT SPONTANEOUS RUPTURE OF TYMPANIC MEMBRANE: Status: RESOLVED | Noted: 2022-03-05 | Resolved: 2024-03-26

## 2024-03-26 PROBLEM — E11.9 TYPE 2 DIABETES MELLITUS WITHOUT COMPLICATION, WITHOUT LONG-TERM CURRENT USE OF INSULIN (HCC): Status: RESOLVED | Noted: 2017-12-27 | Resolved: 2024-03-26

## 2024-03-26 PROCEDURE — 99214 OFFICE O/P EST MOD 30 MIN: CPT | Performed by: NURSE PRACTITIONER

## 2024-03-26 PROCEDURE — 1123F ACP DISCUSS/DSCN MKR DOCD: CPT | Performed by: NURSE PRACTITIONER

## 2024-03-26 PROCEDURE — 3044F HG A1C LEVEL LT 7.0%: CPT | Performed by: NURSE PRACTITIONER

## 2024-03-26 PROCEDURE — 3074F SYST BP LT 130 MM HG: CPT | Performed by: NURSE PRACTITIONER

## 2024-03-26 PROCEDURE — 3078F DIAST BP <80 MM HG: CPT | Performed by: NURSE PRACTITIONER

## 2024-03-26 RX ORDER — DULAGLUTIDE 0.75 MG/.5ML
0.75 INJECTION, SOLUTION SUBCUTANEOUS WEEKLY
Qty: 4 ADJUSTABLE DOSE PRE-FILLED PEN SYRINGE | Refills: 3 | Status: CANCELLED | OUTPATIENT
Start: 2024-03-26

## 2024-03-26 RX ORDER — BROMPHENIRAMINE MALEATE, PSEUDOEPHEDRINE HYDROCHLORIDE, AND DEXTROMETHORPHAN HYDROBROMIDE 2; 30; 10 MG/5ML; MG/5ML; MG/5ML
2.5 SYRUP ORAL 4 TIMES DAILY PRN
Qty: 200 ML | Refills: 0 | Status: SHIPPED | OUTPATIENT
Start: 2024-03-26 | End: 2024-04-05

## 2024-03-26 RX ORDER — LANOLIN ALCOHOL/MO/W.PET/CERES
1000 CREAM (GRAM) TOPICAL DAILY
COMMUNITY

## 2024-03-26 ASSESSMENT — PATIENT HEALTH QUESTIONNAIRE - PHQ9
10. IF YOU CHECKED OFF ANY PROBLEMS, HOW DIFFICULT HAVE THESE PROBLEMS MADE IT FOR YOU TO DO YOUR WORK, TAKE CARE OF THINGS AT HOME, OR GET ALONG WITH OTHER PEOPLE: SOMEWHAT DIFFICULT
3. TROUBLE FALLING OR STAYING ASLEEP: SEVERAL DAYS
2. FEELING DOWN, DEPRESSED OR HOPELESS: SEVERAL DAYS
5. POOR APPETITE OR OVEREATING: NOT AT ALL
9. THOUGHTS THAT YOU WOULD BE BETTER OFF DEAD, OR OF HURTING YOURSELF: NOT AT ALL
3. TROUBLE FALLING OR STAYING ASLEEP: SEVERAL DAYS
4. FEELING TIRED OR HAVING LITTLE ENERGY: SEVERAL DAYS
9. THOUGHTS THAT YOU WOULD BE BETTER OFF DEAD, OR OF HURTING YOURSELF: NOT AT ALL
8. MOVING OR SPEAKING SO SLOWLY THAT OTHER PEOPLE COULD HAVE NOTICED. OR THE OPPOSITE - BEING SO FIDGETY OR RESTLESS THAT YOU HAVE BEEN MOVING AROUND A LOT MORE THAN USUAL: NOT AT ALL
1. LITTLE INTEREST OR PLEASURE IN DOING THINGS: SEVERAL DAYS
1. LITTLE INTEREST OR PLEASURE IN DOING THINGS: SEVERAL DAYS
SUM OF ALL RESPONSES TO PHQ QUESTIONS 1-9: 4
4. FEELING TIRED OR HAVING LITTLE ENERGY: SEVERAL DAYS
8. MOVING OR SPEAKING SO SLOWLY THAT OTHER PEOPLE COULD HAVE NOTICED. OR THE OPPOSITE, BEING SO FIGETY OR RESTLESS THAT YOU HAVE BEEN MOVING AROUND A LOT MORE THAN USUAL: NOT AT ALL
SUM OF ALL RESPONSES TO PHQ QUESTIONS 1-9: 4
6. FEELING BAD ABOUT YOURSELF - OR THAT YOU ARE A FAILURE OR HAVE LET YOURSELF OR YOUR FAMILY DOWN: NOT AT ALL
SUM OF ALL RESPONSES TO PHQ QUESTIONS 1-9: 4
10. IF YOU CHECKED OFF ANY PROBLEMS, HOW DIFFICULT HAVE THESE PROBLEMS MADE IT FOR YOU TO DO YOUR WORK, TAKE CARE OF THINGS AT HOME, OR GET ALONG WITH OTHER PEOPLE: SOMEWHAT DIFFICULT
SUM OF ALL RESPONSES TO PHQ9 QUESTIONS 1 & 2: 2
SUM OF ALL RESPONSES TO PHQ QUESTIONS 1-9: 4
5. POOR APPETITE OR OVEREATING: NOT AT ALL
SUM OF ALL RESPONSES TO PHQ QUESTIONS 1-9: 4
2. FEELING DOWN, DEPRESSED OR HOPELESS: SEVERAL DAYS
6. FEELING BAD ABOUT YOURSELF - OR THAT YOU ARE A FAILURE OR HAVE LET YOURSELF OR YOUR FAMILY DOWN: NOT AT ALL
7. TROUBLE CONCENTRATING ON THINGS, SUCH AS READING THE NEWSPAPER OR WATCHING TELEVISION: NOT AT ALL
7. TROUBLE CONCENTRATING ON THINGS, SUCH AS READING THE NEWSPAPER OR WATCHING TELEVISION: NOT AT ALL

## 2024-03-26 ASSESSMENT — ENCOUNTER SYMPTOMS
NAUSEA: 0
CONSTIPATION: 0
ABDOMINAL PAIN: 0
CHEST TIGHTNESS: 0
RESPIRATORY NEGATIVE: 1
SHORTNESS OF BREATH: 0
EYES NEGATIVE: 1
ALLERGIC/IMMUNOLOGIC NEGATIVE: 1
RHINORRHEA: 1
DIARRHEA: 0
VOMITING: 0

## 2024-03-26 NOTE — ASSESSMENT & PLAN NOTE
Anxiety/Depression  Chronic. Stable/Well-controlled  Labs: at least annually. Recent labs reviewed.  Non-laboratory testing: None indicated today.  Medication:  No refills needed  Medication and dosage is unchanged today.  Discussed warning S&S r/t medication usage. Discussed SE, AR, AE.  Tolerating medication well. No SE, AR, AE. Benefits outweigh risks. Prefers to continue medication as currently prescribed.  Encourage appropriate rest and fluid intake  Mental Health: Promote sleep hygiene (Establish a regular sleep schedule; Cut down time in bed; Make the bedroom comfortable; Relax at bedtime; Perform measures to make you tired at bedtime). Do not exercise within 90 minutes of bedtime. No overstimulating activities just before bed. Avoid caffeine after lunchtime. Do not eat a heavy meal within 2 hours of bedimte. Avoid excessive fluids immediately before bedtime. Do not use alcohol to induce sleep. Do not turn on light when getting up to use the bathroom.  Encourage lifestyle changes, including healthy eating, exercise, limiting alcohol/tobacco use, and stress reducers.  Monitor for SI or HI. Seek emergent care if symptoms develop.  Discussed alternative therapies such as keeping a diary, CBT, psychotherapy, etc.  F/u in 6 months for re-evaluation, unless an earlier f/u is required.

## 2024-03-26 NOTE — ASSESSMENT & PLAN NOTE
CS Use  Chronic.  Stable..  CSA: Completed today.  UDS: Completed today.  Medication: No refills needed.  Discussed warning S&S r/t medication usage. Discussed SE, AR, AE.  F/u in  at least 2 weeks prior to refill  for re-evaluation, unless an earlier f/u is required. If improvement is noted, we will provide additional refills.    Per Dr Bonner, referral to specialist needed for continued therapy  Referral:  Not performed. .

## 2024-03-26 NOTE — ASSESSMENT & PLAN NOTE
Stable.  Chronic.  Given prescription for Bromfed.     Allergic Rhinitis  Chronic.  Stable..  Labs: At least annually. Recent labs reviewed.  Diagnostic Testing: None ordered today.  Medication: Given Rx for ___.  Discussed warning S&S r/t medication usage. Discussed SE, AR, AE.  Encourage appropriate rest and fluid intake  Encourage lifestyle changes, including healthy eating, exercise, limiting alcohol/tobacco use, and stress reducers.  F/u in 6 months for re-evaluation, unless an earlier f/u is required. If improvement is noted, we will provide additional refills.    General Measures/Prevention:  Symptomatic control by environmental avoidance is the “first-line treatment.”  No evidence to support use of acaricides with mite-proof mattress and pillow covers, carpet and drape removal, removal of plants in the home, and pet control  Air conditioning and limited outside exposure during allergy season  HEPA air  and vacuum bags of unclear efficacy  Close doors and windows during allergy season.  Use a dehumidifier to reduce indoor humidity.  Limit exposure to offending allergen.     Additional Therapies  Nasal saline use has evidence of efficacy as sole agent or as adjunctive treatment (1).  Other treatment strategies  When initiating intranasal steroids, consider starting with a “burst,” using 2 sprays in each nostril daily for 2 weeks and then decreasing to 1 spray in each nostril daily thereafter.  To mitigate long-term side effects of intranasal steroids, consider a “5 days on, 2 days off” strategy, with the days off being the days of lowest exposure to allergens.  Allergen immunotherapy (desensitization)  Reserved when symptoms are uncontrollable with medical therapy or have a comorbidity (e.g., asthma)  Specific allergen extract is injected SC in increasing doses to induce patient tolerance.    COMPLEMENTARY & ALTERNATIVE MEDICINE  Acupuncture may be as effective as oral antihistamine treatment

## 2024-03-26 NOTE — ASSESSMENT & PLAN NOTE
Increased Trulicity from 0.75 mg to 1.5 mg  A1C improving to 5.7, compared to 6.2  25 lb weight loss reported since 09/2023    DM Type II  Chronic. Stable/Well-Controlled  Labs: A1C (as indicated) with annual labs. Recent labs reviewed.  Medication:  Given Rx for Trulicity  Medication dosage is increased today.  Discussed warning S&S r/t medication usage. Discussed SE, AR, AE.  Tolerating medication well. No SE, AR, AE. Benefits outweigh risks. Prefers to continue medication as currently prescribed  Encourage appropriate rest and fluid intake  Encourage lifestyle changes, including healthy eating, exercise, limiting alcohol/tobacco use, and stress reducers.  Monitor BP and HR at home. Keep a log and bring to each visit.  Monitor BGL readings at home as directed (4x daily). Keep a log and bring to each visit.  F/u in 6 months for re-evaluation, unless an earlier f/u is required    General Measures:  Recommended diabetic foot exam at every visit.  Recommended annual diabetic eye exam  Monitor for control of cardiovascular risk factors including BP and lipids  Provided diabetes self-management education    Monofilament:Declined today    No components found for: \"HGBA1C\"   Lab Results   Component Value Date    MICROALBUR 34.8 04/20/2021    LDLCALC 105.2 (H) 03/11/2024    CREATININE 1.10 (H) 03/11/2024       Diabetes (Patient Education)    Weight loss through dietary modification can improve many aspects of type 2 diabetes including glycemic control and hypertension. The improvement in glycemic control is related to both the degree caloric restriction and weight reduction. 150 minutes of intense aerobic exercise per week is shown to assist in diabetes management. Management of diabetes is good for long term health. Diabetes if shown to set one up for cardiac disease, high blood pressure, high cholesterol, and erectile dysfunction in men. Low fat diet is shown to assist in control. Patient advised to bring glucose log to

## 2024-03-26 NOTE — PROGRESS NOTES
[facility] PHYSICIAN SERVICES  [facility] PRIMARY CARE  [address] [place] [address] [address] NICK MATTHEW [id number]  Dept: [phone]       Patient: Paige Sherman  YOB: 1960  Patient Age: 63 y.o.  Patient Sex: female  Medical Record: [id number]  Visit Date: 03/26/2024    Family Practice Clinic Note    Chief Complaint   Patient presents with    Medication Refill       1. Diabetes/weight- follow up. Refills of trulicity. She has been dieting as well. A1c has improved    Colonoscopy, DEXA and mammogram ordered    Diabetes  She presents for her follow-up diabetic visit. She has type 2 diabetes mellitus. Her disease course has been fluctuating. There are no hypoglycemic associated symptoms. Pertinent negatives for hypoglycemia include no dizziness or headaches. There are no diabetic associated symptoms. Pertinent negatives for diabetes include no chest pain and no fatigue. There are no hypoglycemic complications. Symptoms are stable. Diabetic complications include nephropathy. Risk factors for coronary artery disease include diabetes mellitus, dyslipidemia, hypertension, sedentary lifestyle, stress and post-menopausal. Current diabetic treatments: Trulicity, Actos, glimepiride. She is compliant with treatment all of the time.   Weight Management  The problem occurs daily. The problem has been waxing and waning. Pertinent negatives include no abdominal pain, chest pain, fatigue, fever, headaches, nausea, numbness or vomiting. Treatments tried: Trulicity, diet changes. The treatment provided significant relief.        Allergies   Allergen Reactions    Codeine Other (See Comments)     Pain in abdomen in empty stomach  Other reaction(s): GI upset    Sulfa Antibiotics Nausea Only    Levofloxacin Nausea And Vomiting     Sensitivity to medication, nausea and vomiting.       Current Outpatient Medications   Medication Sig Dispense Refill    vitamin B-12 (CYANOCOBALAMIN) 1000 MCG tablet Take 1 tablet by mouth daily

## 2024-03-27 ENCOUNTER — TELEPHONE (OUTPATIENT)
Dept: GASTROENTEROLOGY | Age: 64
End: 2024-03-27

## 2024-03-27 DIAGNOSIS — N18.31 TYPE 2 DIABETES MELLITUS WITH STAGE 3A CHRONIC KIDNEY DISEASE, WITHOUT LONG-TERM CURRENT USE OF INSULIN (HCC): ICD-10-CM

## 2024-03-27 DIAGNOSIS — E11.22 TYPE 2 DIABETES MELLITUS WITH STAGE 3A CHRONIC KIDNEY DISEASE, WITHOUT LONG-TERM CURRENT USE OF INSULIN (HCC): ICD-10-CM

## 2024-03-27 DIAGNOSIS — Z12.11 SCREENING FOR COLON CANCER: Primary | ICD-10-CM

## 2024-03-27 RX ORDER — DULAGLUTIDE 0.75 MG/.5ML
0.75 INJECTION, SOLUTION SUBCUTANEOUS WEEKLY
Qty: 2 ML | Refills: 5 | Status: SHIPPED | OUTPATIENT
Start: 2024-03-27 | End: 2024-04-26

## 2024-03-27 RX ORDER — DULAGLUTIDE 0.75 MG/.5ML
INJECTION, SOLUTION SUBCUTANEOUS
Qty: 2 ML | OUTPATIENT
Start: 2024-03-27

## 2024-03-28 LAB
AMPHETAMINES UR QL SCN: NEGATIVE NG/ML
BARBITURATES UR QL SCN: NEGATIVE NG/ML
BENZODIAZ UR QL SCN: NEGATIVE NG/ML
BZE UR QL SCN: NEGATIVE NG/ML
CANNABINOIDS UR QL SCN: NEGATIVE NG/ML
CREAT UR-MCNC: 216 MG/DL (ref 20–300)
FENTANYL+NORFENTANYL UR QL SCN: NEGATIVE PG/ML
LABORATORY COMMENT REPORT: NORMAL
MEPERIDINE UR QL: NEGATIVE NG/ML
METHADONE UR QL SCN: NEGATIVE NG/ML
OXYCODONE+OXYMORPHONE UR QL SCN: NORMAL NG/ML
PCP UR QL: NEGATIVE NG/ML
PH UR: 5.4 (ref 4.5–8.9)
PROPOXYPH UR QL SCN: NEGATIVE NG/ML
SP GR UR: 1.01
TRAMADOL UR QL SCN: NEGATIVE NG/ML

## 2024-04-03 LAB
Lab: NORMAL
Lab: NORMAL
REFERENCE LAB: NORMAL

## 2024-04-06 ENCOUNTER — HOSPITAL ENCOUNTER (EMERGENCY)
Age: 64
Discharge: HOME | End: 2024-04-06
Payer: MEDICARE

## 2024-04-06 ENCOUNTER — HOSPITAL ENCOUNTER (EMERGENCY)
Dept: GENERAL RADIOLOGY | Age: 64
End: 2024-04-06
Payer: MEDICARE

## 2024-04-06 VITALS
WEIGHT: 293 LBS | BODY MASS INDEX: 53.92 KG/M2 | TEMPERATURE: 97.9 F | HEIGHT: 62 IN | OXYGEN SATURATION: 92 % | DIASTOLIC BLOOD PRESSURE: 51 MMHG | SYSTOLIC BLOOD PRESSURE: 125 MMHG | RESPIRATION RATE: 16 BRPM | HEART RATE: 64 BPM

## 2024-04-06 DIAGNOSIS — S62.91XA CLOSED FRACTURE OF RIGHT HAND, INITIAL ENCOUNTER: Primary | ICD-10-CM

## 2024-04-06 DIAGNOSIS — S92.401A CLOSED FRACTURE OF PHALANX OF RIGHT GREAT TOE, PHYSEAL INVOLVEMENT UNSPECIFIED, UNSPECIFIED PHALANX, INITIAL ENCOUNTER: ICD-10-CM

## 2024-04-06 PROCEDURE — 99283 EMERGENCY DEPT VISIT LOW MDM: CPT

## 2024-04-06 PROCEDURE — 73130 X-RAY EXAM OF HAND: CPT

## 2024-04-06 PROCEDURE — 29125 APPL SHORT ARM SPLINT STATIC: CPT

## 2024-04-06 PROCEDURE — 73630 X-RAY EXAM OF FOOT: CPT

## 2024-04-06 ASSESSMENT — ENCOUNTER SYMPTOMS
ABDOMINAL PAIN: 0
TROUBLE SWALLOWING: 0
SHORTNESS OF BREATH: 0
VOMITING: 0
PHOTOPHOBIA: 0

## 2024-04-06 ASSESSMENT — PAIN - FUNCTIONAL ASSESSMENT: PAIN_FUNCTIONAL_ASSESSMENT: 0-10

## 2024-04-06 ASSESSMENT — PAIN SCALES - GENERAL: PAINLEVEL_OUTOF10: 3

## 2024-04-06 ASSESSMENT — LIFESTYLE VARIABLES
HOW MANY STANDARD DRINKS CONTAINING ALCOHOL DO YOU HAVE ON A TYPICAL DAY: PATIENT DOES NOT DRINK
HOW OFTEN DO YOU HAVE A DRINK CONTAINING ALCOHOL: NEVER

## 2024-04-06 NOTE — ED TRIAGE NOTES
Pt c/o hand pain and rt foot pain after fall. Pt fell after getting up too quickly and became dizzy. Pt denies nausea, vomiting, diarrhea. Pt able to move fingers on rt hand and move rt foot. Did not hit head, no LOC, not on thinners. Pt also c/o cough/congestion. Pt took Oxycodone at home at 1500 today.

## 2024-04-06 NOTE — DISCHARGE INSTRUCTIONS
You have broken your hands as well as your toe.  Leave the splints in place until you see orthopedics.  A referral has been placed.  They should call you on Monday to set your appointment set.  Return for new or worsening symptoms.    As we discussed, I did not find a life threatening cause of your symptoms today. However, THAT DOES NOT MEAN IT COULD NOT DEVELOP. If you develop ANY new or worsening symptoms, it is critical that you return for re-evaluation. This includes any symptoms that are concerning to you, especially symptoms such as numbness, weakness, severe pain, fevers.  If you do not return for re-evaluation, you risk serious complications, including death.

## 2024-04-06 NOTE — ED PROVIDER NOTES
Emergency Department Provider Note       PCP: Adair Bonner Jr., MD   Age: 63 y.o.   Sex: female     DISPOSITION Discharge - Pending Orders Complete 04/06/2024 05:41:06 PM       ICD-10-CM    1. Closed fracture of right hand, initial encounter  S62.91XA [facility]      2. Closed fracture of phalanx of right great toe, physeal involvement unspecified, unspecified phalanx, initial encounter  S92.401A [facility]          Medical Decision Making     In summary this is a 63-year-old female who presented after a fall with concerns for toe and hand pain.  She is neurovascularly intact without signs of compartment syndrome.  X-rays did show fractures of both the hands and the toe today.  Ulnar gutter splint was applied to the wrist.  Steven taping and walking shoe were given for toe fracture.  Urgent referral to orthopedics placed.  Patient is already on chronic narcotics so no additional pain medication was added.  Counseled on signs to return here for.  The patient has verbalized understanding and agreed with the plan.  Discharged in stable condition.   1 acute complicated illness or injury.  Patient was discharged risks and benefits of hospitalization were considered.  Chronic medical problems impacting care include obesity.  Shared medical decision making was utilized in creating the patients health plan today.  ED attending physician present in department at time of care. Based on current hospital policy, their co-signature is not required on this note.   I independently ordered and reviewed each unique test.       I interpreted the X-rays fractures.  RADIOLOGY DISCLAIMER: Although I do my best to interpret the imaging, I am not a board-certified radiologist.  I am still basing my medical decision making off of the board-certified radiology interpretation provided to me.              History     63-year-old female patient with history of neuropathy,  None    Years of education: None    Highest education level: None   Tobacco Use    Smoking status: Never    Smokeless tobacco: Never   Substance and Sexual Activity    Alcohol use: No    Drug use: No    Sexual activity: Not Currently     Partners: Male     Social Determinants of Health     Financial Resource Strain: Low Risk  (3/13/2023)    Overall Financial Resource Strain (CARDIA)     Difficulty of Paying Living Expenses: Not hard at all   Transportation Needs: Unknown (3/13/2023)    PRAPARE - Transportation     Lack of Transportation (Non-Medical): No   Physical Activity: Inactive (9/14/2023)    Exercise Vital Sign     Days of Exercise per Week: 0 days     Minutes of Exercise per Session: 0 min        Previous Medications    ALBUTEROL SULFATE HFA (PROVENTIL;VENTOLIN;PROAIR) 108 (90 BASE) MCG/ACT INHALER    Inhale 2 puffs into the lungs every 4 hours as needed for Wheezing TAKE 1 PUFF BY INHALATION EVERY FOUR (4) HOURS AS NEEDED FOR WHEEZING    AMLODIPINE (NORVASC) 10 MG TABLET    TAKE 1 TABLET BY MOUTH EVERY DAY    BUDESONIDE-FORMOTEROL (SYMBICORT) 160-4.5 MCG/ACT AERO    Inhale 2 puffs into the lungs 2 times daily    CALCIUM CARBONATE-VITAMIN D (CALCIUM-VITAMIN D) 600-125 MG-UNIT TABS    Take by mouth    CELECOXIB (CELEBREX) 200 MG CAPSULE    Take 1 capsule by mouth in the morning.    CLONAZEPAM (KLONOPIN) 1 MG TABLET    Take 1 tablet by mouth 2 times daily for 90 days. Max Daily Amount: 2 mg    DICLOFENAC SODIUM (VOLTAREN) 1 % GEL    Apply 4 g topically 4 times daily    DULAGLUTIDE (TRULICITY) 0.75 MG/0.5ML SOPN    Inject 0.75 mg into the skin once a week If 1.5 mg is not available, per other Rx sent    DULAGLUTIDE (TRULICITY) 1.5 MG/0.5ML SC INJECTION    Inject 0.5 mLs into the skin once a week    DULOXETINE (CYMBALTA) 60 MG EXTENDED RELEASE CAPSULE    Take 1 capsule by mouth 2 times daily    FAMOTIDINE (PEPCID) 20 MG TABLET    Take 1 tablet by mouth 2 times daily TAKE 1 TABLET BY MOUTH TWICE A DAY

## 2024-04-06 NOTE — ED NOTES
I have reviewed discharge instructions with the patient.  The patient verbalized understanding.    Patient left ED via Discharge Method: wheelchair to Home with friend.    Opportunity for questions and clarification provided.       Patient given 0 scripts.         To continue your aftercare when you leave the hospital, you may receive an automated call from our care team to check in on how you are doing.  This is a free service and part of our promise to provide the best care and service to meet your aftercare needs.” If you have questions, or wish to unsubscribe from this service please call [phone].  Thank you for Choosing our [facility] Emergency Department.        Whit Fonseca LPN  04/06/24 [id number]

## 2024-04-08 ENCOUNTER — TELEPHONE (OUTPATIENT)
Dept: ORTHOPEDIC SURGERY | Age: 64
End: 2024-04-08

## 2024-04-08 NOTE — TELEPHONE ENCOUNTER
Closed fracture of right hand, initial encounter   ER  encounter       Pt  is in a  splint    who  can  see and  when?

## 2024-04-09 ENCOUNTER — OFFICE VISIT (OUTPATIENT)
Dept: ORTHOPEDIC SURGERY | Age: 64
End: 2024-04-09
Payer: MEDICARE

## 2024-04-09 DIAGNOSIS — S62.356A CLOSED NONDISPLACED FRACTURE OF SHAFT OF FIFTH METACARPAL BONE OF RIGHT HAND, INITIAL ENCOUNTER: Primary | ICD-10-CM

## 2024-04-09 PROCEDURE — L3809 WHFO W/O JOINTS PRE OTS: HCPCS | Performed by: NURSE PRACTITIONER

## 2024-04-09 PROCEDURE — G8417 CALC BMI ABV UP PARAM F/U: HCPCS | Performed by: NURSE PRACTITIONER

## 2024-04-09 PROCEDURE — 1123F ACP DISCUSS/DSCN MKR DOCD: CPT | Performed by: NURSE PRACTITIONER

## 2024-04-09 PROCEDURE — 3017F COLORECTAL CA SCREEN DOC REV: CPT | Performed by: NURSE PRACTITIONER

## 2024-04-09 PROCEDURE — G8399 PT W/DXA RESULTS DOCUMENT: HCPCS | Performed by: NURSE PRACTITIONER

## 2024-04-09 PROCEDURE — 99214 OFFICE O/P EST MOD 30 MIN: CPT | Performed by: NURSE PRACTITIONER

## 2024-04-09 PROCEDURE — 1090F PRES/ABSN URINE INCON ASSESS: CPT | Performed by: NURSE PRACTITIONER

## 2024-04-09 PROCEDURE — G8427 DOCREV CUR MEDS BY ELIG CLIN: HCPCS | Performed by: NURSE PRACTITIONER

## 2024-04-09 PROCEDURE — 1036F TOBACCO NON-USER: CPT | Performed by: NURSE PRACTITIONER

## 2024-04-09 NOTE — PROGRESS NOTES
Orthopaedic Hand Clinic Note    Name: Paige Sherman  YOB: 1960  Gender: female  MRN: [id number]      CC: Patient referred for evaluation of right hand    HPI: Paige Sherman is a 63 y.o. female right hand dominant with a chief complaint of right hand injury.  She reports on Saturday, April 6, 2024 she was sitting on the bed and started to fall.  She said her hand got stuck between the bed and bed frame and bent backwards.  She does ambulate with a cane with the right upper extremity.  She is in pain management and takes Percocet 10 mg every 6 hours.  She also has a toe fracture and has follow-up on Monday.    ROS/Meds/PSH/PMH/FH/SH: I personally reviewed the patients standard intake form.  Pertinents are discussed in the HPI    Physical Examination:  General: Awake and alert.  HEENT: Normocephalic, atraumatic  CV/Pulm: Breathing even and unlabored  Skin: No obvious rashes noted.  Lymphatic: No obvious evidence of lymphedema or lymphadenopathy    Musculoskeletal Exam:  Examination on the right upper extremity demonstrates cap refill < 5 seconds in all fingers  Patient has swelling and ecchymosis to the right hand.  She is tender to palpation over the fifth metacarpal.  She is able to make a full composite fist today.  She denies paresthesia.  She has good capillary refill.    Imaging / Electrodiagnostic Tests:     X-rays include a 3 view right hand from the emergency room are independently reviewed and interpreted.  Patient has a spiral fracture of the fifth metacarpal shaft of the right hand.    Assessment:   1. Closed nondisplaced fracture of shaft of fifth metacarpal bone of right hand, initial encounter        Plan:   We discussed the diagnosis and different treatment options. We discussed observation, therapy, antiinflammatory medications and other pertinent treatment modalities.    After discussing in detail the patient elects to proceed with boxers brace.  She can remove this for

## 2024-04-09 NOTE — PROGRESS NOTES
Patient was prescribed a Boxer Splint for the right hand. The top portion of the boxer splint is bent slightly to an angle that allows fingers to be in a relaxed position. The straps are opened to allow the hand to rest in the splint with the fourth and fifth fingers in the top portion of the splint. The top portion strap is secured around the fourth and fifth fingers. The strap around the wrist is then secured followed by the middle strap that is secured between the first finger and thumbPatient read and signed documenting they understand and agree to [facility]'s current DME return policy.

## 2024-04-11 LAB
AMPHETAMINES UR QL SCN: NEGATIVE NG/ML
BARBITURATES UR QL SCN: NEGATIVE NG/ML
BENZODIAZ UR QL SCN: NEGATIVE NG/ML
BZE UR QL SCN: NEGATIVE NG/ML
CANNABINOIDS UR QL SCN: NEGATIVE NG/ML
CODEINE URINE: NEGATIVE
CREAT UR-MCNC: 216 MG/DL (ref 20–300)
FENTANYL+NORFENTANYL UR QL SCN: NEGATIVE PG/ML
HYDROCODONE, URINE: NEGATIVE
HYDROMORPHONE, URINE: NEGATIVE
LABORATORY COMMENT REPORT: NORMAL
MEPERIDINE UR QL: NEGATIVE NG/ML
METHADONE UR QL SCN: NEGATIVE NG/ML
MORPHINE, OPI1M: POSITIVE
MORPHINE, URINE CONFIRMATION: >3000 NG/ML
OPIATES UR QL SCN: NORMAL NG/ML
OPIATES, URINE: POSITIVE NG/ML
OXYCODONE URINE: POSITIVE
OXYCODONE+OXYMORPHONE UR QL SCN: NORMAL NG/ML
OXYCODONE, URINE CONFIRMATION: >3000 NG/ML
OXYCODONE/OXYMORPHONE, URINE: POSITIVE
OXYMORPHONE, URINE CONFIRMATION: 992 NG/ML
OXYMORPHONE, URINE: POSITIVE
PCP UR QL: NEGATIVE NG/ML
PH UR: 5.4 (ref 4.5–8.9)
PROPOXYPH UR QL SCN: NEGATIVE NG/ML
SP GR UR: 1.01
TRAMADOL UR QL SCN: NEGATIVE NG/ML

## 2024-04-12 DIAGNOSIS — E03.9 ACQUIRED HYPOTHYROIDISM: ICD-10-CM

## 2024-04-12 RX ORDER — LEVOTHYROXINE SODIUM 0.07 MG/1
75 TABLET ORAL
Qty: 90 TABLET | Refills: 1 | Status: SHIPPED | OUTPATIENT
Start: 2024-04-12

## 2024-04-13 DIAGNOSIS — F33.0 DEPRESSION, MAJOR, RECURRENT, MILD (HCC): ICD-10-CM

## 2024-04-15 RX ORDER — DULOXETIN HYDROCHLORIDE 60 MG/1
60 CAPSULE, DELAYED RELEASE ORAL 2 TIMES DAILY
Qty: 180 CAPSULE | Refills: 0 | OUTPATIENT
Start: 2024-04-15

## 2024-04-17 ENCOUNTER — OFFICE VISIT (OUTPATIENT)
Dept: ORTHOPEDIC SURGERY | Age: 64
End: 2024-04-17
Payer: MEDICARE

## 2024-04-17 DIAGNOSIS — R52 PAIN: Primary | ICD-10-CM

## 2024-04-17 DIAGNOSIS — S92.421A DISPLACED FRACTURE OF DISTAL PHALANX OF RIGHT GREAT TOE, INITIAL ENCOUNTER FOR CLOSED FRACTURE: ICD-10-CM

## 2024-04-17 PROCEDURE — G8428 CUR MEDS NOT DOCUMENT: HCPCS | Performed by: PHYSICIAN ASSISTANT

## 2024-04-17 PROCEDURE — 1036F TOBACCO NON-USER: CPT | Performed by: PHYSICIAN ASSISTANT

## 2024-04-17 PROCEDURE — 1123F ACP DISCUSS/DSCN MKR DOCD: CPT | Performed by: PHYSICIAN ASSISTANT

## 2024-04-17 PROCEDURE — 1090F PRES/ABSN URINE INCON ASSESS: CPT | Performed by: PHYSICIAN ASSISTANT

## 2024-04-17 PROCEDURE — G8417 CALC BMI ABV UP PARAM F/U: HCPCS | Performed by: PHYSICIAN ASSISTANT

## 2024-04-17 PROCEDURE — 99214 OFFICE O/P EST MOD 30 MIN: CPT | Performed by: PHYSICIAN ASSISTANT

## 2024-04-17 PROCEDURE — G8399 PT W/DXA RESULTS DOCUMENT: HCPCS | Performed by: PHYSICIAN ASSISTANT

## 2024-04-17 PROCEDURE — 3017F COLORECTAL CA SCREEN DOC REV: CPT | Performed by: PHYSICIAN ASSISTANT

## 2024-04-17 PROCEDURE — 28490 TREAT BIG TOE FRACTURE: CPT | Performed by: PHYSICIAN ASSISTANT

## 2024-04-17 NOTE — PROGRESS NOTES
Name: Paige Sherman  YOB: 1960  Gender: female  MRN: [id number]    Treatment Plan:   I had a thorough discussion with the patient regarding the treatment plan   Right great toe fracture    Patient has postop sandal already  Steven tape applied today    Patient understands and in full agreement with the treatment plan.    Weight-bearing status: WBAT in Boot/hardsole shoe        Return to work/work restrictions: none  No medications given    F/u 4-6 weeks w/ Foot Xray-transition to carbon fiber insert at this time     CC: great  toe pain    HPI: Paige Sherman is a 63 y.o. female who has toe pain. They  state on 4/ 6/24 they had a fall from standing.  Patient states that she became dizzy getting up from the bed which caused her to fall forward.  Her right-footed was trapped underneath her.  She also broke her right-hand.  She presented to the ED where they placed her in a postop sandal.  She has been ambulating some with a sandal.  Patient is on oxycodone regularly for back pain. .      History was obtained by patient    ROS:  Patient Denies fever/chills, headache, visual changes, chest pain, shortness of breath, and nausea/vomiting/diarrhea     Tobacco:  reports that she has never smoked. She has never used smokeless tobacco.  Diabetes:   Lab Results   Component Value Date    LABA1C 5.7 (H) 03/11/2024    LABA1C 6.2 (H) 09/07/2023    LABA1C 6.0 (H) 07/14/2021       Past Medical History:   Diagnosis Date    Acquired hypothyroidism 12/27/2017    Anxiety     Arthritis     Asthma Ll    Chronic back pain     Depression      & ANXIETY    Fibromyalgia     Headache     Hypertension 1977    Hyperthyroidism     Neuropathy     Obesity, morbid (HCC)     Osteoarthritis     Psychiatric disorder 2004    depression    Psychiatric disorder 2004    Anxiety disorder    Restless legs syndrome     Sleep apnea     uses c-pap, non compliant    Type 2 diabetes mellitus without complication, without long-term

## 2024-04-24 DIAGNOSIS — F13.20 SEDATIVE, HYPNOTIC OR ANXIOLYTIC DEPENDENCE, UNCOMPLICATED (HCC): ICD-10-CM

## 2024-04-24 DIAGNOSIS — F41.9 ANXIETY: ICD-10-CM

## 2024-04-24 RX ORDER — CLONAZEPAM 1 MG/1
1 TABLET ORAL 2 TIMES DAILY
Qty: 60 TABLET | Refills: 2 | OUTPATIENT
Start: 2024-04-24 | End: 2024-07-23

## 2024-04-30 ENCOUNTER — OFFICE VISIT (OUTPATIENT)
Age: 64
End: 2024-04-30
Payer: MEDICARE

## 2024-04-30 DIAGNOSIS — F13.20 SEDATIVE, HYPNOTIC OR ANXIOLYTIC DEPENDENCE, UNCOMPLICATED (HCC): ICD-10-CM

## 2024-04-30 DIAGNOSIS — S62.356A CLOSED NONDISPLACED FRACTURE OF SHAFT OF FIFTH METACARPAL BONE OF RIGHT HAND, INITIAL ENCOUNTER: Primary | ICD-10-CM

## 2024-04-30 DIAGNOSIS — F41.9 ANXIETY: ICD-10-CM

## 2024-04-30 PROCEDURE — G8427 DOCREV CUR MEDS BY ELIG CLIN: HCPCS | Performed by: NURSE PRACTITIONER

## 2024-04-30 PROCEDURE — 99213 OFFICE O/P EST LOW 20 MIN: CPT | Performed by: NURSE PRACTITIONER

## 2024-04-30 PROCEDURE — G8417 CALC BMI ABV UP PARAM F/U: HCPCS | Performed by: NURSE PRACTITIONER

## 2024-04-30 PROCEDURE — 1123F ACP DISCUSS/DSCN MKR DOCD: CPT | Performed by: NURSE PRACTITIONER

## 2024-04-30 PROCEDURE — 1036F TOBACCO NON-USER: CPT | Performed by: NURSE PRACTITIONER

## 2024-04-30 PROCEDURE — 3017F COLORECTAL CA SCREEN DOC REV: CPT | Performed by: NURSE PRACTITIONER

## 2024-04-30 PROCEDURE — 1090F PRES/ABSN URINE INCON ASSESS: CPT | Performed by: NURSE PRACTITIONER

## 2024-04-30 PROCEDURE — G8399 PT W/DXA RESULTS DOCUMENT: HCPCS | Performed by: NURSE PRACTITIONER

## 2024-04-30 NOTE — PROGRESS NOTES
Orthopaedic Hand Clinic Note    Name: Paige Sherman  YOB: 1960  Gender: female  MRN: [id number]      Follow up visit:   1. Closed nondisplaced fracture of shaft of fifth metacarpal bone of right hand, initial encounter        HPI: Paige Sherman is a 63 y.o. female who is following up for right fifth metacarpal fracture.  She is 3 and half weeks from injury.  She has been in her [employer] brace.  She said she is doing better.  She reports compliance with her restrictions.    ROS/Meds/PSH/PMH/FH/SH: I personally reviewed the patients standard intake form.  Pertinents are discussed in the HPI    Physical Examination:    Musculoskeletal Examination:  Examination on the right upper extremity demonstrates cap refill < 5 seconds in all fingers  Patient has mild swelling to the right hand.  She  palpation of the right fifth metacarpal.  She able to make a full composite fist today.  She denies paresthesia.    Imaging / Electrodiagnostic Tests:     X-rays include a 3 view right hand are reviewed.  Fractures maintaining alignment    Assessment:     ICD-10-CM    1. Closed nondisplaced fracture of shaft of fifth metacarpal bone of right hand, initial encounter  S62.356A XR HAND RIGHT (MIN 3 VIEWS)          Plan:   We discussed the diagnosis and different treatment options. We discussed observation, therapy, antiinflammatory medications and other pertinent treatment modalities.    After discussing in detail the patient elects to proceed with continuing with her brace and restrictions.  I will reassess her progress back in 3 weeks with new x-rays..     Patient voiced accordance and understanding of the information provided and the formulated plan. All questions were answered to the patient's satisfaction during the encounter.    3 This is an acute uncomplicated problem/injury  Treatment at this time: Brace and restrictions    PIPPA Moreno - CNP  Orthopaedic Surgery  04/30/24  11:08

## 2024-05-01 ENCOUNTER — TELEMEDICINE (OUTPATIENT)
Dept: PRIMARY CARE CLINIC | Facility: CLINIC | Age: 64
End: 2024-05-01
Payer: MEDICARE

## 2024-05-01 DIAGNOSIS — J06.9 UPPER RESPIRATORY TRACT INFECTION, UNSPECIFIED TYPE: Primary | ICD-10-CM

## 2024-05-01 DIAGNOSIS — F13.20 SEDATIVE, HYPNOTIC OR ANXIOLYTIC DEPENDENCE, UNCOMPLICATED (HCC): ICD-10-CM

## 2024-05-01 DIAGNOSIS — F41.9 ANXIETY: ICD-10-CM

## 2024-05-01 DIAGNOSIS — R05.1 ACUTE COUGH: ICD-10-CM

## 2024-05-01 PROCEDURE — 99213 OFFICE O/P EST LOW 20 MIN: CPT | Performed by: NURSE PRACTITIONER

## 2024-05-01 PROCEDURE — 1123F ACP DISCUSS/DSCN MKR DOCD: CPT | Performed by: NURSE PRACTITIONER

## 2024-05-01 RX ORDER — AZITHROMYCIN 250 MG/1
250 TABLET, FILM COATED ORAL SEE ADMIN INSTRUCTIONS
Qty: 6 TABLET | Refills: 0 | Status: SHIPPED | OUTPATIENT
Start: 2024-05-01 | End: 2024-05-06

## 2024-05-01 RX ORDER — CLONAZEPAM 1 MG/1
1 TABLET ORAL 2 TIMES DAILY
Qty: 60 TABLET | Refills: 2 | Status: SHIPPED | OUTPATIENT
Start: 2024-05-01 | End: 2024-07-30

## 2024-05-01 RX ORDER — BROMPHENIRAMINE MALEATE, PSEUDOEPHEDRINE HYDROCHLORIDE, AND DEXTROMETHORPHAN HYDROBROMIDE 2; 30; 10 MG/5ML; MG/5ML; MG/5ML
2.5 SYRUP ORAL 4 TIMES DAILY PRN
Qty: 200 ML | Refills: 0 | Status: SHIPPED | OUTPATIENT
Start: 2024-05-01 | End: 2024-05-11

## 2024-05-01 ASSESSMENT — ENCOUNTER SYMPTOMS
DIARRHEA: 0
SHORTNESS OF BREATH: 0
SINUS PRESSURE: 1
EYES NEGATIVE: 1
SWOLLEN GLANDS: 0
SORE THROAT: 1
HOARSE VOICE: 0
CONSTIPATION: 0
COUGH: 1
CHEST TIGHTNESS: 0
ALLERGIC/IMMUNOLOGIC NEGATIVE: 1

## 2024-05-01 NOTE — ASSESSMENT & PLAN NOTE
CS Use  Chronic.  Stable..  CSA: UTD. 03/26/2024  UDS: UTD. 03/26/2024 - + for clonazepam. No illegal substances found  Medication: Given refill for Klonopin  Discussed warning S&S r/t medication usage. Discussed SE, AR, AE.  F/u in 3 months for re-evaluation, unless an earlier f/u is required. If improvement is noted, we will provide additional refills.    Per Dr Bonner, referral to specialist needed for continued therapy  Referral: Not performed.

## 2024-05-01 NOTE — PROGRESS NOTES
(PROVENTIL;VENTOLIN;PROAIR) 108 (90 Base) MCG/ACT inhaler Inhale 2 puffs into the lungs every 4 hours as needed for Wheezing TAKE 1 PUFF BY INHALATION EVERY FOUR (4) HOURS AS NEEDED FOR WHEEZING Yes Adair Bonner Jr., MD   mirabegron (MYRBETRIQ) 50 MG TB24 Take 50 mg by mouth daily Yes Adair Bonner Jr., MD   celecoxib (CELEBREX) 200 MG capsule Take 1 capsule by mouth in the morning. Yes Adair Bonner Jr., MD   budesonide-formoterol (SYMBICORT) 160-4.5 MCG/ACT AERO Inhale 2 puffs into the lungs 2 times daily Yes Automatic Reconciliation, Ar   Calcium Carbonate-Vitamin D (CALCIUM-VITAMIN D) 600-125 MG-UNIT TABS Take by mouth Yes Automatic Reconciliation, Ar   diclofenac sodium (VOLTAREN) 1 % GEL Apply 4 g topically 4 times daily Yes Automatic Reconciliation, Ar   gabapentin (NEURONTIN) 600 MG tablet Take 2 tablets by mouth. Yes Automatic Reconciliation, Ar   naloxone 4 MG/0.1ML LIQD nasal spray Use 1 spray intranasally into 1 nostril. Use a new Narcan nasal spray for subsequent doses and administer into alternating nostrils. May repeat every 2 to 3 minutes as needed. Yes Automatic Reconciliation, Ar   rOPINIRole (REQUIP) 2 MG tablet Take 1 tablet by mouth Yes Automatic Reconciliation, Ar   SUMAtriptan (IMITREX) 50 MG tablet TAKE 1TAB BY MOUTH ONCE AS NEEDED FOR MIGRAINE (MAY REPEAT IN 2 HOURS, MAX 2 TAB/24) Yes Automatic Reconciliation, Ar   tolterodine (DETROL LA) 4 MG extended release capsule Take 1 capsule by mouth daily Yes Automatic Reconciliation, Ar   Dulaglutide (TRULICITY) 0.75 MG/0.5ML SOPN Inject 0.75 mg into the skin once a week If 1.5 mg is not available, per other Rx sent  Madiha Hinkle APRN - NP   dulaglutide (TRULICITY) 1.5 MG/0.5ML SC injection Inject 0.5 mLs into the skin once a week  Madiha Hinkle APRN - NP       Social History     Tobacco Use    Smoking status: Never    Smokeless tobacco: Never   Substance Use Topics    Alcohol use: No    Drug use: No        Allergies

## 2024-05-01 NOTE — ASSESSMENT & PLAN NOTE
URI    Medications  Given Rx for Bromfed.  Given Rx for Azithromycin.    Treatment Options:  Encouraged rest and fluids  Patient to f/u prn or if symptoms worsen or persist.  Patient reminded to complete antibiotic course, regardless of symptom relief.  Patient encouraged to use OTC medications such as Tylenol or Motrin, as directed, for pain relief.  Patient encouraged to use salt water gargles to help alleviate sore throat. Many mouthwashes, gargles, and lozenges are promoted to relieve the pain of sore throat. The demulcent effects of hard candy, gargling with warm saline, and products with anesthetics (benzocaine/phenol) may provide pain relief.  Patient encouraged to use vaporizer and cool mist humidifier as well.  Patient instructed to use OTC expectorants (such as Mucinex), cough suppressants, nasal steroids (such as Flonase), saline nasal irrigation, and antihistamine (such as Claritin or Zyrtec) to help alleviate S&S.  Recommended and educated patient on prevention. Patient advised to clean work station and avoid infected individuals. Encouraged frequent hand washing.  Educated patient on use of decongestants for symptom relief; however, risks associated with medication was discussed. Topical decongestants (sympathomimetics) reduce nasal mucosa swelling and airflow resistance, promote drainage; sprays preferred over drops in patients >6 years of age: Limit use to 3 days because rebound congestion may occur after 72 hours of use with resultant rhinitis medicamentosa. Oral decongestants (sympathomimetic) have some advantages over topical decongestants: longer duration of action, lack of local irritation, and no risk of rebound congestion. Use with caution for patients with cardiovascular disease, HTN, and BPH.

## 2024-05-01 NOTE — ASSESSMENT & PLAN NOTE
CS Use  Chronic.  Stable..  CSA: UTD. 03/26/2024  UDS: UTD. 03/26/2024 - + for clonazepam. No illegal substances found  Medication:  Given refill for Klonopin  Discussed warning S&S r/t medication usage. Discussed SE, AR, AE.  F/u in 3 months for re-evaluation, unless an earlier f/u is required. If improvement is noted, we will provide additional refills.    Per Dr Bonner, referral to specialist needed for continued therapy  Referral:  Not performed .    Anxiety/Depression  Chronic. Stable/Well-controlled  Labs: at least annually. No labs collected today.  Non-laboratory testing: None indicated today.  Medication:  Given refill for Klonopin  Medication and dosage is unchanged today.  Discussed warning S&S r/t medication usage. Discussed SE, AR, AE.  Tolerating medication well. No SE, AR, AE. Benefits outweigh risks. Prefers to continue medication as currently prescribed.  Encourage appropriate rest and fluid intake  Mental Health: Promote sleep hygiene (Establish a regular sleep schedule; Cut down time in bed; Make the bedroom comfortable; Relax at bedtime; Perform measures to make you tired at bedtime). Do not exercise within 90 minutes of bedtime. No overstimulating activities just before bed. Avoid caffeine after lunchtime. Do not eat a heavy meal within 2 hours of bedimte. Avoid excessive fluids immediately before bedtime. Do not use alcohol to induce sleep. Do not turn on light when getting up to use the bathroom.  Encourage lifestyle changes, including healthy eating, exercise, limiting alcohol/tobacco use, and stress reducers.  Monitor for SI or HI. Seek emergent care if symptoms develop.  Discussed alternative therapies such as keeping a diary, CBT, psychotherapy, etc.  F/u in 3 months for re-evaluation, unless an earlier f/u is required.

## 2024-05-02 DIAGNOSIS — F33.0 DEPRESSION, MAJOR, RECURRENT, MILD (HCC): ICD-10-CM

## 2024-05-02 RX ORDER — DULOXETIN HYDROCHLORIDE 60 MG/1
60 CAPSULE, DELAYED RELEASE ORAL 2 TIMES DAILY
Qty: 180 CAPSULE | Refills: 1 | Status: SHIPPED | OUTPATIENT
Start: 2024-05-02 | End: 2024-10-29

## 2024-05-07 RX ORDER — CLONAZEPAM 1 MG/1
1 TABLET ORAL 2 TIMES DAILY
Qty: 60 TABLET | Refills: 2 | OUTPATIENT
Start: 2024-05-07 | End: 2024-08-05

## 2024-05-16 ENCOUNTER — CLINICAL DOCUMENTATION (OUTPATIENT)
Dept: ORTHOPEDIC SURGERY | Age: 64
End: 2024-05-16

## 2024-05-16 NOTE — PROGRESS NOTES
LVM to reschedule 5/22 appt as provider is needed in OR. Requested call back. Ok for appts dept to reschedule.

## 2024-05-29 DIAGNOSIS — M79.601 RIGHT ARM PAIN: ICD-10-CM

## 2024-05-29 RX ORDER — TIZANIDINE 4 MG/1
TABLET ORAL
Qty: 90 TABLET | Refills: 2 | OUTPATIENT
Start: 2024-05-29

## 2024-06-14 DIAGNOSIS — I10 ESSENTIAL HYPERTENSION: ICD-10-CM

## 2024-06-17 RX ORDER — AMLODIPINE BESYLATE 10 MG/1
10 TABLET ORAL DAILY
Qty: 90 TABLET | Refills: 0 | OUTPATIENT
Start: 2024-06-17

## 2024-06-28 DIAGNOSIS — I10 ESSENTIAL HYPERTENSION: ICD-10-CM

## 2024-06-28 RX ORDER — LOSARTAN POTASSIUM 100 MG/1
TABLET ORAL
Qty: 90 TABLET | Refills: 1 | OUTPATIENT
Start: 2024-06-28

## 2024-07-12 DIAGNOSIS — E03.9 ACQUIRED HYPOTHYROIDISM: ICD-10-CM

## 2024-07-12 RX ORDER — LEVOTHYROXINE SODIUM 0.07 MG/1
75 TABLET ORAL
Qty: 90 TABLET | Refills: 1 | Status: SHIPPED | OUTPATIENT
Start: 2024-07-12

## 2024-08-21 ENCOUNTER — E-VISIT (OUTPATIENT)
Dept: PRIMARY CARE CLINIC | Facility: CLINIC | Age: 64
End: 2024-08-21
Payer: MEDICARE

## 2024-08-21 DIAGNOSIS — J06.9 UPPER RESPIRATORY TRACT INFECTION, UNSPECIFIED TYPE: ICD-10-CM

## 2024-08-21 DIAGNOSIS — J01.90 ACUTE NON-RECURRENT SINUSITIS, UNSPECIFIED LOCATION: Primary | ICD-10-CM

## 2024-08-21 DIAGNOSIS — R05.1 ACUTE COUGH: ICD-10-CM

## 2024-08-21 PROCEDURE — 99422 OL DIG E/M SVC 11-20 MIN: CPT | Performed by: NURSE PRACTITIONER

## 2024-08-21 RX ORDER — AZITHROMYCIN 250 MG/1
250 TABLET, FILM COATED ORAL SEE ADMIN INSTRUCTIONS
Qty: 6 TABLET | Refills: 0 | Status: SHIPPED | OUTPATIENT
Start: 2024-08-21 | End: 2024-08-26

## 2024-08-21 RX ORDER — PREDNISONE 5 MG/1
1 TABLET ORAL SEE ADMIN INSTRUCTIONS
Qty: 21 EACH | Refills: 0 | Status: SHIPPED | OUTPATIENT
Start: 2024-08-21

## 2024-08-21 RX ORDER — BROMPHENIRAMINE MALEATE, PSEUDOEPHEDRINE HYDROCHLORIDE, AND DEXTROMETHORPHAN HYDROBROMIDE 2; 30; 10 MG/5ML; MG/5ML; MG/5ML
2.5 SYRUP ORAL 4 TIMES DAILY PRN
Qty: 200 ML | Refills: 0 | Status: SHIPPED | OUTPATIENT
Start: 2024-08-21 | End: 2024-09-10

## 2024-08-21 ASSESSMENT — LIFESTYLE VARIABLES: SMOKING_STATUS: NO, I'VE NEVER SMOKED

## 2024-08-21 NOTE — PROGRESS NOTES
Paige Sherman (1960) initiated an asynchronous digital communication through [employer].    HPI: per patient questionnaire     Exam: not applicable    Diagnoses and all orders for this visit:  Diagnoses and all orders for this visit:    Acute cough  -     brompheniramine-pseudoephedrine-DM 2-30-10 MG/5ML syrup; Take 2.5 mLs by mouth 4 times daily as needed for Cough or Congestion  -     predniSONE 5 MG (21) TBPK; Take 1 dose pack by mouth See Admin Instructions    Upper respiratory tract infection, unspecified type  -     azithromycin (ZITHROMAX) 250 MG tablet; Take 1 tablet by mouth See Admin Instructions for 5 days 500mg on day 1 followed by 250mg on days 2 - 5  -     predniSONE 5 MG (21) TBPK; Take 1 dose pack by mouth See Admin Instructions          Time: EV2 - 11-20 minutes were spent on the digital evaluation and management of this patient.     PIPPA Martin - NP       [employer] Sinus/Cough/Cold Questionnaire 1    Question 8/21/2024  2:56 PM EDT - Filed by Patient   Have you been experiencing nasal congestion?   No   When you blow your nose, what color is the mucus?   No mucus comes out when I blow my nose   Have you had pain or pressure around your nose and face or do your upper teeth hurt?   Yes   Has your throat been sore? Yes   Have you noticed any swollen glands in your neck? No   Have you been sneezing? No   Have you been coughing? No   Have you been hoarse or lost your voice? Yes   Have your ears been bothering you? No   If yes, please describe. Hoarse when i talk   Have your eyes been bothering you? No   If yes, please describe.    Have you been experiencing significant body aches? Yes   Have you had severe headaches or stiff neck? No   Have you been experiencing consistent nausea, vomitting, or dizziness? No   Have you been experiencing swelling of your mouth or tongue, or difficulty swallowing? No   Have you been experiencing chest pain or shortness of breath? No   Have you developed a

## 2024-08-30 ENCOUNTER — E-VISIT (OUTPATIENT)
Dept: PRIMARY CARE CLINIC | Facility: CLINIC | Age: 64
End: 2024-08-30
Payer: MEDICARE

## 2024-08-30 DIAGNOSIS — N39.0 URINARY TRACT INFECTION WITHOUT HEMATURIA, SITE UNSPECIFIED: Primary | ICD-10-CM

## 2024-08-30 DIAGNOSIS — R30.0 DYSURIA: ICD-10-CM

## 2024-08-30 PROCEDURE — 99422 OL DIG E/M SVC 11-20 MIN: CPT | Performed by: NURSE PRACTITIONER

## 2024-08-30 RX ORDER — NITROFURANTOIN 25; 75 MG/1; MG/1
100 CAPSULE ORAL 2 TIMES DAILY
Qty: 20 CAPSULE | Refills: 0 | Status: SHIPPED | OUTPATIENT
Start: 2024-08-30 | End: 2024-09-09

## 2024-08-30 NOTE — PROGRESS NOTES
Paige Sherman (1960) initiated an asynchronous digital communication through [employer].    HPI: per patient questionnaire     Exam: not applicable    Diagnoses and all orders for this visit:  There are no diagnoses linked to this encounter.    Azithromycin - last rx on 05/01/2024  Augmentin - last rx on 11/29/2023    Time: EV2 - 11-20 minutes were spent on the digital evaluation and management of this patient.     PIPPA Martin NP        Triggered a BPA  Scoring question               Aless[facility] [employer] Dysuria Questionnaire 1    Question 8/30/2024  8:49 AM EDT - Filed by Patient   Are you experiencing pain while passing urine? No   Do you feel the urge to urinate more frequently than normal? Yes   How often have you been feeling the need to urinate? 5-10 times over the past 24 hours   How much have you been urinating each time? Same as normal   Are you having difficulty passing urine? No   What does your urine look like? Yellow   How would you describe the smell of your urine? Normal   How long have you been experiencing these symptoms? More than 2 days, but less than 1 week   Are you sexually active? No   Are you experiencing vaginal itching? No   Are you experiencing a lot of white, clumpy vaginal discharge? No   Are you experiencing dark yellow or green vaginal discharge, discharge with an unusual smell, genital sores, or bloody discharge after menopause? No   Are you experiencing moderate to severe pain in your back, side or belly? Yes   Where has this pain been occurring- select as many as apply? Right side    Middle lower belly   Please describe the pain you are experiencing- select as many as apply. Crampy    Burning    Intermittent   Have you noticed any blood in your stools? No   Have you been experiencing any fevers since your urinary symptoms started? No   Have you been nauseated? No   Have you been vomitting? No   Have you been experiencing new or worsening diarrhea? No   Have you been  experiencing new or worsening constipation? No   Do you have a history of decreased kidney function, kidney stones, infection of the bladder or kidneys requiring hospitalization, urinary retention or difficulty emptying the bladder, surgery on the bladder or kidneys, or placement of a urinary catheter within the past 3 months? No   Do you have any chronic illnesses, such as diabetes, HIV or cancer, or have you recently taken any medications that can weaken your ability to fight infection, such as prednisone? Yes   Please enter the details of your other illness(es) or medications that can weaken the ability to fight infection. Just finished antibiotic for sinus infection.   Are your symptoms getting better, worse, or staying the same? Gradually worsening   Have you experienced similar symptoms in the past? Yes   How often have you experienced these symptoms in the past? Occasionally   When was the last episode? I jave to keep over the counter pills for bladder spasms.  This time it doesn’t seem to be helping.  That is why i think that I might have a bladder infection.   What treatments have worked in the past? Bacterial infection meds.   What has not worked? Over the counter pills are not helping at this time.   Are you currently using any medications or other treatments for these symptoms? No   Are there any other issues related to your urinary symptoms that you would like to share?   I am drinking plenty of water but urine is still  yellow.  Pain seems to come after i urinate.     Patient-Entered Cms Msp: Part I    Question 8/30/2024  8:33 AM EDT - Filed by Patient   Medicare requires that we periodically ask the following questions.    Are you receiving Black Lung (BL) benefits? No   Are the services to be paid by a government research program? No   Are you entitled to benefits through the Department of Veterans Affairs (DVA)? No   Was the illness/injury due to a work-related accident/condition? No   Was the

## 2024-09-04 ENCOUNTER — NURSE ONLY (OUTPATIENT)
Dept: PRIMARY CARE CLINIC | Facility: CLINIC | Age: 64
End: 2024-09-04

## 2024-09-04 DIAGNOSIS — N39.0 URINARY TRACT INFECTION WITHOUT HEMATURIA, SITE UNSPECIFIED: ICD-10-CM

## 2024-09-04 DIAGNOSIS — R30.0 DYSURIA: ICD-10-CM

## 2024-09-04 DIAGNOSIS — R30.0 DYSURIA: Primary | ICD-10-CM

## 2024-09-04 LAB
BILIRUBIN, URINE, POC: NORMAL
BLOOD URINE, POC: NEGATIVE
GLUCOSE URINE, POC: 100
KETONES, URINE, POC: NORMAL
LEUKOCYTE ESTERASE, URINE, POC: NEGATIVE
NITRITE, URINE, POC: POSITIVE
PH, URINE, POC: 5 (ref 4.6–8)
PROTEIN,URINE, POC: 300
SPECIFIC GRAVITY, URINE, POC: 1.02 (ref 1–1.03)
URINALYSIS CLARITY, POC: CLEAR
URINALYSIS COLOR, POC: YELLOW
UROBILINOGEN, POC: NORMAL

## 2024-09-06 LAB
BACTERIA SPEC CULT: NORMAL
BACTERIA SPEC CULT: NORMAL
SERVICE CMNT-IMP: NORMAL

## 2024-09-09 ENCOUNTER — TELEPHONE (OUTPATIENT)
Dept: PRIMARY CARE CLINIC | Facility: CLINIC | Age: 64
End: 2024-09-09

## 2024-09-09 DIAGNOSIS — N39.0 URINARY TRACT INFECTION WITHOUT HEMATURIA, SITE UNSPECIFIED: Primary | ICD-10-CM

## 2024-09-09 RX ORDER — CIPROFLOXACIN 250 MG/1
250 TABLET, FILM COATED ORAL 2 TIMES DAILY
Qty: 14 TABLET | Refills: 0 | Status: SHIPPED | OUTPATIENT
Start: 2024-09-09 | End: 2024-09-16

## 2024-09-12 DIAGNOSIS — Z12.31 BREAST CANCER SCREENING BY MAMMOGRAM: ICD-10-CM

## 2024-09-19 ENCOUNTER — LAB (OUTPATIENT)
Dept: PRIMARY CARE CLINIC | Facility: CLINIC | Age: 64
End: 2024-09-19

## 2024-09-19 DIAGNOSIS — R53.82 CHRONIC FATIGUE: ICD-10-CM

## 2024-09-19 DIAGNOSIS — E55.9 VITAMIN D DEFICIENCY: ICD-10-CM

## 2024-09-19 DIAGNOSIS — Z13.29 SCREENING FOR ENDOCRINE, NUTRITIONAL, METABOLIC AND IMMUNITY DISORDER: ICD-10-CM

## 2024-09-19 DIAGNOSIS — R53.83 MALAISE AND FATIGUE: ICD-10-CM

## 2024-09-19 DIAGNOSIS — Z79.899 ENCOUNTER FOR LONG-TERM (CURRENT) USE OF MEDICATIONS: ICD-10-CM

## 2024-09-19 DIAGNOSIS — R79.89 OTHER SPECIFIED ABNORMAL FINDINGS OF BLOOD CHEMISTRY: ICD-10-CM

## 2024-09-19 DIAGNOSIS — R53.81 MALAISE AND FATIGUE: ICD-10-CM

## 2024-09-19 DIAGNOSIS — Z13.0 SCREENING FOR DEFICIENCY ANEMIA: ICD-10-CM

## 2024-09-19 DIAGNOSIS — Z13.0 SCREENING FOR ENDOCRINE, NUTRITIONAL, METABOLIC AND IMMUNITY DISORDER: ICD-10-CM

## 2024-09-19 DIAGNOSIS — Z13.220 SCREENING FOR LIPID DISORDERS: ICD-10-CM

## 2024-09-19 DIAGNOSIS — Z13.1 SCREENING FOR DIABETES MELLITUS: Primary | ICD-10-CM

## 2024-09-19 DIAGNOSIS — Z13.29 SCREENING FOR THYROID DISORDER: ICD-10-CM

## 2024-09-19 DIAGNOSIS — G25.81 RESTLESS LEGS SYNDROME: ICD-10-CM

## 2024-09-19 DIAGNOSIS — R79.9 ABNORMAL BLOOD CHEMISTRY: ICD-10-CM

## 2024-09-19 DIAGNOSIS — Z13.21 ENCOUNTER FOR VITAMIN DEFICIENCY SCREENING: ICD-10-CM

## 2024-09-19 DIAGNOSIS — Z13.21 SCREENING FOR ENDOCRINE, NUTRITIONAL, METABOLIC AND IMMUNITY DISORDER: ICD-10-CM

## 2024-09-19 DIAGNOSIS — Z13.228 SCREENING FOR ENDOCRINE, METABOLIC AND IMMUNITY DISORDER: ICD-10-CM

## 2024-09-19 DIAGNOSIS — R53.83 OTHER FATIGUE: ICD-10-CM

## 2024-09-19 DIAGNOSIS — Z13.0 SCREENING FOR ENDOCRINE, METABOLIC AND IMMUNITY DISORDER: ICD-10-CM

## 2024-09-19 DIAGNOSIS — Z13.228 SCREENING FOR METABOLIC DISORDER: ICD-10-CM

## 2024-09-19 DIAGNOSIS — Z13.29 SCREENING FOR ENDOCRINE, METABOLIC AND IMMUNITY DISORDER: ICD-10-CM

## 2024-09-19 DIAGNOSIS — Z13.228 SCREENING FOR ENDOCRINE, NUTRITIONAL, METABOLIC AND IMMUNITY DISORDER: ICD-10-CM

## 2024-09-19 DIAGNOSIS — Z13.1 SCREENING FOR DIABETES MELLITUS: ICD-10-CM

## 2024-09-19 DIAGNOSIS — R73.09 OTHER ABNORMAL GLUCOSE: ICD-10-CM

## 2024-09-19 DIAGNOSIS — R53.81 MALAISE: ICD-10-CM

## 2024-09-19 LAB
25(OH)D3 SERPL-MCNC: 32.4 NG/ML (ref 30–100)
ALBUMIN SERPL-MCNC: 3.7 G/DL (ref 3.2–4.6)
ALBUMIN/GLOB SERPL: 1.3 (ref 1–1.9)
ALP SERPL-CCNC: 117 U/L (ref 35–104)
ALT SERPL-CCNC: 13 U/L (ref 12–65)
ANION GAP SERPL CALC-SCNC: 14 MMOL/L (ref 9–18)
AST SERPL-CCNC: 24 U/L (ref 15–37)
BASOPHILS # BLD: 0.1 K/UL (ref 0–0.2)
BASOPHILS NFR BLD: 1 % (ref 0–2)
BILIRUB SERPL-MCNC: 0.4 MG/DL (ref 0–1.2)
BUN SERPL-MCNC: 17 MG/DL (ref 8–23)
CALCIUM SERPL-MCNC: 9.4 MG/DL (ref 8.8–10.2)
CHLORIDE SERPL-SCNC: 95 MMOL/L (ref 98–107)
CHOLEST SERPL-MCNC: 169 MG/DL (ref 0–200)
CO2 SERPL-SCNC: 28 MMOL/L (ref 20–28)
CREAT SERPL-MCNC: 0.93 MG/DL (ref 0.6–1.1)
DIFFERENTIAL METHOD BLD: ABNORMAL
EOSINOPHIL # BLD: 0.1 K/UL (ref 0–0.8)
EOSINOPHIL NFR BLD: 2 % (ref 0.5–7.8)
ERYTHROCYTE [DISTWIDTH] IN BLOOD BY AUTOMATED COUNT: 12.6 % (ref 11.9–14.6)
EST. AVERAGE GLUCOSE BLD GHB EST-MCNC: 109 MG/DL
GLOBULIN SER CALC-MCNC: 2.8 G/DL (ref 2.3–3.5)
GLUCOSE SERPL-MCNC: 125 MG/DL (ref 70–99)
HBA1C MFR BLD: 5.4 % (ref 0–5.6)
HCT VFR BLD AUTO: 41.4 % (ref 35.8–46.3)
HDLC SERPL-MCNC: 43 MG/DL (ref 40–60)
HDLC SERPL: 3.9 (ref 0–5)
HGB BLD-MCNC: 12.8 G/DL (ref 11.7–15.4)
IMM GRANULOCYTES # BLD AUTO: 0 K/UL (ref 0–0.5)
IMM GRANULOCYTES NFR BLD AUTO: 0 % (ref 0–5)
LDLC SERPL CALC-MCNC: 103 MG/DL (ref 0–100)
LYMPHOCYTES # BLD: 1.6 K/UL (ref 0.5–4.6)
LYMPHOCYTES NFR BLD: 21 % (ref 13–44)
MAGNESIUM SERPL-MCNC: 1.8 MG/DL (ref 1.8–2.4)
MCH RBC QN AUTO: 32.2 PG (ref 26.1–32.9)
MCHC RBC AUTO-ENTMCNC: 30.9 G/DL (ref 31.4–35)
MCV RBC AUTO: 104 FL (ref 82–102)
MONOCYTES # BLD: 0.7 K/UL (ref 0.1–1.3)
MONOCYTES NFR BLD: 9 % (ref 4–12)
NEUTS SEG # BLD: 5.3 K/UL (ref 1.7–8.2)
NEUTS SEG NFR BLD: 67 % (ref 43–78)
NRBC # BLD: 0 K/UL (ref 0–0.2)
PLATELET # BLD AUTO: 286 K/UL (ref 150–450)
PMV BLD AUTO: 11.2 FL (ref 9.4–12.3)
POTASSIUM SERPL-SCNC: 4.5 MMOL/L (ref 3.5–5.1)
PROT SERPL-MCNC: 6.5 G/DL (ref 6.3–8.2)
RBC # BLD AUTO: 3.98 M/UL (ref 4.05–5.2)
SODIUM SERPL-SCNC: 137 MMOL/L (ref 136–145)
T4 FREE SERPL-MCNC: 1.5 NG/DL (ref 0.9–1.7)
TRIGL SERPL-MCNC: 112 MG/DL (ref 0–150)
TSH W FREE THYROID IF ABNORMAL: 4.53 UIU/ML (ref 0.27–4.2)
VIT B12 SERPL-MCNC: 2317 PG/ML (ref 193–986)
VLDLC SERPL CALC-MCNC: 22 MG/DL (ref 6–23)
WBC # BLD AUTO: 7.9 K/UL (ref 4.3–11.1)

## 2024-09-26 ENCOUNTER — TELEMEDICINE (OUTPATIENT)
Dept: PRIMARY CARE CLINIC | Facility: CLINIC | Age: 64
End: 2024-09-26

## 2024-09-26 DIAGNOSIS — E03.9 ACQUIRED HYPOTHYROIDISM: ICD-10-CM

## 2024-09-26 DIAGNOSIS — N18.30 TYPE 2 DIABETES MELLITUS WITH STAGE 3 CHRONIC KIDNEY DISEASE, WITHOUT LONG-TERM CURRENT USE OF INSULIN, UNSPECIFIED WHETHER STAGE 3A OR 3B CKD (HCC): ICD-10-CM

## 2024-09-26 DIAGNOSIS — G43.E09 CHRONIC MIGRAINE WITH AURA WITHOUT STATUS MIGRAINOSUS, NOT INTRACTABLE: ICD-10-CM

## 2024-09-26 DIAGNOSIS — G47.33 OSA (OBSTRUCTIVE SLEEP APNEA): ICD-10-CM

## 2024-09-26 DIAGNOSIS — J41.8 MIXED SIMPLE AND MUCOPURULENT CHRONIC BRONCHITIS (HCC): Primary | ICD-10-CM

## 2024-09-26 DIAGNOSIS — Z12.11 SCREENING FOR COLON CANCER: ICD-10-CM

## 2024-09-26 DIAGNOSIS — I10 ESSENTIAL HYPERTENSION: ICD-10-CM

## 2024-09-26 DIAGNOSIS — Z00.00 MEDICARE ANNUAL WELLNESS VISIT, SUBSEQUENT: ICD-10-CM

## 2024-09-26 DIAGNOSIS — F33.0 DEPRESSION, MAJOR, RECURRENT, MILD (HCC): ICD-10-CM

## 2024-09-26 DIAGNOSIS — G25.81 RESTLESS LEGS SYNDROME: ICD-10-CM

## 2024-09-26 DIAGNOSIS — E11.22 TYPE 2 DIABETES MELLITUS WITH STAGE 3 CHRONIC KIDNEY DISEASE, WITHOUT LONG-TERM CURRENT USE OF INSULIN, UNSPECIFIED WHETHER STAGE 3A OR 3B CKD (HCC): ICD-10-CM

## 2024-09-26 DIAGNOSIS — K21.9 GASTROESOPHAGEAL REFLUX DISEASE, UNSPECIFIED WHETHER ESOPHAGITIS PRESENT: ICD-10-CM

## 2024-09-26 PROBLEM — R05.1 ACUTE COUGH: Status: RESOLVED | Noted: 2024-03-26 | Resolved: 2024-09-26

## 2024-09-26 PROBLEM — K56.609 SMALL BOWEL OBSTRUCTION (HCC): Status: RESOLVED | Noted: 2020-06-27 | Resolved: 2024-09-26

## 2024-09-26 PROBLEM — S92.421A DISPLACED FRACTURE OF DISTAL PHALANX OF RIGHT GREAT TOE, INITIAL ENCOUNTER FOR CLOSED FRACTURE: Status: RESOLVED | Noted: 2024-04-17 | Resolved: 2024-09-26

## 2024-09-26 PROBLEM — R30.0 DYSURIA: Status: RESOLVED | Noted: 2023-07-19 | Resolved: 2024-09-26

## 2024-09-26 PROBLEM — J06.9 UPPER RESPIRATORY TRACT INFECTION: Status: RESOLVED | Noted: 2024-05-01 | Resolved: 2024-09-26

## 2024-09-26 PROBLEM — S62.356A CLOSED NONDISPLACED FRACTURE OF SHAFT OF FIFTH METACARPAL BONE OF RIGHT HAND: Status: RESOLVED | Noted: 2024-04-09 | Resolved: 2024-09-26

## 2024-09-26 RX ORDER — SUMATRIPTAN 50 MG/1
50 TABLET, FILM COATED ORAL
Qty: 9 TABLET | Refills: 2 | Status: SHIPPED | OUTPATIENT
Start: 2024-09-26 | End: 2024-09-26

## 2024-09-26 SDOH — ECONOMIC STABILITY: FOOD INSECURITY: WITHIN THE PAST 12 MONTHS, THE FOOD YOU BOUGHT JUST DIDN'T LAST AND YOU DIDN'T HAVE MONEY TO GET MORE.: NEVER TRUE

## 2024-09-26 SDOH — ECONOMIC STABILITY: FOOD INSECURITY: WITHIN THE PAST 12 MONTHS, YOU WORRIED THAT YOUR FOOD WOULD RUN OUT BEFORE YOU GOT MONEY TO BUY MORE.: NEVER TRUE

## 2024-09-26 SDOH — ECONOMIC STABILITY: INCOME INSECURITY: HOW HARD IS IT FOR YOU TO PAY FOR THE VERY BASICS LIKE FOOD, HOUSING, MEDICAL CARE, AND HEATING?: NOT VERY HARD

## 2024-09-26 ASSESSMENT — PATIENT HEALTH QUESTIONNAIRE - PHQ9
SUM OF ALL RESPONSES TO PHQ QUESTIONS 1-9: 0
SUM OF ALL RESPONSES TO PHQ9 QUESTIONS 1 & 2: 0
SUM OF ALL RESPONSES TO PHQ QUESTIONS 1-9: 0
7. TROUBLE CONCENTRATING ON THINGS, SUCH AS READING THE NEWSPAPER OR WATCHING TELEVISION: NOT AT ALL
1. LITTLE INTEREST OR PLEASURE IN DOING THINGS: NOT AT ALL
SUM OF ALL RESPONSES TO PHQ QUESTIONS 1-9: 0
10. IF YOU CHECKED OFF ANY PROBLEMS, HOW DIFFICULT HAVE THESE PROBLEMS MADE IT FOR YOU TO DO YOUR WORK, TAKE CARE OF THINGS AT HOME, OR GET ALONG WITH OTHER PEOPLE: NOT DIFFICULT AT ALL
4. FEELING TIRED OR HAVING LITTLE ENERGY: NOT AT ALL
9. THOUGHTS THAT YOU WOULD BE BETTER OFF DEAD, OR OF HURTING YOURSELF: NOT AT ALL
SUM OF ALL RESPONSES TO PHQ QUESTIONS 1-9: 0
6. FEELING BAD ABOUT YOURSELF - OR THAT YOU ARE A FAILURE OR HAVE LET YOURSELF OR YOUR FAMILY DOWN: NOT AT ALL
5. POOR APPETITE OR OVEREATING: NOT AT ALL
2. FEELING DOWN, DEPRESSED OR HOPELESS: NOT AT ALL
8. MOVING OR SPEAKING SO SLOWLY THAT OTHER PEOPLE COULD HAVE NOTICED. OR THE OPPOSITE, BEING SO FIGETY OR RESTLESS THAT YOU HAVE BEEN MOVING AROUND A LOT MORE THAN USUAL: NOT AT ALL
3. TROUBLE FALLING OR STAYING ASLEEP: NOT AT ALL

## 2024-09-26 ASSESSMENT — ENCOUNTER SYMPTOMS
CONSTIPATION: 0
SORE THROAT: 0
EYES NEGATIVE: 1
SHORTNESS OF BREATH: 0
COUGH: 0
ALLERGIC/IMMUNOLOGIC NEGATIVE: 1
SINUS PRESSURE: 0
DIARRHEA: 0
CHEST TIGHTNESS: 0

## 2024-09-26 ASSESSMENT — LIFESTYLE VARIABLES: HOW MANY STANDARD DRINKS CONTAINING ALCOHOL DO YOU HAVE ON A TYPICAL DAY: PATIENT DOES NOT DRINK

## 2024-10-01 ENCOUNTER — TELEPHONE (OUTPATIENT)
Dept: PRIMARY CARE CLINIC | Facility: CLINIC | Age: 64
End: 2024-10-01

## 2024-10-01 DIAGNOSIS — G47.33 OSA (OBSTRUCTIVE SLEEP APNEA): Primary | ICD-10-CM

## 2024-10-07 ENCOUNTER — PATIENT MESSAGE (OUTPATIENT)
Dept: PRIMARY CARE CLINIC | Facility: CLINIC | Age: 64
End: 2024-10-07

## 2024-10-07 DIAGNOSIS — E11.9 TYPE 2 DIABETES MELLITUS WITHOUT COMPLICATION, WITHOUT LONG-TERM CURRENT USE OF INSULIN (HCC): ICD-10-CM

## 2024-10-07 DIAGNOSIS — I10 ESSENTIAL HYPERTENSION: ICD-10-CM

## 2024-10-07 RX ORDER — AMLODIPINE BESYLATE 10 MG/1
10 TABLET ORAL DAILY
Qty: 90 TABLET | Refills: 1 | Status: SHIPPED | OUTPATIENT
Start: 2024-10-07

## 2024-10-07 RX ORDER — LOSARTAN POTASSIUM 100 MG/1
50 TABLET ORAL DAILY
Qty: 45 TABLET | Refills: 1 | Status: SHIPPED | OUTPATIENT
Start: 2024-10-07 | End: 2025-04-05

## 2024-10-07 RX ORDER — PIOGLITAZONEHYDROCHLORIDE 30 MG/1
30 TABLET ORAL DAILY
Qty: 90 TABLET | Refills: 1 | Status: SHIPPED | OUTPATIENT
Start: 2024-10-07

## 2024-10-30 DIAGNOSIS — F33.0 DEPRESSION, MAJOR, RECURRENT, MILD (HCC): ICD-10-CM

## 2024-10-30 RX ORDER — DULOXETIN HYDROCHLORIDE 60 MG/1
60 CAPSULE, DELAYED RELEASE ORAL 2 TIMES DAILY
Qty: 180 CAPSULE | Refills: 1 | OUTPATIENT
Start: 2024-10-30

## 2024-11-08 DIAGNOSIS — K21.9 GASTROESOPHAGEAL REFLUX DISEASE, UNSPECIFIED WHETHER ESOPHAGITIS PRESENT: ICD-10-CM

## 2024-11-08 RX ORDER — FAMOTIDINE 20 MG/1
20 TABLET, FILM COATED ORAL 2 TIMES DAILY
Qty: 180 TABLET | Refills: 1 | Status: SHIPPED | OUTPATIENT
Start: 2024-11-08

## 2024-11-20 ENCOUNTER — TELEMEDICINE (OUTPATIENT)
Dept: PRIMARY CARE CLINIC | Facility: CLINIC | Age: 64
End: 2024-11-20

## 2024-11-20 DIAGNOSIS — R53.83 MALAISE AND FATIGUE: ICD-10-CM

## 2024-11-20 DIAGNOSIS — N18.30 TYPE 2 DIABETES MELLITUS WITH STAGE 3 CHRONIC KIDNEY DISEASE, WITHOUT LONG-TERM CURRENT USE OF INSULIN, UNSPECIFIED WHETHER STAGE 3A OR 3B CKD (HCC): ICD-10-CM

## 2024-11-20 DIAGNOSIS — G43.E09 CHRONIC MIGRAINE WITH AURA WITHOUT STATUS MIGRAINOSUS, NOT INTRACTABLE: ICD-10-CM

## 2024-11-20 DIAGNOSIS — R79.9 ABNORMAL BLOOD CHEMISTRY: ICD-10-CM

## 2024-11-20 DIAGNOSIS — Z13.21 ENCOUNTER FOR VITAMIN DEFICIENCY SCREENING: ICD-10-CM

## 2024-11-20 DIAGNOSIS — Z13.0 SCREENING FOR IRON DEFICIENCY ANEMIA: ICD-10-CM

## 2024-11-20 DIAGNOSIS — I10 ESSENTIAL HYPERTENSION: ICD-10-CM

## 2024-11-20 DIAGNOSIS — E03.9 ACQUIRED HYPOTHYROIDISM: ICD-10-CM

## 2024-11-20 DIAGNOSIS — R73.09 OTHER ABNORMAL GLUCOSE: ICD-10-CM

## 2024-11-20 DIAGNOSIS — D50.9 IRON DEFICIENCY ANEMIA, UNSPECIFIED IRON DEFICIENCY ANEMIA TYPE: ICD-10-CM

## 2024-11-20 DIAGNOSIS — Z13.29 SCREENING FOR ENDOCRINE, NUTRITIONAL, METABOLIC AND IMMUNITY DISORDER: ICD-10-CM

## 2024-11-20 DIAGNOSIS — E55.9 VITAMIN D DEFICIENCY: ICD-10-CM

## 2024-11-20 DIAGNOSIS — Z13.29 SCREENING FOR THYROID DISORDER: ICD-10-CM

## 2024-11-20 DIAGNOSIS — R79.89 OTHER SPECIFIED ABNORMAL FINDINGS OF BLOOD CHEMISTRY: ICD-10-CM

## 2024-11-20 DIAGNOSIS — Z13.21 SCREENING FOR ENDOCRINE, NUTRITIONAL, METABOLIC AND IMMUNITY DISORDER: ICD-10-CM

## 2024-11-20 DIAGNOSIS — M17.12 PRIMARY OSTEOARTHRITIS OF LEFT KNEE: ICD-10-CM

## 2024-11-20 DIAGNOSIS — R53.82 CHRONIC FATIGUE: ICD-10-CM

## 2024-11-20 DIAGNOSIS — R53.81 MALAISE AND FATIGUE: ICD-10-CM

## 2024-11-20 DIAGNOSIS — Z13.0 SCREENING FOR ENDOCRINE, METABOLIC AND IMMUNITY DISORDER: ICD-10-CM

## 2024-11-20 DIAGNOSIS — K21.9 GASTROESOPHAGEAL REFLUX DISEASE, UNSPECIFIED WHETHER ESOPHAGITIS PRESENT: ICD-10-CM

## 2024-11-20 DIAGNOSIS — Z13.1 SCREENING FOR DIABETES MELLITUS: ICD-10-CM

## 2024-11-20 DIAGNOSIS — Z13.228 SCREENING FOR ENDOCRINE, NUTRITIONAL, METABOLIC AND IMMUNITY DISORDER: ICD-10-CM

## 2024-11-20 DIAGNOSIS — M17.11 PRIMARY OSTEOARTHRITIS OF RIGHT KNEE: ICD-10-CM

## 2024-11-20 DIAGNOSIS — F13.20 SEDATIVE, HYPNOTIC OR ANXIOLYTIC DEPENDENCE, UNCOMPLICATED (HCC): ICD-10-CM

## 2024-11-20 DIAGNOSIS — Z13.0 SCREENING FOR ENDOCRINE, NUTRITIONAL, METABOLIC AND IMMUNITY DISORDER: ICD-10-CM

## 2024-11-20 DIAGNOSIS — R79.0 LOW MAGNESIUM LEVEL: Primary | ICD-10-CM

## 2024-11-20 DIAGNOSIS — E11.9 TYPE 2 DIABETES MELLITUS WITHOUT COMPLICATION, WITHOUT LONG-TERM CURRENT USE OF INSULIN (HCC): ICD-10-CM

## 2024-11-20 DIAGNOSIS — Z13.228 SCREENING FOR METABOLIC DISORDER: ICD-10-CM

## 2024-11-20 DIAGNOSIS — F33.0 DEPRESSION, MAJOR, RECURRENT, MILD (HCC): ICD-10-CM

## 2024-11-20 DIAGNOSIS — E11.22 TYPE 2 DIABETES MELLITUS WITH STAGE 3 CHRONIC KIDNEY DISEASE, WITHOUT LONG-TERM CURRENT USE OF INSULIN, UNSPECIFIED WHETHER STAGE 3A OR 3B CKD (HCC): ICD-10-CM

## 2024-11-20 DIAGNOSIS — F41.9 ANXIETY: ICD-10-CM

## 2024-11-20 DIAGNOSIS — Z79.899 ENCOUNTER FOR LONG-TERM (CURRENT) USE OF MEDICATIONS: ICD-10-CM

## 2024-11-20 DIAGNOSIS — Z13.0 SCREENING FOR DEFICIENCY ANEMIA: ICD-10-CM

## 2024-11-20 DIAGNOSIS — R53.83 OTHER FATIGUE: ICD-10-CM

## 2024-11-20 DIAGNOSIS — Z13.29 SCREENING FOR ENDOCRINE, METABOLIC AND IMMUNITY DISORDER: ICD-10-CM

## 2024-11-20 DIAGNOSIS — Z13.220 SCREENING FOR LIPID DISORDERS: ICD-10-CM

## 2024-11-20 DIAGNOSIS — Z13.228 SCREENING FOR ENDOCRINE, METABOLIC AND IMMUNITY DISORDER: ICD-10-CM

## 2024-11-20 DIAGNOSIS — R53.81 MALAISE: ICD-10-CM

## 2024-11-20 RX ORDER — CLONAZEPAM 1 MG/1
1 TABLET ORAL 2 TIMES DAILY
Qty: 60 TABLET | Refills: 2 | Status: SHIPPED | OUTPATIENT
Start: 2024-11-20 | End: 2025-02-18

## 2024-11-20 RX ORDER — HYDROCHLOROTHIAZIDE 25 MG/1
25 TABLET ORAL DAILY
Qty: 90 TABLET | Refills: 3 | Status: SHIPPED | OUTPATIENT
Start: 2024-11-20

## 2024-11-20 RX ORDER — GLIMEPIRIDE 1 MG/1
1 TABLET ORAL
Qty: 90 TABLET | Refills: 1 | Status: SHIPPED | OUTPATIENT
Start: 2024-11-20

## 2024-11-20 RX ORDER — SUMATRIPTAN 50 MG/1
50 TABLET, FILM COATED ORAL
Qty: 9 TABLET | Refills: 2 | Status: SHIPPED | OUTPATIENT
Start: 2024-11-20 | End: 2024-11-20

## 2024-11-20 RX ORDER — OMEPRAZOLE 40 MG/1
40 CAPSULE, DELAYED RELEASE ORAL DAILY
Qty: 90 CAPSULE | Refills: 1 | Status: SHIPPED | OUTPATIENT
Start: 2024-11-20

## 2024-11-20 RX ORDER — PIOGLITAZONE 30 MG/1
30 TABLET ORAL DAILY
Qty: 90 TABLET | Refills: 1 | Status: SHIPPED | OUTPATIENT
Start: 2024-11-20

## 2024-11-20 RX ORDER — DULOXETIN HYDROCHLORIDE 60 MG/1
60 CAPSULE, DELAYED RELEASE ORAL 2 TIMES DAILY
Qty: 180 CAPSULE | Refills: 1 | Status: SHIPPED | OUTPATIENT
Start: 2024-11-20 | End: 2025-05-19

## 2024-11-20 ASSESSMENT — ENCOUNTER SYMPTOMS
CONSTIPATION: 0
ALLERGIC/IMMUNOLOGIC NEGATIVE: 1
SINUS PRESSURE: 0
COUGH: 0
CHEST TIGHTNESS: 0
SHORTNESS OF BREATH: 0
DIARRHEA: 0
EYES NEGATIVE: 1
SORE THROAT: 0

## 2024-11-20 NOTE — PROGRESS NOTES
.  
verbalized understanding. Patient declined all other medications (OTC, provided by clinic and prescribed) as well as additional testing/imaging/diagnostics at this time. Patient aware of risks associated with declining treatment/recommendations and/or non-compliance with plan of care.    *Side effects, adverse effects, risks versus benefits associated with medications prescribed/recommended were discussed with the patient. Patient verbalized understanding. All questions answered.    *Patient was encouraged to return to the clinic and/or PCP. Or seek emergent care if worsening signs and symptoms warrant immediate evaluation including, but not limited to HA, blurred vision, facial asymmetry, speech disturbance, difficulty with ambulation/gait, numbness, tingling, weakness, syncope, chest pain (with or without radiation), left arm pain, jaw pain, changes in hearing (loss), fever, unexplained sweating, malaise/fatigue, difficulty swallowing, mental changes (confusion, AMS), lightheadedness/dizziness, difficulty breathing, or shortness of breath.    I have reviewed the patient's medication list, past medical, family, social, and surgical history in detail and updated the patient record appropriately.    I have reviewed the patient's vital signs and discussed risks associated with any abnormal vital signs (during visit and following this visit) as well as appropriate parameters with the patient. Patient verbalized understanding. Patient agreed to seek emergent care if vital signs are above or below the parameters discussed.     Explanatory note: Be assured that the information provided to create your medical record comes from your provider. The written transcription portion of this note is prepared electronically by voice-recognition software. At times, there may be some errors in capitalization, punctuation, tense, or context that are inherent in the system, but your provider reviews the note for content and to ensure

## 2024-12-10 ENCOUNTER — E-VISIT (OUTPATIENT)
Dept: PRIMARY CARE CLINIC | Facility: CLINIC | Age: 64
End: 2024-12-10
Payer: MEDICARE

## 2024-12-10 DIAGNOSIS — U07.1 COVID: Primary | ICD-10-CM

## 2024-12-10 PROCEDURE — 99422 OL DIG E/M SVC 11-20 MIN: CPT | Performed by: NURSE PRACTITIONER

## 2024-12-10 NOTE — PROGRESS NOTES
Patient   How often have you experienced these symptoms in the past? Occasionally   When was the last episode? Na   What treatments have worked in the past? Antibiotics   What has not worked? The oness that i am allergic to       [facility] Evisit Dysuria Questionnaire 14    Question 12/10/2024 12:36 AM EST - Filed by Patient   Are you currently using any medications or other treatments for these symptoms? No      [facility] Mychart Evisit Dysuria Questionnaire 16    Question 12/10/2024 12:36 AM EST - Filed by Patient   Are there any other issues related to your urinary symptoms that you would like to share?   No     Patient Review of Clinical Information    Problems   The patient or proxy has not reviewed this information.     Medications   The patient or proxy has not reviewed this information.     Allergies   The patient or proxy has not reviewed this information.     Patient Preferred Pharmacy    [id number] [place] [facility] [address] - P [phone] - F [phone]

## 2024-12-19 ENCOUNTER — E-VISIT (OUTPATIENT)
Dept: PRIMARY CARE CLINIC | Facility: CLINIC | Age: 64
End: 2024-12-19
Payer: MEDICARE

## 2024-12-19 DIAGNOSIS — R19.7 DIARRHEA, UNSPECIFIED TYPE: Primary | ICD-10-CM

## 2024-12-19 DIAGNOSIS — R19.7 DIARRHEA, UNSPECIFIED TYPE: ICD-10-CM

## 2024-12-19 PROCEDURE — 99423 OL DIG E/M SVC 21+ MIN: CPT | Performed by: NURSE PRACTITIONER

## 2024-12-19 RX ORDER — DICYCLOMINE HYDROCHLORIDE 10 MG/1
10 CAPSULE ORAL
Qty: 120 CAPSULE | Refills: 0 | Status: SHIPPED | OUTPATIENT
Start: 2024-12-19

## 2024-12-19 RX ORDER — DICYCLOMINE HYDROCHLORIDE 10 MG/1
CAPSULE ORAL
Qty: 360 CAPSULE | OUTPATIENT
Start: 2024-12-19

## 2024-12-19 NOTE — PROGRESS NOTES
Paige Sherman (1960) initiated an asynchronous digital communication through [employer].    HPI: per patient questionnaire     Exam: not applicable    Diagnoses and all orders for this visit:  Diagnoses and all orders for this visit:    Diarrhea, unspecified type  -     dicyclomine (BENTYL) 10 MG capsule; Take 1 capsule by mouth 4 times daily (before meals and nightly)        Referred to GI in 2022 and March 2024. Recommend f/u with them or get scheduled for 1st visit.    Time: EV3 - 21 or more minutes were spent on the digital evaluation and management of this patient.    Madiha Hinkle, APRN - NP     [facility] Adult Acute Vomiting And/Or Diarrhea Evisit Questionnaire 1    Question 12/19/2024  3:34 PM EST - Filed by Patient   When did your symptoms begin? 1-2 weeks   Overall, are your symptoms getting worse, staying about the same, or getting better? About the same   Do you have any known chronic gastrointestinal medical condition(s), such as Crohn's Disease, Ulcerative Colitis, irritable bowel syndrome or stomach ulcers? No   If yes, please describe.    Do you have any other chronic illnesses, such as diabetes, heart disease, asthma, emphysema, HIV, cancer, or kidney failure, or have you recently taken any medications that can weaken your ability to fight infection, such as prednisone? Yes   If yes, please enter the details of your other illness(es) or medications that can weaken your ability to fight infection. Diabetes   Have you been hospitalized within the past 3 months? No   Have you had any new medical events or problems since the last time you saw your provider? No   If yes, please describe.    Have you taken any antibiotics within the past 3 months? No   If yes, please enter when you took the antibiotic(s) and the name(s) of the medication(s).    Before this illness started, had you been taking any other new medications or supplements, including over the counter, since you last saw your provider? No

## 2025-01-02 ENCOUNTER — E-VISIT (OUTPATIENT)
Dept: PRIMARY CARE CLINIC | Facility: CLINIC | Age: 65
End: 2025-01-02
Payer: MEDICARE

## 2025-01-02 DIAGNOSIS — R05.1 ACUTE COUGH: ICD-10-CM

## 2025-01-02 DIAGNOSIS — J06.9 UPPER RESPIRATORY TRACT INFECTION, UNSPECIFIED TYPE: Primary | ICD-10-CM

## 2025-01-02 PROCEDURE — 99422 OL DIG E/M SVC 11-20 MIN: CPT | Performed by: NURSE PRACTITIONER

## 2025-01-02 RX ORDER — BROMPHENIRAMINE MALEATE, PSEUDOEPHEDRINE HYDROCHLORIDE, AND DEXTROMETHORPHAN HYDROBROMIDE 2; 30; 10 MG/5ML; MG/5ML; MG/5ML
2.5 SYRUP ORAL 4 TIMES DAILY PRN
Qty: 200 ML | Refills: 0 | Status: SHIPPED | OUTPATIENT
Start: 2025-01-02 | End: 2025-01-22

## 2025-01-02 RX ORDER — GUAIFENESIN 600 MG/1
600 TABLET, EXTENDED RELEASE ORAL 2 TIMES DAILY
Qty: 30 TABLET | Refills: 0 | Status: SHIPPED | OUTPATIENT
Start: 2025-01-02 | End: 2025-01-17

## 2025-01-02 RX ORDER — AZITHROMYCIN 250 MG/1
250 TABLET, FILM COATED ORAL SEE ADMIN INSTRUCTIONS
Qty: 6 TABLET | Refills: 0 | Status: SHIPPED | OUTPATIENT
Start: 2025-01-02 | End: 2025-01-07

## 2025-01-02 NOTE — PROGRESS NOTES
Paige Sherman (1960) initiated an asynchronous digital communication through [employer].    HPI: per patient questionnaire     Exam: not applicable    Diagnoses and all orders for this visit:  Diagnoses and all orders for this visit:    Upper respiratory tract infection, unspecified type  -     azithromycin (ZITHROMAX) 250 MG tablet; Take 1 tablet by mouth See Admin Instructions for 5 days 500mg on day 1 followed by 250mg on days 2 - 5  -     guaiFENesin (MUCINEX) 600 MG extended release tablet; Take 1 tablet by mouth 2 times daily for 15 days    Acute cough  -     brompheniramine-pseudoephedrine-DM 2-30-10 MG/5ML syrup; Take 2.5 mLs by mouth 4 times daily as needed for Cough or Congestion          Time: EV2 - 11-20 minutes were spent on the digital evaluation and management of this patient.     PIPPA Martin - NP    [facility] E-Visit Headache 1    Question 1/2/2025 10:38 AM EST - Filed by Patient   Are you experiencing any of the following symptoms? None of the above   Are you experiencing any of the following symptoms? None of the above   Do any of the following apply to you? None of the above          [employer] E-Visit Headache 2    Question 1/2/2025 10:38 AM EST - Filed by Patient   What were you doing at the time of onset of headache? Headache comes and goes, not really bad. Just enough to be annoyed.   How long have you had symptoms? 1-2 days   Tell us about location and severity of headache.  Check all that apply. Front of the head    Mild headache   Tell us about the type of pain you are suffering from. Dull pressure   What describes your headaches the best?    What is the course of your headache? Pick all that apply.    Tell us about your symptoms.  Check all that apply. I have runny nose with clear nasal discharge    I have increased sneezing and cough   Have you had similar headaches in the past? Yes   What is different about this headache compared to your headaches in the past? I think that is

## 2025-01-14 ENCOUNTER — TELEPHONE (OUTPATIENT)
Dept: PRIMARY CARE CLINIC | Facility: CLINIC | Age: 65
End: 2025-01-14

## 2025-01-14 ENCOUNTER — TELEMEDICINE (OUTPATIENT)
Dept: PRIMARY CARE CLINIC | Facility: CLINIC | Age: 65
End: 2025-01-14

## 2025-01-14 DIAGNOSIS — J42 CHRONIC BRONCHITIS, UNSPECIFIED CHRONIC BRONCHITIS TYPE (HCC): Primary | ICD-10-CM

## 2025-01-14 DIAGNOSIS — R11.0 NAUSEA: Primary | ICD-10-CM

## 2025-01-14 RX ORDER — ONDANSETRON 4 MG/1
4 TABLET, FILM COATED ORAL 3 TIMES DAILY PRN
Qty: 30 TABLET | Refills: 0 | Status: SHIPPED | OUTPATIENT
Start: 2025-01-14

## 2025-01-14 RX ORDER — PREDNISONE 5 MG/1
1 TABLET ORAL SEE ADMIN INSTRUCTIONS
Qty: 21 EACH | Refills: 0 | Status: SHIPPED | OUTPATIENT
Start: 2025-01-14

## 2025-01-14 SDOH — ECONOMIC STABILITY: INCOME INSECURITY: IN THE LAST 12 MONTHS, WAS THERE A TIME WHEN YOU WERE NOT ABLE TO PAY THE MORTGAGE OR RENT ON TIME?: NO

## 2025-01-14 SDOH — ECONOMIC STABILITY: FOOD INSECURITY: WITHIN THE PAST 12 MONTHS, THE FOOD YOU BOUGHT JUST DIDN'T LAST AND YOU DIDN'T HAVE MONEY TO GET MORE.: NEVER TRUE

## 2025-01-14 SDOH — ECONOMIC STABILITY: TRANSPORTATION INSECURITY
IN THE PAST 12 MONTHS, HAS THE LACK OF TRANSPORTATION KEPT YOU FROM MEDICAL APPOINTMENTS OR FROM GETTING MEDICATIONS?: NO

## 2025-01-14 SDOH — ECONOMIC STABILITY: FOOD INSECURITY: WITHIN THE PAST 12 MONTHS, YOU WORRIED THAT YOUR FOOD WOULD RUN OUT BEFORE YOU GOT MONEY TO BUY MORE.: NEVER TRUE

## 2025-01-14 ASSESSMENT — PATIENT HEALTH QUESTIONNAIRE - PHQ9
5. POOR APPETITE OR OVEREATING: NOT AT ALL
3. TROUBLE FALLING OR STAYING ASLEEP: NOT AT ALL
9. THOUGHTS THAT YOU WOULD BE BETTER OFF DEAD, OR OF HURTING YOURSELF: NOT AT ALL
1. LITTLE INTEREST OR PLEASURE IN DOING THINGS: NOT AT ALL
6. FEELING BAD ABOUT YOURSELF - OR THAT YOU ARE A FAILURE OR HAVE LET YOURSELF OR YOUR FAMILY DOWN: NOT AT ALL
10. IF YOU CHECKED OFF ANY PROBLEMS, HOW DIFFICULT HAVE THESE PROBLEMS MADE IT FOR YOU TO DO YOUR WORK, TAKE CARE OF THINGS AT HOME, OR GET ALONG WITH OTHER PEOPLE: NOT DIFFICULT AT ALL
SUM OF ALL RESPONSES TO PHQ9 QUESTIONS 1 & 2: 0
SUM OF ALL RESPONSES TO PHQ QUESTIONS 1-9: 0
2. FEELING DOWN, DEPRESSED OR HOPELESS: NOT AT ALL
8. MOVING OR SPEAKING SO SLOWLY THAT OTHER PEOPLE COULD HAVE NOTICED. OR THE OPPOSITE, BEING SO FIGETY OR RESTLESS THAT YOU HAVE BEEN MOVING AROUND A LOT MORE THAN USUAL: NOT AT ALL
SUM OF ALL RESPONSES TO PHQ QUESTIONS 1-9: 0
7. TROUBLE CONCENTRATING ON THINGS, SUCH AS READING THE NEWSPAPER OR WATCHING TELEVISION: NOT AT ALL
SUM OF ALL RESPONSES TO PHQ QUESTIONS 1-9: 0
SUM OF ALL RESPONSES TO PHQ QUESTIONS 1-9: 0
4. FEELING TIRED OR HAVING LITTLE ENERGY: NOT AT ALL

## 2025-01-14 ASSESSMENT — ENCOUNTER SYMPTOMS
NAUSEA: 1
SORE THROAT: 1

## 2025-01-14 NOTE — PROGRESS NOTES
Home: [address]  [place] [address]  Provider was located at Facility (Appt Dept): [address] [place] [address] [place] [address]  Confirm you are appropriately licensed, registered, or certified to deliver care in the state where the patient is located as indicated above. If you are not or unsure, please re-schedule the visit: Yes, I confirm.       Total time spent on this encounter:  9 min    --PIPPA Martin - NP on 1/14/2025 at 11:45 AM    An electronic signature was used to authenticate this note.    ADDITIONAL EDUCATION    Last 7 days of labs:    No visits with results within 1 Week(s) from this visit.   Latest known visit with results is:   Lab on 09/19/2024   Component Date Value Ref Range Status    Magnesium 09/19/2024 1.8  1.8 - 2.4 mg/dL Final    Cholesterol, Total 09/19/2024 169  0 - 200 MG/DL Final    Comment: Borderline High: 200-239 mg/dL  High: Greater than or equal to 240 mg/dL      Triglycerides 09/19/2024 112  0 - 150 MG/DL Final    Comment: Borderline High: 150-199 mg/dL, High: 200-499 mg/dL  Very High: Greater than or equal to 500 mg/dL      HDL 09/19/2024 43  40 - 60 MG/DL Final    LDL Cholesterol 09/19/2024 103 (H)  0 - 100 MG/DL Final    Comment: Near Optimal: 100-129 mg/dL  Borderline High: 130-159, High: 160-189 mg/dL  Very High: Greater than or equal to 190 mg/dL      VLDL Cholesterol Calculated 09/19/2024 22  6 - 23 MG/DL Final    Chol/HDL Ratio 09/19/2024 3.9  0.0 - 5.0   Final    Sodium 09/19/2024 137  136 - 145 mmol/L Final    Potassium 09/19/2024 4.5  3.5 - 5.1 mmol/L Final    Specimen hemolysis has exceeded the interference as defined by [employer]. Value may be falsely increased.    Chloride 09/19/2024 95 (L)  98 - 107 mmol/L Final    CO2 09/19/2024 28  20 - 28 mmol/L Final    Anion Gap 09/19/2024 14  9 - 18 mmol/L Final    Glucose 09/19/2024 125 (H)  70 - 99 mg/dL Final    Comment: <70 mg/dL Consistent with, but not fully diagnostic of hypoglycemia.  100 - 125 mg/dL

## 2025-01-29 ENCOUNTER — E-VISIT (OUTPATIENT)
Dept: PRIMARY CARE CLINIC | Facility: CLINIC | Age: 65
End: 2025-01-29
Payer: MEDICARE

## 2025-01-29 DIAGNOSIS — J01.01 ACUTE RECURRENT MAXILLARY SINUSITIS: Primary | ICD-10-CM

## 2025-01-29 PROCEDURE — 99422 OL DIG E/M SVC 11-20 MIN: CPT | Performed by: NURSE PRACTITIONER

## 2025-01-29 RX ORDER — DOXYCYCLINE HYCLATE 100 MG
100 TABLET ORAL 2 TIMES DAILY
Qty: 20 TABLET | Refills: 0 | Status: SHIPPED | OUTPATIENT
Start: 2025-01-29 | End: 2025-02-08

## 2025-01-29 NOTE — PROGRESS NOTES
Paige Harris Lane (1960) initiated an asynchronous digital communication through [employer].    HPI: per patient questionnaire     Exam: not applicable    Diagnoses and all orders for this visit:  Diagnoses and all orders for this visit:    Acute recurrent maxillary sinusitis  -     doxycycline hyclate (VIBRA-TABS) 100 MG tablet; Take 1 tablet by mouth 2 times daily for 10 days        IF NO IMPROVEMENT WITH SYMPTOMS, THEN RECOMMEND ENT REFERRAL.      Time: EV2 - 11-20 minutes were spent on the digital evaluation and management of this patient.     Madiha T Manan, APRN - NP    [facility] Evisit Allergic Rhinitis Questionnaire 1    Question 1/29/2025 12:14 PM EST - Filed by Patient   Have your allergy symptoms improved or worsened since your last visit with your primary care provider? No change   Do you feel that your allergy symptoms are under adequate control with your current treatment plan? No   If not, list current symptoms    Are you pregnant or trying to become pregnant? No   Have you developed any new or worsening rashes? No   Have you been experiencing any new or increasing wheezing or shortness of breath? No   Have you been blowing out and/or coughing up colored mucus? No   If yes, describe    Have you been experiencing fevers? No   If yes, describe    Has your throat been sore? Yes   If yes, describe Feels raw   Have you noticed any swollen and/or tender glands in your neck? No   If yes, describe    Have you been experiencing new or worsening headaches, or tenderness in your face or teeth? No   If yes, describe    Have you had any new medical events or problems since the last time you saw your primary care provider? Yes   If yes, describe Very thick mucus going down back of my throat   Are you taking any new medications, including over the counter medications, since the last time you saw your primary care provider? No   If yes, please list    Are you currently taking any medications for allergies? No

## 2025-02-03 DIAGNOSIS — D50.9 IRON DEFICIENCY ANEMIA, UNSPECIFIED IRON DEFICIENCY ANEMIA TYPE: ICD-10-CM

## 2025-02-03 DIAGNOSIS — R79.0 LOW MAGNESIUM LEVEL: ICD-10-CM

## 2025-02-03 DIAGNOSIS — Z13.29 SCREENING FOR THYROID DISORDER: ICD-10-CM

## 2025-02-03 DIAGNOSIS — Z13.228 SCREENING FOR ENDOCRINE, METABOLIC AND IMMUNITY DISORDER: ICD-10-CM

## 2025-02-03 DIAGNOSIS — Z13.21 ENCOUNTER FOR VITAMIN DEFICIENCY SCREENING: ICD-10-CM

## 2025-02-03 DIAGNOSIS — Z13.29 SCREENING FOR ENDOCRINE, METABOLIC AND IMMUNITY DISORDER: ICD-10-CM

## 2025-02-03 DIAGNOSIS — E55.9 VITAMIN D DEFICIENCY: ICD-10-CM

## 2025-02-03 DIAGNOSIS — Z13.228 SCREENING FOR ENDOCRINE, NUTRITIONAL, METABOLIC AND IMMUNITY DISORDER: ICD-10-CM

## 2025-02-03 DIAGNOSIS — R53.83 MALAISE AND FATIGUE: ICD-10-CM

## 2025-02-03 DIAGNOSIS — R53.82 CHRONIC FATIGUE: ICD-10-CM

## 2025-02-03 DIAGNOSIS — Z13.0 SCREENING FOR ENDOCRINE, METABOLIC AND IMMUNITY DISORDER: ICD-10-CM

## 2025-02-03 DIAGNOSIS — Z13.220 SCREENING FOR LIPID DISORDERS: ICD-10-CM

## 2025-02-03 DIAGNOSIS — Z13.21 SCREENING FOR ENDOCRINE, NUTRITIONAL, METABOLIC AND IMMUNITY DISORDER: ICD-10-CM

## 2025-02-03 DIAGNOSIS — Z13.228 SCREENING FOR METABOLIC DISORDER: ICD-10-CM

## 2025-02-03 DIAGNOSIS — Z13.0 SCREENING FOR IRON DEFICIENCY ANEMIA: ICD-10-CM

## 2025-02-03 DIAGNOSIS — Z79.899 ENCOUNTER FOR LONG-TERM (CURRENT) USE OF MEDICATIONS: ICD-10-CM

## 2025-02-03 DIAGNOSIS — Z13.0 SCREENING FOR DEFICIENCY ANEMIA: ICD-10-CM

## 2025-02-03 DIAGNOSIS — Z13.29 SCREENING FOR ENDOCRINE, NUTRITIONAL, METABOLIC AND IMMUNITY DISORDER: ICD-10-CM

## 2025-02-03 DIAGNOSIS — R53.81 MALAISE AND FATIGUE: ICD-10-CM

## 2025-02-03 DIAGNOSIS — Z13.0 SCREENING FOR ENDOCRINE, NUTRITIONAL, METABOLIC AND IMMUNITY DISORDER: ICD-10-CM

## 2025-02-03 DIAGNOSIS — Z13.1 SCREENING FOR DIABETES MELLITUS: ICD-10-CM

## 2025-02-03 DIAGNOSIS — R53.83 OTHER FATIGUE: ICD-10-CM

## 2025-02-03 DIAGNOSIS — R79.9 ABNORMAL BLOOD CHEMISTRY: ICD-10-CM

## 2025-02-03 DIAGNOSIS — R53.81 MALAISE: ICD-10-CM

## 2025-02-03 DIAGNOSIS — R79.89 OTHER SPECIFIED ABNORMAL FINDINGS OF BLOOD CHEMISTRY: ICD-10-CM

## 2025-02-03 DIAGNOSIS — R73.09 OTHER ABNORMAL GLUCOSE: ICD-10-CM

## 2025-02-03 LAB
25(OH)D3 SERPL-MCNC: 38.9 NG/ML (ref 30–100)
ALBUMIN SERPL-MCNC: 2.6 G/DL (ref 3.2–4.6)
ALBUMIN/GLOB SERPL: 0.7 (ref 1–1.9)
ALP SERPL-CCNC: 300 U/L (ref 35–104)
ALT SERPL-CCNC: 22 U/L (ref 8–45)
ANION GAP SERPL CALC-SCNC: 15 MMOL/L (ref 7–16)
AST SERPL-CCNC: 23 U/L (ref 15–37)
BASOPHILS # BLD: 0.1 K/UL (ref 0–0.2)
BASOPHILS NFR BLD: 0.6 % (ref 0–2)
BILIRUB SERPL-MCNC: 0.5 MG/DL (ref 0–1.2)
BUN SERPL-MCNC: 22 MG/DL (ref 8–23)
CALCIUM SERPL-MCNC: 8.7 MG/DL (ref 8.8–10.2)
CHLORIDE SERPL-SCNC: 97 MMOL/L (ref 98–107)
CHOLEST SERPL-MCNC: 104 MG/DL (ref 0–200)
CO2 SERPL-SCNC: 27 MMOL/L (ref 20–29)
CREAT SERPL-MCNC: 0.87 MG/DL (ref 0.6–1.1)
CREAT UR-MCNC: 200 MG/DL (ref 28–217)
DIFFERENTIAL METHOD BLD: ABNORMAL
EOSINOPHIL # BLD: 0.45 K/UL (ref 0–0.8)
EOSINOPHIL NFR BLD: 2.6 % (ref 0.5–7.8)
ERYTHROCYTE [DISTWIDTH] IN BLOOD BY AUTOMATED COUNT: 15.2 % (ref 11.9–14.6)
EST. AVERAGE GLUCOSE BLD GHB EST-MCNC: 117 MG/DL
FERRITIN SERPL-MCNC: 709 NG/ML (ref 8–388)
FOLATE SERPL-MCNC: 5.9 NG/ML (ref 3.1–17.5)
GLOBULIN SER CALC-MCNC: 3.5 G/DL (ref 2.3–3.5)
GLUCOSE SERPL-MCNC: 122 MG/DL (ref 70–99)
HBA1C MFR BLD: 5.7 % (ref 0–5.6)
HCT VFR BLD AUTO: 34.9 % (ref 35.8–46.3)
HDLC SERPL-MCNC: 53 MG/DL (ref 40–60)
HDLC SERPL: 2 (ref 0–5)
HGB BLD-MCNC: 10.8 G/DL (ref 11.7–15.4)
IMM GRANULOCYTES # BLD AUTO: 0.16 K/UL (ref 0–0.5)
IMM GRANULOCYTES NFR BLD AUTO: 0.9 % (ref 0–5)
IRON SATN MFR SERPL: 15 % (ref 20–50)
IRON SERPL-MCNC: 26 UG/DL (ref 35–100)
LDLC SERPL CALC-MCNC: 31 MG/DL (ref 0–100)
LYMPHOCYTES # BLD: 1.78 K/UL (ref 0.5–4.6)
LYMPHOCYTES NFR BLD: 10.4 % (ref 13–44)
MAGNESIUM SERPL-MCNC: 1.5 MG/DL (ref 1.8–2.4)
MCH RBC QN AUTO: 30.4 PG (ref 26.1–32.9)
MCHC RBC AUTO-ENTMCNC: 30.9 G/DL (ref 31.4–35)
MCV RBC AUTO: 98.3 FL (ref 82–102)
MICROALBUMIN UR-MCNC: 5.68 MG/DL (ref 0–20)
MICROALBUMIN/CREAT UR-RTO: 28 MG/G (ref 0–30)
MONOCYTES # BLD: 1.18 K/UL (ref 0.1–1.3)
MONOCYTES NFR BLD: 6.9 % (ref 4–12)
NEUTS SEG # BLD: 13.38 K/UL (ref 1.7–8.2)
NEUTS SEG NFR BLD: 78.6 % (ref 43–78)
NRBC # BLD: 0 K/UL (ref 0–0.2)
PLATELET # BLD AUTO: 376 K/UL (ref 150–450)
PMV BLD AUTO: 10.9 FL (ref 9.4–12.3)
POTASSIUM SERPL-SCNC: 3.8 MMOL/L (ref 3.5–5.1)
PROT SERPL-MCNC: 6.1 G/DL (ref 6.3–8.2)
RBC # BLD AUTO: 3.55 M/UL (ref 4.05–5.2)
SODIUM SERPL-SCNC: 138 MMOL/L (ref 136–145)
TIBC SERPL-MCNC: 166 UG/DL (ref 240–450)
TRIGL SERPL-MCNC: 97 MG/DL (ref 0–150)
TSH W FREE THYROID IF ABNORMAL: 3.29 UIU/ML (ref 0.27–4.2)
UIBC SERPL-MCNC: 140 UG/DL (ref 112–347)
VIT B12 SERPL-MCNC: >4000 PG/ML (ref 193–986)
VLDLC SERPL CALC-MCNC: 19 MG/DL (ref 6–23)
WBC # BLD AUTO: 17.1 K/UL (ref 4.3–11.1)

## 2025-02-11 PROBLEM — E87.6 HYPOKALEMIA: Status: RESOLVED | Noted: 2020-06-28 | Resolved: 2025-02-11

## 2025-02-11 PROBLEM — D50.9 IDA (IRON DEFICIENCY ANEMIA): Status: ACTIVE | Noted: 2025-02-11

## 2025-02-11 PROBLEM — R79.0 LOW MAGNESIUM LEVEL: Status: ACTIVE | Noted: 2025-02-11

## 2025-02-11 PROBLEM — R06.02 SHORTNESS OF BREATH ON EXERTION: Status: RESOLVED | Noted: 2019-11-25 | Resolved: 2025-02-11

## 2025-02-11 PROBLEM — Z87.19 H/O SMALL BOWEL OBSTRUCTION: Status: RESOLVED | Noted: 2020-07-06 | Resolved: 2025-02-11

## 2025-04-11 DIAGNOSIS — E11.9 TYPE 2 DIABETES MELLITUS WITHOUT COMPLICATION, WITHOUT LONG-TERM CURRENT USE OF INSULIN: ICD-10-CM

## 2025-04-11 RX ORDER — PIOGLITAZONE 30 MG/1
30 TABLET ORAL DAILY
Qty: 90 TABLET | Refills: 1 | OUTPATIENT
Start: 2025-04-11